# Patient Record
Sex: MALE | Race: WHITE | NOT HISPANIC OR LATINO | Employment: PART TIME | URBAN - METROPOLITAN AREA
[De-identification: names, ages, dates, MRNs, and addresses within clinical notes are randomized per-mention and may not be internally consistent; named-entity substitution may affect disease eponyms.]

---

## 2017-04-13 ENCOUNTER — OFFICE VISIT (OUTPATIENT)
Dept: ORTHOPEDIC SURGERY | Facility: CLINIC | Age: 57
End: 2017-04-13

## 2017-04-13 VITALS — HEIGHT: 73 IN | BODY MASS INDEX: 32.34 KG/M2 | WEIGHT: 244 LBS

## 2017-04-13 DIAGNOSIS — M25.561 CHRONIC PAIN OF RIGHT KNEE: Primary | ICD-10-CM

## 2017-04-13 DIAGNOSIS — G89.29 CHRONIC PAIN OF RIGHT KNEE: Primary | ICD-10-CM

## 2017-04-13 PROCEDURE — 73562 X-RAY EXAM OF KNEE 3: CPT | Performed by: ORTHOPAEDIC SURGERY

## 2017-04-13 PROCEDURE — 20610 DRAIN/INJ JOINT/BURSA W/O US: CPT | Performed by: ORTHOPAEDIC SURGERY

## 2017-04-13 PROCEDURE — 99213 OFFICE O/P EST LOW 20 MIN: CPT | Performed by: ORTHOPAEDIC SURGERY

## 2017-04-13 RX ORDER — MELOXICAM 15 MG/1
TABLET ORAL
Qty: 30 TABLET | Refills: 3 | Status: SHIPPED | OUTPATIENT
Start: 2017-04-13 | End: 2017-07-27

## 2017-04-13 RX ADMIN — METHYLPREDNISOLONE ACETATE 80 MG: 80 INJECTION, SUSPENSION INTRA-ARTICULAR; INTRALESIONAL; INTRAMUSCULAR; SOFT TISSUE at 09:19

## 2017-04-13 RX ADMIN — BUPIVACAINE HYDROCHLORIDE 4 ML: 5 INJECTION, SOLUTION EPIDURAL; INTRACAUDAL at 09:19

## 2017-04-13 NOTE — PROGRESS NOTES
New Right Knee      Patient: Garrett Gomez        YOB: 1960    Medical Record Number: [id number]        Chief Complaints: Right knee pain  Chief Complaint   Patient presents with   • Right Knee - Establish Care           History of Present Illness: This is a  57 y.o. male who presents complaining of right knee pain.  He states he had injuries back in high school MCL injury all treated nonoperatively.  He states he noticed right knee pain ongoing about 6 weeks.  No history injury change in activity that he can recall.  He has swelling has night pain pain is medial and anterior.  His symptoms are described as moderate constant grinding aching clicking popping snapping swelling his pain with standing walking and running.  His past medical history is remarkable for thyroid disease and squamous cell carcinoma        Allergies:   Allergies   Allergen Reactions   • Meperidine Anaphylaxis       Medications:   Home Medications:  Current Outpatient Prescriptions on File Prior to Visit   Medication Sig   • levothyroxine (SYNTHROID, LEVOTHROID) 150 MCG tablet Take  by mouth.     No current facility-administered medications on file prior to visit.      Current Medications:  Scheduled Meds:  Continuous Infusions:  No current facility-administered medications for this visit.   PRN Meds:.    Past Medical History:   Diagnosis Date   • Colon polyp    • Hypertension    • Hypothyroidism         Past Surgical History:   Procedure Laterality Date   • ADENOIDECTOMY     • BACK SURGERY     • COLONOSCOPY     • EP STUDY     • EXTERNAL EAR SURGERY     • HAND SURGERY     • INGUINAL HERNIA REPAIR     • MANDIBLE SURGERY     • NOSE SURGERY     • SHOULDER SURGERY     • THYROIDECTOMY     • TONSILLECTOMY          Social History     Occupational History   • Not on file.     Social History Main Topics   • Smoking status: Former Smoker     Types: Cigars   • Smokeless tobacco: Not on file   • Alcohol use Yes      Comment: occasonial  "wine at meals   • Drug use: No   • Sexual activity: Yes     Partners: Female    Social History     Social History Narrative        Family History   Problem Relation Age of Onset   • Arthritis Mother    • No Known Problems Father    • No Known Problems Brother    • No Known Problems Daughter              Review of Systems: His 14 point review of systems are remarkable for the pertinent positives listed in the patient the remainder are negative    Review of Systems      Physical Exam: 57 y.o. male  General Appearance:    Alert, cooperative, in no acute distress                 Vitals:    04/13/17 0849   Weight: 244 lb (111 kg)   Height: 73\" (185.4 cm)      Patient is alert and read ×3 no acute distress appears her above-listed at height weight and age.  Affect is normal respiratory rate is normal unlabored. Heart rate regular rate rhythm, sclera, dentition and hearing are normal for the purpose of this exam.        Ortho Exam Physical exam of the right  knee reveals no effusion no redness.  The patient does have tenderness about the medial l joint line.  No tenderness about the lateral l joint line.  A negative bounce home and a positive l medial Marquise.    Patient has a stable ligamentous exam.  The patient has a negative Lachman and negative anterior drawer and a negative pivot shift.  Quads are reasonable and symmetric bilaterally.  Calf is soft and nontender.  There is no overlying skin changes no lymphedema lymphadenopathy.  Patient has good hip range of motion full symmetric and asymptomatic and a normal ankle exam.  She has good distal pulses and sensation distally is intact        Large Joint Arthrocentesis  Date/Time: 4/13/2017 9:19 AM  Consent given by: patient  Site marked: site marked  Timeout: Immediately prior to procedure a time out was called to verify the correct patient, procedure, equipment, support staff and site/side marked as required   Supporting Documentation  Indications: pain   Procedure " Details  Location: knee - R knee  Needle size: 25 G  Approach: anteromedial  Medications administered: 4 mL bupivacaine (PF) 0.5 %; 80 mg methylPREDNISolone acetate 80 MG/ML                     Radiology:   AP, Lateral and merchant views of the right knee  were ordered/reviewed to evauateknee pain.  I've no comparative films.  These show some mild patella femoral OA otherwise the patella sat centrally within the trochlear groove cartilage was intact on both compartments      Assessment/Plan:  Right knee pain my differential would be arthritis versus meniscal pathology.  I would like to inject him as a diagnostic and therapeutic tool I will also start him on Mobic with strict precautions and I will see him back in 4 weeks if he fails to improve we will pursue other means of testing

## 2017-04-14 RX ORDER — METHYLPREDNISOLONE ACETATE 80 MG/ML
80 INJECTION, SUSPENSION INTRA-ARTICULAR; INTRALESIONAL; INTRAMUSCULAR; SOFT TISSUE
Status: COMPLETED | OUTPATIENT
Start: 2017-04-13 | End: 2017-04-13

## 2017-04-14 RX ORDER — BUPIVACAINE HYDROCHLORIDE 5 MG/ML
4 INJECTION, SOLUTION EPIDURAL; INTRACAUDAL
Status: COMPLETED | OUTPATIENT
Start: 2017-04-13 | End: 2017-04-13

## 2017-05-18 ENCOUNTER — OFFICE VISIT (OUTPATIENT)
Dept: ORTHOPEDIC SURGERY | Facility: CLINIC | Age: 57
End: 2017-05-18

## 2017-05-18 VITALS — HEIGHT: 73 IN | WEIGHT: 244 LBS | BODY MASS INDEX: 32.34 KG/M2 | TEMPERATURE: 98.4 F

## 2017-05-18 DIAGNOSIS — G89.29 CHRONIC PAIN OF LEFT KNEE: ICD-10-CM

## 2017-05-18 DIAGNOSIS — M25.562 CHRONIC PAIN OF LEFT KNEE: ICD-10-CM

## 2017-05-18 DIAGNOSIS — S83.241D OTHER TEAR OF MEDIAL MENISCUS OF RIGHT KNEE, UNSPECIFIED WHETHER OLD OR CURRENT TEAR, SUBSEQUENT ENCOUNTER: Primary | ICD-10-CM

## 2017-05-18 PROCEDURE — 99212 OFFICE O/P EST SF 10 MIN: CPT | Performed by: ORTHOPAEDIC SURGERY

## 2017-05-18 PROCEDURE — 20610 DRAIN/INJ JOINT/BURSA W/O US: CPT | Performed by: ORTHOPAEDIC SURGERY

## 2017-05-18 RX ADMIN — BUPIVACAINE HYDROCHLORIDE 4 ML: 2.5 INJECTION, SOLUTION INFILTRATION; PERINEURAL at 08:16

## 2017-05-18 RX ADMIN — METHYLPREDNISOLONE ACETATE 80 MG: 80 INJECTION, SUSPENSION INTRA-ARTICULAR; INTRALESIONAL; INTRAMUSCULAR; SOFT TISSUE at 08:16

## 2017-05-23 RX ORDER — BUPIVACAINE HYDROCHLORIDE 2.5 MG/ML
4 INJECTION, SOLUTION INFILTRATION; PERINEURAL
Status: COMPLETED | OUTPATIENT
Start: 2017-05-18 | End: 2017-05-18

## 2017-05-23 RX ORDER — METHYLPREDNISOLONE ACETATE 80 MG/ML
80 INJECTION, SUSPENSION INTRA-ARTICULAR; INTRALESIONAL; INTRAMUSCULAR; SOFT TISSUE
Status: COMPLETED | OUTPATIENT
Start: 2017-05-18 | End: 2017-05-18

## 2017-06-13 ENCOUNTER — TELEPHONE (OUTPATIENT)
Dept: ORTHOPEDIC SURGERY | Facility: CLINIC | Age: 57
End: 2017-06-13

## 2017-06-15 NOTE — TELEPHONE ENCOUNTER
Called patient let him know his MRI results. Use Ice and rest if any more problems call our office-lck

## 2017-06-25 ENCOUNTER — APPOINTMENT (OUTPATIENT)
Dept: GENERAL RADIOLOGY | Facility: HOSPITAL | Age: 57
End: 2017-06-25

## 2017-06-25 ENCOUNTER — HOSPITAL ENCOUNTER (INPATIENT)
Facility: HOSPITAL | Age: 57
LOS: 2 days | Discharge: HOME | End: 2017-06-27
Attending: EMERGENCY MEDICINE | Admitting: INTERNAL MEDICINE

## 2017-06-25 ENCOUNTER — APPOINTMENT (OUTPATIENT)
Dept: CT IMAGING | Facility: HOSPITAL | Age: 57
End: 2017-06-25

## 2017-06-25 DIAGNOSIS — I95.1 ORTHOSTATIC HYPOTENSION: ICD-10-CM

## 2017-06-25 DIAGNOSIS — I95.1 ORTHOSTATIC SYNCOPE: ICD-10-CM

## 2017-06-25 DIAGNOSIS — K92.2 ACUTE GI BLEEDING: Primary | ICD-10-CM

## 2017-06-25 LAB
ABO GROUP BLD: NORMAL
ALBUMIN SERPL-MCNC: 3.2 G/DL (ref 3.5–5.2)
ALBUMIN/GLOB SERPL: 1.5 G/DL
ALP SERPL-CCNC: 54 U/L (ref 39–117)
ALT SERPL W P-5'-P-CCNC: 21 U/L (ref 1–41)
ANION GAP SERPL CALCULATED.3IONS-SCNC: 11.1 MMOL/L
APTT PPP: 26.3 SECONDS (ref 22.7–35.4)
AST SERPL-CCNC: 16 U/L (ref 1–40)
BASOPHILS # BLD AUTO: 0.03 10*3/MM3 (ref 0–0.2)
BASOPHILS NFR BLD AUTO: 0.4 % (ref 0–1.5)
BILIRUB SERPL-MCNC: 0.3 MG/DL (ref 0.1–1.2)
BILIRUB UR QL STRIP: NEGATIVE
BLD GP AB SCN SERPL QL: NEGATIVE
BUN BLD-MCNC: 14 MG/DL (ref 6–20)
BUN/CREAT SERPL: 12.6 (ref 7–25)
CALCIUM SPEC-SCNC: 7.8 MG/DL (ref 8.6–10.5)
CHLORIDE SERPL-SCNC: 106 MMOL/L (ref 98–107)
CLARITY UR: CLEAR
CO2 SERPL-SCNC: 23.9 MMOL/L (ref 22–29)
COLOR UR: YELLOW
CREAT BLD-MCNC: 1.11 MG/DL (ref 0.76–1.27)
D-LACTATE SERPL-SCNC: 1.5 MMOL/L (ref 0.5–2)
D-LACTATE SERPL-SCNC: 2.2 MMOL/L (ref 0.5–2)
DEPRECATED RDW RBC AUTO: 44.7 FL (ref 37–54)
EOSINOPHIL # BLD AUTO: 0.08 10*3/MM3 (ref 0–0.7)
EOSINOPHIL NFR BLD AUTO: 1.1 % (ref 0.3–6.2)
ERYTHROCYTE [DISTWIDTH] IN BLOOD BY AUTOMATED COUNT: 13.6 % (ref 11.5–14.5)
GFR SERPL CREATININE-BSD FRML MDRD: 69 ML/MIN/1.73
GLOBULIN UR ELPH-MCNC: 2.2 GM/DL
GLUCOSE BLD-MCNC: 158 MG/DL (ref 65–99)
GLUCOSE BLDC GLUCOMTR-MCNC: 128 MG/DL (ref 70–130)
GLUCOSE BLDC GLUCOMTR-MCNC: 155 MG/DL (ref 70–130)
GLUCOSE BLDC GLUCOMTR-MCNC: 99 MG/DL (ref 70–130)
GLUCOSE UR STRIP-MCNC: NEGATIVE MG/DL
HCT VFR BLD AUTO: 29.1 % (ref 40.4–52.2)
HCT VFR BLD AUTO: 31.6 % (ref 40.4–52.2)
HCT VFR BLD AUTO: 38.3 % (ref 40.4–52.2)
HGB BLD-MCNC: 10.9 G/DL (ref 13.7–17.6)
HGB BLD-MCNC: 13.2 G/DL (ref 13.7–17.6)
HGB BLD-MCNC: 9.7 G/DL (ref 13.7–17.6)
HGB UR QL STRIP.AUTO: NEGATIVE
HOLD SPECIMEN: NORMAL
HOLD SPECIMEN: NORMAL
IMM GRANULOCYTES # BLD: 0.11 10*3/MM3 (ref 0–0.03)
IMM GRANULOCYTES NFR BLD: 1.5 % (ref 0–0.5)
INR PPP: 1.14 (ref 0.9–1.1)
KETONES UR QL STRIP: NEGATIVE
LEUKOCYTE ESTERASE UR QL STRIP.AUTO: NEGATIVE
LIPASE SERPL-CCNC: 47 U/L (ref 13–60)
LYMPHOCYTES # BLD AUTO: 2.37 10*3/MM3 (ref 0.9–4.8)
LYMPHOCYTES NFR BLD AUTO: 31.8 % (ref 19.6–45.3)
MCH RBC QN AUTO: 30.9 PG (ref 27–32.7)
MCHC RBC AUTO-ENTMCNC: 34.5 G/DL (ref 32.6–36.4)
MCV RBC AUTO: 89.7 FL (ref 79.8–96.2)
MONOCYTES # BLD AUTO: 0.7 10*3/MM3 (ref 0.2–1.2)
MONOCYTES NFR BLD AUTO: 9.4 % (ref 5–12)
NEUTROPHILS # BLD AUTO: 4.17 10*3/MM3 (ref 1.9–8.1)
NEUTROPHILS NFR BLD AUTO: 55.8 % (ref 42.7–76)
NITRITE UR QL STRIP: NEGATIVE
PH UR STRIP.AUTO: 6 [PH] (ref 5–8)
PLATELET # BLD AUTO: 230 10*3/MM3 (ref 140–500)
PMV BLD AUTO: 9.1 FL (ref 6–12)
POTASSIUM BLD-SCNC: 3.9 MMOL/L (ref 3.5–5.2)
PROT SERPL-MCNC: 5.4 G/DL (ref 6–8.5)
PROT UR QL STRIP: NEGATIVE
PROTHROMBIN TIME: 14.2 SECONDS (ref 11.7–14.2)
RBC # BLD AUTO: 4.27 10*6/MM3 (ref 4.6–6)
RH BLD: POSITIVE
SODIUM BLD-SCNC: 141 MMOL/L (ref 136–145)
SP GR UR STRIP: 1.02 (ref 1–1.03)
UROBILINOGEN UR QL STRIP: NORMAL
WBC NRBC COR # BLD: 7.46 10*3/MM3 (ref 4.5–10.7)
WHOLE BLOOD HOLD SPECIMEN: NORMAL
WHOLE BLOOD HOLD SPECIMEN: NORMAL

## 2017-06-25 PROCEDURE — 83605 ASSAY OF LACTIC ACID: CPT

## 2017-06-25 PROCEDURE — 83605 ASSAY OF LACTIC ACID: CPT | Performed by: EMERGENCY MEDICINE

## 2017-06-25 PROCEDURE — 25010000002 FENTANYL CITRATE (PF) 100 MCG/2ML SOLUTION: Performed by: INTERNAL MEDICINE

## 2017-06-25 PROCEDURE — 81003 URINALYSIS AUTO W/O SCOPE: CPT | Performed by: INTERNAL MEDICINE

## 2017-06-25 PROCEDURE — 85730 THROMBOPLASTIN TIME PARTIAL: CPT | Performed by: EMERGENCY MEDICINE

## 2017-06-25 PROCEDURE — 85610 PROTHROMBIN TIME: CPT | Performed by: EMERGENCY MEDICINE

## 2017-06-25 PROCEDURE — 99254 IP/OBS CNSLTJ NEW/EST MOD 60: CPT | Performed by: INTERNAL MEDICINE

## 2017-06-25 PROCEDURE — 86850 RBC ANTIBODY SCREEN: CPT

## 2017-06-25 PROCEDURE — 99291 CRITICAL CARE FIRST HOUR: CPT

## 2017-06-25 PROCEDURE — 86901 BLOOD TYPING SEROLOGIC RH(D): CPT

## 2017-06-25 PROCEDURE — 85025 COMPLETE CBC W/AUTO DIFF WBC: CPT

## 2017-06-25 PROCEDURE — 0 IOPAMIDOL 61 % SOLUTION: Performed by: EMERGENCY MEDICINE

## 2017-06-25 PROCEDURE — 83690 ASSAY OF LIPASE: CPT | Performed by: EMERGENCY MEDICINE

## 2017-06-25 PROCEDURE — 86900 BLOOD TYPING SEROLOGIC ABO: CPT

## 2017-06-25 PROCEDURE — 74177 CT ABD & PELVIS W/CONTRAST: CPT

## 2017-06-25 PROCEDURE — 80053 COMPREHEN METABOLIC PANEL: CPT

## 2017-06-25 PROCEDURE — 71010 HC CHEST PA OR AP: CPT

## 2017-06-25 PROCEDURE — 85018 HEMOGLOBIN: CPT | Performed by: INTERNAL MEDICINE

## 2017-06-25 PROCEDURE — 85014 HEMATOCRIT: CPT | Performed by: INTERNAL MEDICINE

## 2017-06-25 PROCEDURE — 25010000002 ONDANSETRON PER 1 MG: Performed by: EMERGENCY MEDICINE

## 2017-06-25 PROCEDURE — 25010000002 MORPHINE PER 10 MG: Performed by: EMERGENCY MEDICINE

## 2017-06-25 PROCEDURE — 82962 GLUCOSE BLOOD TEST: CPT

## 2017-06-25 RX ORDER — MORPHINE SULFATE 2 MG/ML
2 INJECTION, SOLUTION INTRAMUSCULAR; INTRAVENOUS ONCE
Status: COMPLETED | OUTPATIENT
Start: 2017-06-25 | End: 2017-06-25

## 2017-06-25 RX ORDER — LEVOTHYROXINE SODIUM 0.15 MG/1
150 TABLET ORAL
Status: DISCONTINUED | OUTPATIENT
Start: 2017-06-26 | End: 2017-06-25

## 2017-06-25 RX ORDER — TESTOSTERONE CYPIONATE 200 MG/ML
0.5 INJECTION, SOLUTION INTRAMUSCULAR 2 TIMES WEEKLY
Status: DISCONTINUED | OUTPATIENT
Start: 2017-06-26 | End: 2017-06-27 | Stop reason: HOSPADM

## 2017-06-25 RX ORDER — SODIUM CHLORIDE 0.9 % (FLUSH) 0.9 %
10 SYRINGE (ML) INJECTION AS NEEDED
Status: DISCONTINUED | OUTPATIENT
Start: 2017-06-25 | End: 2017-06-27 | Stop reason: HOSPADM

## 2017-06-25 RX ORDER — MELATONIN
10000 DAILY
Status: DISCONTINUED | OUTPATIENT
Start: 2017-06-25 | End: 2017-06-25

## 2017-06-25 RX ORDER — ONDANSETRON 2 MG/ML
4 INJECTION INTRAMUSCULAR; INTRAVENOUS ONCE
Status: COMPLETED | OUTPATIENT
Start: 2017-06-25 | End: 2017-06-25

## 2017-06-25 RX ORDER — DIPHENOXYLATE HYDROCHLORIDE AND ATROPINE SULFATE 2.5; .025 MG/1; MG/1
1 TABLET ORAL DAILY
Status: DISCONTINUED | OUTPATIENT
Start: 2017-06-25 | End: 2017-06-25

## 2017-06-25 RX ORDER — MELATONIN
10000 DAILY
Status: DISCONTINUED | OUTPATIENT
Start: 2017-06-26 | End: 2017-06-27 | Stop reason: HOSPADM

## 2017-06-25 RX ORDER — FENTANYL CITRATE 50 UG/ML
25 INJECTION, SOLUTION INTRAMUSCULAR; INTRAVENOUS
Status: DISCONTINUED | OUTPATIENT
Start: 2017-06-25 | End: 2017-06-25

## 2017-06-25 RX ORDER — PANTOPRAZOLE SODIUM 40 MG/10ML
80 INJECTION, POWDER, LYOPHILIZED, FOR SOLUTION INTRAVENOUS ONCE
Status: COMPLETED | OUTPATIENT
Start: 2017-06-25 | End: 2017-06-25

## 2017-06-25 RX ORDER — DIPHENOXYLATE HYDROCHLORIDE AND ATROPINE SULFATE 2.5; .025 MG/1; MG/1
1 TABLET ORAL DAILY
Status: DISCONTINUED | OUTPATIENT
Start: 2017-06-26 | End: 2017-06-27 | Stop reason: HOSPADM

## 2017-06-25 RX ORDER — FENTANYL CITRATE 50 UG/ML
50 INJECTION, SOLUTION INTRAMUSCULAR; INTRAVENOUS
Status: DISCONTINUED | OUTPATIENT
Start: 2017-06-25 | End: 2017-06-27 | Stop reason: HOSPADM

## 2017-06-25 RX ORDER — PANTOPRAZOLE SODIUM 40 MG/1
40 TABLET, DELAYED RELEASE ORAL EVERY MORNING
Status: DISCONTINUED | OUTPATIENT
Start: 2017-06-26 | End: 2017-06-27 | Stop reason: HOSPADM

## 2017-06-25 RX ORDER — SODIUM CHLORIDE 9 MG/ML
175 INJECTION, SOLUTION INTRAVENOUS CONTINUOUS
Status: DISCONTINUED | OUTPATIENT
Start: 2017-06-25 | End: 2017-06-27 | Stop reason: HOSPADM

## 2017-06-25 RX ADMIN — PANTOPRAZOLE SODIUM 8 MG/HR: 40 INJECTION, POWDER, FOR SOLUTION INTRAVENOUS at 09:28

## 2017-06-25 RX ADMIN — FENTANYL CITRATE 25 MCG: 50 INJECTION INTRAMUSCULAR; INTRAVENOUS at 14:06

## 2017-06-25 RX ADMIN — PANTOPRAZOLE SODIUM 80 MG: 40 INJECTION, POWDER, FOR SOLUTION INTRAVENOUS at 09:28

## 2017-06-25 RX ADMIN — METRONIDAZOLE 500 MG: 500 INJECTION, SOLUTION INTRAVENOUS at 14:09

## 2017-06-25 RX ADMIN — SODIUM CHLORIDE 1000 ML: 9 INJECTION, SOLUTION INTRAVENOUS at 11:10

## 2017-06-25 RX ADMIN — MORPHINE SULFATE 2 MG: 2 INJECTION, SOLUTION INTRAMUSCULAR; INTRAVENOUS at 09:20

## 2017-06-25 RX ADMIN — SODIUM CHLORIDE 175 ML/HR: 9 INJECTION, SOLUTION INTRAVENOUS at 16:00

## 2017-06-25 RX ADMIN — SODIUM CHLORIDE 175 ML/HR: 9 INJECTION, SOLUTION INTRAVENOUS at 23:13

## 2017-06-25 RX ADMIN — IOPAMIDOL 85 ML: 612 INJECTION, SOLUTION INTRAVENOUS at 09:46

## 2017-06-25 RX ADMIN — SODIUM CHLORIDE 250 ML/HR: 9 INJECTION, SOLUTION INTRAVENOUS at 10:54

## 2017-06-25 RX ADMIN — FENTANYL CITRATE 25 MCG: 50 INJECTION INTRAMUSCULAR; INTRAVENOUS at 12:14

## 2017-06-25 RX ADMIN — FENTANYL CITRATE 50 MCG: 50 INJECTION INTRAMUSCULAR; INTRAVENOUS at 14:31

## 2017-06-25 RX ADMIN — ONDANSETRON 4 MG: 2 INJECTION INTRAMUSCULAR; INTRAVENOUS at 09:23

## 2017-06-25 RX ADMIN — SODIUM CHLORIDE 1000 ML: 9 INJECTION, SOLUTION INTRAVENOUS at 10:53

## 2017-06-26 ENCOUNTER — ANESTHESIA EVENT (OUTPATIENT)
Dept: GASTROENTEROLOGY | Facility: HOSPITAL | Age: 57
End: 2017-06-26

## 2017-06-26 ENCOUNTER — ANESTHESIA (OUTPATIENT)
Dept: GASTROENTEROLOGY | Facility: HOSPITAL | Age: 57
End: 2017-06-26

## 2017-06-26 LAB
ANION GAP SERPL CALCULATED.3IONS-SCNC: 10.7 MMOL/L
BUN BLD-MCNC: 7 MG/DL (ref 6–20)
BUN/CREAT SERPL: 9.2 (ref 7–25)
CALCIUM SPEC-SCNC: 7.5 MG/DL (ref 8.6–10.5)
CHLORIDE SERPL-SCNC: 111 MMOL/L (ref 98–107)
CO2 SERPL-SCNC: 20.3 MMOL/L (ref 22–29)
CREAT BLD-MCNC: 0.76 MG/DL (ref 0.76–1.27)
GFR SERPL CREATININE-BSD FRML MDRD: 106 ML/MIN/1.73
GLUCOSE BLD-MCNC: 84 MG/DL (ref 65–99)
GLUCOSE BLDC GLUCOMTR-MCNC: 92 MG/DL (ref 70–130)
HCT VFR BLD AUTO: 31.3 % (ref 40.4–52.2)
HCT VFR BLD AUTO: 32.1 % (ref 40.4–52.2)
HCT VFR BLD AUTO: 34.5 % (ref 40.4–52.2)
HGB BLD-MCNC: 10.3 G/DL (ref 13.7–17.6)
HGB BLD-MCNC: 10.6 G/DL (ref 13.7–17.6)
HGB BLD-MCNC: 11.6 G/DL (ref 13.7–17.6)
MAGNESIUM SERPL-MCNC: 2 MG/DL (ref 1.6–2.6)
POTASSIUM BLD-SCNC: 3.7 MMOL/L (ref 3.5–5.2)
SODIUM BLD-SCNC: 142 MMOL/L (ref 136–145)

## 2017-06-26 PROCEDURE — 85014 HEMATOCRIT: CPT | Performed by: INTERNAL MEDICINE

## 2017-06-26 PROCEDURE — 83735 ASSAY OF MAGNESIUM: CPT | Performed by: INTERNAL MEDICINE

## 2017-06-26 PROCEDURE — 45378 DIAGNOSTIC COLONOSCOPY: CPT | Performed by: INTERNAL MEDICINE

## 2017-06-26 PROCEDURE — 25010000002 TESTOSTERONE CYPIONATE 200 MG/ML SOLUTION: Performed by: INTERNAL MEDICINE

## 2017-06-26 PROCEDURE — 85018 HEMOGLOBIN: CPT | Performed by: INTERNAL MEDICINE

## 2017-06-26 PROCEDURE — 82962 GLUCOSE BLOOD TEST: CPT

## 2017-06-26 PROCEDURE — 0DJD8ZZ INSPECTION OF LOWER INTESTINAL TRACT, VIA NATURAL OR ARTIFICIAL OPENING ENDOSCOPIC: ICD-10-PCS | Performed by: INTERNAL MEDICINE

## 2017-06-26 PROCEDURE — 25010000002 PROPOFOL 10 MG/ML EMULSION: Performed by: ANESTHESIOLOGY

## 2017-06-26 PROCEDURE — 80048 BASIC METABOLIC PNL TOTAL CA: CPT | Performed by: INTERNAL MEDICINE

## 2017-06-26 RX ORDER — PROPOFOL 10 MG/ML
VIAL (ML) INTRAVENOUS AS NEEDED
Status: DISCONTINUED | OUTPATIENT
Start: 2017-06-26 | End: 2017-06-26 | Stop reason: SURG

## 2017-06-26 RX ORDER — LIDOCAINE HYDROCHLORIDE 20 MG/ML
INJECTION, SOLUTION INFILTRATION; PERINEURAL AS NEEDED
Status: DISCONTINUED | OUTPATIENT
Start: 2017-06-26 | End: 2017-06-26 | Stop reason: SURG

## 2017-06-26 RX ORDER — PROPOFOL 10 MG/ML
VIAL (ML) INTRAVENOUS CONTINUOUS PRN
Status: DISCONTINUED | OUTPATIENT
Start: 2017-06-26 | End: 2017-06-26 | Stop reason: SURG

## 2017-06-26 RX ORDER — POLYETHYLENE GLYCOL 3350, SODIUM CHLORIDE, POTASSIUM CHLORIDE, SODIUM BICARBONATE, AND SODIUM SULFATE 240; 5.84; 2.98; 6.72; 22.72 G/4L; G/4L; G/4L; G/4L; G/4L
2000 POWDER, FOR SOLUTION ORAL ONCE
Status: COMPLETED | OUTPATIENT
Start: 2017-06-26 | End: 2017-06-26

## 2017-06-26 RX ORDER — BISACODYL 5 MG/1
10 TABLET, DELAYED RELEASE ORAL ONCE
Status: COMPLETED | OUTPATIENT
Start: 2017-06-26 | End: 2017-06-26

## 2017-06-26 RX ADMIN — SODIUM CHLORIDE 175 ML/HR: 9 INJECTION, SOLUTION INTRAVENOUS at 16:55

## 2017-06-26 RX ADMIN — SODIUM CHLORIDE 175 ML/HR: 9 INJECTION, SOLUTION INTRAVENOUS at 23:47

## 2017-06-26 RX ADMIN — PROPOFOL 160 MCG/KG/MIN: 10 INJECTION, EMULSION INTRAVENOUS at 16:07

## 2017-06-26 RX ADMIN — METRONIDAZOLE 500 MG: 500 INJECTION, SOLUTION INTRAVENOUS at 18:27

## 2017-06-26 RX ADMIN — Medication 1 TABLET: at 09:15

## 2017-06-26 RX ADMIN — POLYETHYLENE GLYCOL 3350, SODIUM CHLORIDE, POTASSIUM CHLORIDE, SODIUM BICARBONATE, AND SODIUM SULFATE 2000 ML: 240; 5.84; 2.98; 6.72; 22.72 POWDER, FOR SOLUTION ORAL at 06:05

## 2017-06-26 RX ADMIN — BISACODYL 10 MG: 5 TABLET, COATED ORAL at 05:25

## 2017-06-26 RX ADMIN — PANTOPRAZOLE SODIUM 40 MG: 40 TABLET, DELAYED RELEASE ORAL at 09:14

## 2017-06-26 RX ADMIN — SODIUM CHLORIDE 175 ML/HR: 9 INJECTION, SOLUTION INTRAVENOUS at 11:22

## 2017-06-26 RX ADMIN — PROPOFOL 120 MG: 10 INJECTION, EMULSION INTRAVENOUS at 16:07

## 2017-06-26 RX ADMIN — TESTOSTERONE CYPIONATE 100 MG: 200 INJECTION, SOLUTION INTRAMUSCULAR at 09:18

## 2017-06-26 RX ADMIN — LIDOCAINE HYDROCHLORIDE 100 MG: 20 INJECTION, SOLUTION INFILTRATION; PERINEURAL at 16:07

## 2017-06-26 NOTE — ANESTHESIA POSTPROCEDURE EVALUATION
Patient: Pablo Gomez Jr.    Procedure Summary     Date Anesthesia Start Anesthesia Stop Room / Location    06/26/17 [id number]  [facility]       Procedure Diagnosis Surgeon Provider    COLONOSCOPY (N/A ) Acute GI bleeding  (Acute GI bleeding [K92.2]) MD Heladio Burr MD          Anesthesia Type: MAC  Last vitals  BP      Temp      Pulse     Resp      SpO2        Post Anesthesia Care and Evaluation    Patient location during evaluation: ICU  Patient participation: complete - patient participated  Level of consciousness: awake and alert  Pain score: 0  Pain management: adequate  Airway patency: patent  Anesthetic complications: No anesthetic complications    Cardiovascular status: acceptable  Respiratory status: acceptable  Hydration status: acceptable

## 2017-06-26 NOTE — ANESTHESIA PREPROCEDURE EVALUATION
Anesthesia Evaluation     Patient summary reviewed and Nursing notes reviewed   no history of anesthetic complications:  NPO Solid Status: > 8 hours  NPO Liquid Status: > 8 hours     Airway   Mallampati: II  TM distance: >3 FB  Neck ROM: full  no difficulty expected  Dental - normal exam     Pulmonary - normal exam   (+) a smoker Former,   Cardiovascular - normal exam  Exercise tolerance: good (4-7 METS)    ECG reviewed  Rhythm: regular  Rate: normal    (+) hypertension well controlled, dysrhythmias Atrial Fib,     ROS comment: A.fib s/p ablation.    Neuro/Psych- negative ROS  GI/Hepatic/Renal/Endo    (+)  hypothyroidism,     ROS Comment: Acute GI bleed w/ anemia    Musculoskeletal (-) negative ROS    Abdominal  - normal exam   Substance History - negative use     OB/GYN negative ob/gyn ROS         Other - negative ROS                                       Anesthesia Plan    ASA 3     MAC     intravenous induction   Anesthetic plan and risks discussed with patient.    Plan discussed with attending.

## 2017-06-27 VITALS
TEMPERATURE: 98.5 F | WEIGHT: 224 LBS | DIASTOLIC BLOOD PRESSURE: 95 MMHG | SYSTOLIC BLOOD PRESSURE: 156 MMHG | RESPIRATION RATE: 20 BRPM | HEIGHT: 76 IN | HEART RATE: 77 BPM | OXYGEN SATURATION: 99 % | BODY MASS INDEX: 27.28 KG/M2

## 2017-06-27 PROBLEM — K92.2 ACUTE GI BLEEDING: Status: RESOLVED | Noted: 2017-06-25 | Resolved: 2017-06-27

## 2017-06-27 LAB
GLUCOSE BLDC GLUCOMTR-MCNC: 92 MG/DL (ref 70–130)
HCT VFR BLD AUTO: 29.3 % (ref 40.4–52.2)
HGB BLD-MCNC: 9.8 G/DL (ref 13.7–17.6)

## 2017-06-27 PROCEDURE — 85014 HEMATOCRIT: CPT | Performed by: INTERNAL MEDICINE

## 2017-06-27 PROCEDURE — 82962 GLUCOSE BLOOD TEST: CPT

## 2017-06-27 PROCEDURE — 85018 HEMOGLOBIN: CPT | Performed by: INTERNAL MEDICINE

## 2017-06-27 RX ORDER — METRONIDAZOLE 500 MG/1
500 TABLET ORAL 3 TIMES DAILY
Qty: 30 TABLET | Refills: 0 | Status: SHIPPED | OUTPATIENT
Start: 2017-06-27 | End: 2017-07-27

## 2017-06-27 RX ADMIN — Medication 1 TABLET: at 08:01

## 2017-06-27 RX ADMIN — PANTOPRAZOLE SODIUM 40 MG: 40 TABLET, DELAYED RELEASE ORAL at 06:50

## 2017-06-27 RX ADMIN — SODIUM CHLORIDE 175 ML/HR: 9 INJECTION, SOLUTION INTRAVENOUS at 06:50

## 2017-06-27 RX ADMIN — METRONIDAZOLE 500 MG: 500 INJECTION, SOLUTION INTRAVENOUS at 01:23

## 2017-07-27 ENCOUNTER — OFFICE VISIT (OUTPATIENT)
Dept: FAMILY MEDICINE CLINIC | Facility: CLINIC | Age: 57
End: 2017-07-27

## 2017-07-27 VITALS
RESPIRATION RATE: 18 BRPM | TEMPERATURE: 98.1 F | OXYGEN SATURATION: 99 % | SYSTOLIC BLOOD PRESSURE: 120 MMHG | WEIGHT: 228.8 LBS | BODY MASS INDEX: 27.86 KG/M2 | DIASTOLIC BLOOD PRESSURE: 72 MMHG | HEIGHT: 76 IN | HEART RATE: 73 BPM

## 2017-07-27 DIAGNOSIS — D50.8 OTHER IRON DEFICIENCY ANEMIA: ICD-10-CM

## 2017-07-27 DIAGNOSIS — E03.9 ADULT HYPOTHYROIDISM: ICD-10-CM

## 2017-07-27 DIAGNOSIS — I10 ESSENTIAL HYPERTENSION: Primary | ICD-10-CM

## 2017-07-27 LAB
ALBUMIN SERPL-MCNC: 4.2 G/DL (ref 3.5–5.2)
ALBUMIN/GLOB SERPL: 1.9 G/DL
ALP SERPL-CCNC: 73 U/L (ref 39–117)
ALT SERPL-CCNC: 21 U/L (ref 1–41)
AST SERPL-CCNC: 16 U/L (ref 1–40)
BASOPHILS # BLD AUTO: 0.02 10*3/MM3 (ref 0–0.2)
BASOPHILS NFR BLD AUTO: 0.4 % (ref 0–1.5)
BILIRUB SERPL-MCNC: 0.4 MG/DL (ref 0.1–1.2)
BUN SERPL-MCNC: 13 MG/DL (ref 6–20)
BUN/CREAT SERPL: 14.3 (ref 7–25)
CALCIUM SERPL-MCNC: 9.2 MG/DL (ref 8.6–10.5)
CHLORIDE SERPL-SCNC: 104 MMOL/L (ref 98–107)
CO2 SERPL-SCNC: 24.8 MMOL/L (ref 22–29)
CREAT SERPL-MCNC: 0.91 MG/DL (ref 0.76–1.27)
EOSINOPHIL # BLD AUTO: 0.13 10*3/MM3 (ref 0–0.7)
EOSINOPHIL NFR BLD AUTO: 2.4 % (ref 0.3–6.2)
ERYTHROCYTE [DISTWIDTH] IN BLOOD BY AUTOMATED COUNT: 13.7 % (ref 11.5–14.5)
GLOBULIN SER CALC-MCNC: 2.2 GM/DL
GLUCOSE SERPL-MCNC: 82 MG/DL (ref 65–99)
HCT VFR BLD AUTO: 34.6 % (ref 40.4–52.2)
HGB BLD-MCNC: 10.3 G/DL (ref 13.7–17.6)
IMM GRANULOCYTES # BLD: 0 10*3/MM3 (ref 0–0.03)
IMM GRANULOCYTES NFR BLD: 0 % (ref 0–0.5)
LYMPHOCYTES # BLD AUTO: 1.91 10*3/MM3 (ref 0.9–4.8)
LYMPHOCYTES NFR BLD AUTO: 35.2 % (ref 19.6–45.3)
MCH RBC QN AUTO: 27.2 PG (ref 27–32.7)
MCHC RBC AUTO-ENTMCNC: 29.8 G/DL (ref 32.6–36.4)
MCV RBC AUTO: 91.3 FL (ref 79.8–96.2)
MONOCYTES # BLD AUTO: 0.55 10*3/MM3 (ref 0.2–1.2)
MONOCYTES NFR BLD AUTO: 10.1 % (ref 5–12)
NEUTROPHILS # BLD AUTO: 2.82 10*3/MM3 (ref 1.9–8.1)
NEUTROPHILS NFR BLD AUTO: 51.9 % (ref 42.7–76)
PLATELET # BLD AUTO: 323 10*3/MM3 (ref 140–500)
POTASSIUM SERPL-SCNC: 4.3 MMOL/L (ref 3.5–5.2)
PROT SERPL-MCNC: 6.4 G/DL (ref 6–8.5)
RBC # BLD AUTO: 3.79 10*6/MM3 (ref 4.6–6)
SODIUM SERPL-SCNC: 141 MMOL/L (ref 136–145)
TSH SERPL DL<=0.005 MIU/L-ACNC: 3.91 MIU/ML (ref 0.27–4.2)
WBC # BLD AUTO: 5.43 10*3/MM3 (ref 4.5–10.7)

## 2017-07-27 PROCEDURE — 99213 OFFICE O/P EST LOW 20 MIN: CPT | Performed by: FAMILY MEDICINE

## 2017-07-27 NOTE — PROGRESS NOTES
Subjective   Pablo Gomez Jr. is a 57 y.o. male. CC: anemia    History of Present Illness   Pablo is a 57 year old male who comes in today for check on anemia.  He was hospitalized on 6/25 after developing an acute GI bleed.  He states that it was determined that this was secondary to diverticular disease.  He was given 15 bags of IVFs.  Did not require blood transfusion.    In review of the records, Hgb at the time of discharge was 9.8.  He states that he is feeling much better now with no rectal bleeding.  Bowel movements are normal.  He is due to have TSH drawn.  Has had thyroidectomy.  His blood pressure has been under good control.  He has applied to be a  in [facility] and needs forms signed concerning his past medical history.  Also needs form filled out with his vitals and a physical.          The following portions of the patient's history were reviewed and updated as appropriate: allergies, current medications, past medical history, past social history, past surgical history and problem list.    Review of Systems   Constitutional: Negative.  Negative for fatigue and unexpected weight change.   HENT: Negative.  Negative for congestion and sinus pressure.    Respiratory: Negative.  Negative for cough, shortness of breath and wheezing.    Cardiovascular: Negative.  Negative for chest pain, palpitations and leg swelling.   Gastrointestinal: Negative.  Negative for abdominal pain, blood in stool, constipation, diarrhea, nausea and vomiting.   Genitourinary: Negative.  Negative for difficulty urinating.   Musculoskeletal: Negative.  Negative for arthralgias and back pain.   Skin: Negative.  Negative for rash.   Neurological: Negative.  Negative for dizziness, light-headedness and headaches.   Psychiatric/Behavioral: Negative.  Negative for dysphoric mood and sleep disturbance. The patient is not nervous/anxious.        Objective   Physical Exam   Constitutional: He is oriented to person,  place, and time. He appears well-developed and well-nourished.   Neck: Normal range of motion. Neck supple. No JVD present. Carotid bruit is not present. No thyromegaly present.   Cardiovascular: Normal rate, regular rhythm and normal heart sounds.    Pulmonary/Chest: Effort normal and breath sounds normal. No respiratory distress.   Lymphadenopathy:     He has no cervical adenopathy.   Neurological: He is alert and oriented to person, place, and time.   Skin: Skin is warm and dry. No rash noted.   Psychiatric: He has a normal mood and affect. His behavior is normal.   Nursing note and vitals reviewed.    Vitals:    07/27/17 0804   BP: 120/72   Pulse: 73   Resp: 18   Temp: 98.1 °F (36.7 °C)   SpO2: 99%     Assessment/Plan   Problems Addressed this Visit        Cardiovascular and Mediastinum    Hypertension - Primary    Relevant Orders    TSH       Endocrine    Adult hypothyroidism    Relevant Orders    Comprehensive Metabolic Panel      Other Visit Diagnoses     Other iron deficiency anemia        Relevant Orders    CBC & Differential      Blood pressure is well controlled so nothing needs to be changed.  Checking CBC to be sure anemia is stable or has resolved.  Diagnosis with the orders above are in error.  The TSH is done for hypothyroidism and the CMP for the hypertension.  Forms filled out for the 's office.  Copy of these scanned into the chart.

## 2017-07-28 ENCOUNTER — TELEPHONE (OUTPATIENT)
Dept: FAMILY MEDICINE CLINIC | Facility: CLINIC | Age: 57
End: 2017-07-28

## 2017-07-28 NOTE — TELEPHONE ENCOUNTER
----- Message from Robyn Green MA sent at 7/28/2017 11:53 AM EDT -----  Pt notified and wondering does he need to take meds or come back and check it? He said he knows with loosing a lot of blood this is the reason or just give it time?    Should build back on its own.  Would recommend repeat CBC in about 4-6 weeks to be sure.  Lab only is fine and order will be in EMR

## 2017-09-05 DIAGNOSIS — D50.8 OTHER IRON DEFICIENCY ANEMIA: Primary | ICD-10-CM

## 2017-09-05 LAB
BASOPHILS # BLD AUTO: 0.03 10*3/MM3 (ref 0–0.2)
BASOPHILS NFR BLD AUTO: 0.4 % (ref 0–1.5)
EOSINOPHIL # BLD AUTO: 0.07 10*3/MM3 (ref 0–0.7)
EOSINOPHIL NFR BLD AUTO: 0.9 % (ref 0.3–6.2)
ERYTHROCYTE [DISTWIDTH] IN BLOOD BY AUTOMATED COUNT: 15.4 % (ref 11.5–14.5)
HCT VFR BLD AUTO: 36.8 % (ref 40.4–52.2)
HGB BLD-MCNC: 10.7 G/DL (ref 13.7–17.6)
IMM GRANULOCYTES # BLD: 0.02 10*3/MM3 (ref 0–0.03)
IMM GRANULOCYTES NFR BLD: 0.3 % (ref 0–0.5)
LYMPHOCYTES # BLD AUTO: 2.15 10*3/MM3 (ref 0.9–4.8)
LYMPHOCYTES NFR BLD AUTO: 28.9 % (ref 19.6–45.3)
MCH RBC QN AUTO: 23.6 PG (ref 27–32.7)
MCHC RBC AUTO-ENTMCNC: 29.1 G/DL (ref 32.6–36.4)
MCV RBC AUTO: 81.1 FL (ref 79.8–96.2)
MONOCYTES # BLD AUTO: 0.68 10*3/MM3 (ref 0.2–1.2)
MONOCYTES NFR BLD AUTO: 9.1 % (ref 5–12)
NEUTROPHILS # BLD AUTO: 4.5 10*3/MM3 (ref 1.9–8.1)
NEUTROPHILS NFR BLD AUTO: 60.4 % (ref 42.7–76)
PLATELET # BLD AUTO: 342 10*3/MM3 (ref 140–500)
RBC # BLD AUTO: 4.54 10*6/MM3 (ref 4.6–6)
WBC # BLD AUTO: 7.45 10*3/MM3 (ref 4.5–10.7)

## 2017-09-07 ENCOUNTER — TELEPHONE (OUTPATIENT)
Dept: FAMILY MEDICINE CLINIC | Facility: CLINIC | Age: 57
End: 2017-09-07

## 2018-12-20 ENCOUNTER — OFFICE VISIT (OUTPATIENT)
Dept: FAMILY MEDICINE CLINIC | Facility: CLINIC | Age: 58
End: 2018-12-20

## 2018-12-20 VITALS
OXYGEN SATURATION: 98 % | TEMPERATURE: 98.1 F | SYSTOLIC BLOOD PRESSURE: 160 MMHG | WEIGHT: 245.2 LBS | DIASTOLIC BLOOD PRESSURE: 120 MMHG | BODY MASS INDEX: 31.48 KG/M2 | HEART RATE: 82 BPM

## 2018-12-20 DIAGNOSIS — I10 ESSENTIAL HYPERTENSION: Primary | ICD-10-CM

## 2018-12-20 DIAGNOSIS — R10.32 LEFT LOWER QUADRANT PAIN: ICD-10-CM

## 2018-12-20 PROCEDURE — 99215 OFFICE O/P EST HI 40 MIN: CPT | Performed by: NURSE PRACTITIONER

## 2018-12-21 ENCOUNTER — TELEPHONE (OUTPATIENT)
Dept: FAMILY MEDICINE CLINIC | Facility: CLINIC | Age: 58
End: 2018-12-21

## 2018-12-21 RX ORDER — LISINOPRIL 5 MG/1
5 TABLET ORAL DAILY
Qty: 30 TABLET | Refills: 2 | Status: SHIPPED | OUTPATIENT
Start: 2018-12-21 | End: 2018-12-28 | Stop reason: HOSPADM

## 2018-12-21 NOTE — TELEPHONE ENCOUNTER
Pt states went to [facility] yesterday, was diagnosed with diverticulitis with CT scan, treated with cipro and flagyl x 10 days. States BP was increased at 194/124, was not started on any medication. Was instructed to stop testosterone, states ER physician thought the elevated BP was related to infection, pain and testosterone.     Recommend pt start low dose BP medication, will adjust accordingly, follow up in 2-4 weeks, sooner if BP is still uncontrolled. Recommend ER follow up for chest pain, headache with worsening BP. Pt verbalized understanding.    Unable to obtain records, [facility]where not available.     Will call to obtain records.

## 2018-12-26 ENCOUNTER — APPOINTMENT (OUTPATIENT)
Dept: GENERAL RADIOLOGY | Facility: HOSPITAL | Age: 58
End: 2018-12-26
Attending: EMERGENCY MEDICINE

## 2018-12-26 ENCOUNTER — HOSPITAL ENCOUNTER (INPATIENT)
Facility: HOSPITAL | Age: 58
LOS: 2 days | Discharge: HOME | End: 2018-12-28
Attending: EMERGENCY MEDICINE | Admitting: INTERNAL MEDICINE

## 2018-12-26 ENCOUNTER — APPOINTMENT (OUTPATIENT)
Dept: CT IMAGING | Facility: HOSPITAL | Age: 58
End: 2018-12-26

## 2018-12-26 ENCOUNTER — APPOINTMENT (OUTPATIENT)
Dept: GENERAL RADIOLOGY | Facility: HOSPITAL | Age: 58
End: 2018-12-26

## 2018-12-26 ENCOUNTER — APPOINTMENT (OUTPATIENT)
Dept: CARDIOLOGY | Facility: HOSPITAL | Age: 58
End: 2018-12-26
Attending: INTERNAL MEDICINE

## 2018-12-26 DIAGNOSIS — I21.21 ST ELEVATION MYOCARDIAL INFARCTION INVOLVING LEFT CIRCUMFLEX CORONARY ARTERY (HCC): ICD-10-CM

## 2018-12-26 DIAGNOSIS — I21.3 ST ELEVATION MYOCARDIAL INFARCTION (STEMI), UNSPECIFIED ARTERY (HCC): Primary | ICD-10-CM

## 2018-12-26 DIAGNOSIS — N39.0 ACUTE UTI (URINARY TRACT INFECTION): ICD-10-CM

## 2018-12-26 LAB
ALBUMIN SERPL-MCNC: 4.4 G/DL (ref 3.5–5.2)
ALBUMIN/GLOB SERPL: 1.8 G/DL
ALP SERPL-CCNC: 82 U/L (ref 39–117)
ALT SERPL W P-5'-P-CCNC: 37 U/L (ref 1–41)
ANION GAP SERPL CALCULATED.3IONS-SCNC: 13 MMOL/L
AST SERPL-CCNC: 27 U/L (ref 1–40)
BASOPHILS # BLD AUTO: 0.04 10*3/MM3 (ref 0–0.2)
BASOPHILS NFR BLD AUTO: 0.4 % (ref 0–1.5)
BILIRUB SERPL-MCNC: 0.3 MG/DL (ref 0.1–1.2)
BUN BLD-MCNC: 12 MG/DL (ref 6–20)
BUN/CREAT SERPL: 10.4 (ref 7–25)
CALCIUM SPEC-SCNC: 8.8 MG/DL (ref 8.6–10.5)
CHLORIDE SERPL-SCNC: 103 MMOL/L (ref 98–107)
CO2 SERPL-SCNC: 25 MMOL/L (ref 22–29)
CREAT BLD-MCNC: 1.15 MG/DL (ref 0.76–1.27)
DEPRECATED RDW RBC AUTO: 45.1 FL (ref 37–54)
EOSINOPHIL # BLD AUTO: 0.07 10*3/MM3 (ref 0–0.7)
EOSINOPHIL NFR BLD AUTO: 0.8 % (ref 0.3–6.2)
ERYTHROCYTE [DISTWIDTH] IN BLOOD BY AUTOMATED COUNT: 13.7 % (ref 11.5–14.5)
GFR SERPL CREATININE-BSD FRML MDRD: 66 ML/MIN/1.73
GLOBULIN UR ELPH-MCNC: 2.5 GM/DL
GLUCOSE BLD-MCNC: 100 MG/DL (ref 65–99)
GLUCOSE BLDC GLUCOMTR-MCNC: 95 MG/DL (ref 70–130)
GLUCOSE BLDC GLUCOMTR-MCNC: 98 MG/DL (ref 70–130)
HCT VFR BLD AUTO: 52.9 % (ref 40.4–52.2)
HGB BLD-MCNC: 18.6 G/DL (ref 13.7–17.6)
HOLD SPECIMEN: NORMAL
IMM GRANULOCYTES # BLD AUTO: 0.06 10*3/MM3 (ref 0–0.03)
IMM GRANULOCYTES NFR BLD AUTO: 0.6 % (ref 0–0.5)
LIPASE SERPL-CCNC: 30 U/L (ref 13–60)
LYMPHOCYTES # BLD AUTO: 1.93 10*3/MM3 (ref 0.9–4.8)
LYMPHOCYTES NFR BLD AUTO: 20.8 % (ref 19.6–45.3)
MCH RBC QN AUTO: 32.1 PG (ref 27–32.7)
MCHC RBC AUTO-ENTMCNC: 35.2 G/DL (ref 32.6–36.4)
MCV RBC AUTO: 91.4 FL (ref 79.8–96.2)
MONOCYTES # BLD AUTO: 0.71 10*3/MM3 (ref 0.2–1.2)
MONOCYTES NFR BLD AUTO: 7.6 % (ref 5–12)
NEUTROPHILS # BLD AUTO: 6.54 10*3/MM3 (ref 1.9–8.1)
NEUTROPHILS NFR BLD AUTO: 70.4 % (ref 42.7–76)
PLATELET # BLD AUTO: 218 10*3/MM3 (ref 140–500)
PMV BLD AUTO: 9.4 FL (ref 6–12)
POTASSIUM BLD-SCNC: 4.2 MMOL/L (ref 3.5–5.2)
PROT SERPL-MCNC: 6.9 G/DL (ref 6–8.5)
RBC # BLD AUTO: 5.79 10*6/MM3 (ref 4.6–6)
SODIUM BLD-SCNC: 141 MMOL/L (ref 136–145)
TROPONIN T SERPL-MCNC: 0.01 NG/ML (ref 0–0.03)
WBC NRBC COR # BLD: 9.29 10*3/MM3 (ref 4.5–10.7)
WHOLE BLOOD HOLD SPECIMEN: NORMAL

## 2018-12-26 PROCEDURE — 92941 PRQ TRLML REVSC TOT OCCL AMI: CPT | Performed by: INTERNAL MEDICINE

## 2018-12-26 PROCEDURE — 85347 COAGULATION TIME ACTIVATED: CPT

## 2018-12-26 PROCEDURE — 74174 CTA ABD&PLVS W/CONTRAST: CPT

## 2018-12-26 PROCEDURE — C1725 CATH, TRANSLUMIN NON-LASER: HCPCS | Performed by: INTERNAL MEDICINE

## 2018-12-26 PROCEDURE — C1874 STENT, COATED/COV W/DEL SYS: HCPCS | Performed by: INTERNAL MEDICINE

## 2018-12-26 PROCEDURE — 84484 ASSAY OF TROPONIN QUANT: CPT | Performed by: EMERGENCY MEDICINE

## 2018-12-26 PROCEDURE — 25010000002 FENTANYL CITRATE (PF) 100 MCG/2ML SOLUTION: Performed by: EMERGENCY MEDICINE

## 2018-12-26 PROCEDURE — 93306 TTE W/DOPPLER COMPLETE: CPT | Performed by: INTERNAL MEDICINE

## 2018-12-26 PROCEDURE — 25010000002 FENTANYL CITRATE (PF) 100 MCG/2ML SOLUTION: Performed by: INTERNAL MEDICINE

## 2018-12-26 PROCEDURE — 25010000002 PERFLUTREN (DEFINITY) 8.476 MG IN SODIUM CHLORIDE 0.9 % 10 ML INJECTION: Performed by: INTERNAL MEDICINE

## 2018-12-26 PROCEDURE — 99223 1ST HOSP IP/OBS HIGH 75: CPT | Performed by: INTERNAL MEDICINE

## 2018-12-26 PROCEDURE — 93306 TTE W/DOPPLER COMPLETE: CPT

## 2018-12-26 PROCEDURE — 93005 ELECTROCARDIOGRAM TRACING: CPT | Performed by: EMERGENCY MEDICINE

## 2018-12-26 PROCEDURE — 85025 COMPLETE CBC W/AUTO DIFF WBC: CPT | Performed by: EMERGENCY MEDICINE

## 2018-12-26 PROCEDURE — 71275 CT ANGIOGRAPHY CHEST: CPT

## 2018-12-26 PROCEDURE — 25010000002 HEPARIN (PORCINE) PER 1000 UNITS: Performed by: EMERGENCY MEDICINE

## 2018-12-26 PROCEDURE — 93458 L HRT ARTERY/VENTRICLE ANGIO: CPT | Performed by: INTERNAL MEDICINE

## 2018-12-26 PROCEDURE — C1757 CATH, THROMBECTOMY/EMBOLECT: HCPCS | Performed by: INTERNAL MEDICINE

## 2018-12-26 PROCEDURE — 25010000002 MORPHINE (PF) 10 MG/ML SOLUTION: Performed by: INTERNAL MEDICINE

## 2018-12-26 PROCEDURE — 0 IOPAMIDOL PER 1 ML: Performed by: EMERGENCY MEDICINE

## 2018-12-26 PROCEDURE — B2151ZZ FLUOROSCOPY OF LEFT HEART USING LOW OSMOLAR CONTRAST: ICD-10-PCS | Performed by: INTERNAL MEDICINE

## 2018-12-26 PROCEDURE — 93005 ELECTROCARDIOGRAM TRACING: CPT | Performed by: INTERNAL MEDICINE

## 2018-12-26 PROCEDURE — C1887 CATHETER, GUIDING: HCPCS | Performed by: INTERNAL MEDICINE

## 2018-12-26 PROCEDURE — 4A023N7 MEASUREMENT OF CARDIAC SAMPLING AND PRESSURE, LEFT HEART, PERCUTANEOUS APPROACH: ICD-10-PCS | Performed by: INTERNAL MEDICINE

## 2018-12-26 PROCEDURE — 99153 MOD SED SAME PHYS/QHP EA: CPT | Performed by: INTERNAL MEDICINE

## 2018-12-26 PROCEDURE — 82962 GLUCOSE BLOOD TEST: CPT

## 2018-12-26 PROCEDURE — C1769 GUIDE WIRE: HCPCS | Performed by: INTERNAL MEDICINE

## 2018-12-26 PROCEDURE — 99152 MOD SED SAME PHYS/QHP 5/>YRS: CPT | Performed by: INTERNAL MEDICINE

## 2018-12-26 PROCEDURE — 80053 COMPREHEN METABOLIC PANEL: CPT | Performed by: EMERGENCY MEDICINE

## 2018-12-26 PROCEDURE — 0 IOPAMIDOL PER 1 ML: Performed by: INTERNAL MEDICINE

## 2018-12-26 PROCEDURE — B2111ZZ FLUOROSCOPY OF MULTIPLE CORONARY ARTERIES USING LOW OSMOLAR CONTRAST: ICD-10-PCS | Performed by: INTERNAL MEDICINE

## 2018-12-26 PROCEDURE — 99285 EMERGENCY DEPT VISIT HI MDM: CPT

## 2018-12-26 PROCEDURE — 25010000002 BH (CUPID ONLY) ADENOSINE 6 MG/100ML MIXTURE: Performed by: INTERNAL MEDICINE

## 2018-12-26 PROCEDURE — 93010 ELECTROCARDIOGRAM REPORT: CPT | Performed by: INTERNAL MEDICINE

## 2018-12-26 PROCEDURE — 02C03ZZ EXTIRPATION OF MATTER FROM CORONARY ARTERY, ONE ARTERY, PERCUTANEOUS APPROACH: ICD-10-PCS | Performed by: INTERNAL MEDICINE

## 2018-12-26 PROCEDURE — 83690 ASSAY OF LIPASE: CPT | Performed by: EMERGENCY MEDICINE

## 2018-12-26 PROCEDURE — 25010000002 ONDANSETRON PER 1 MG: Performed by: INTERNAL MEDICINE

## 2018-12-26 PROCEDURE — 25010000002 MIDAZOLAM PER 1 MG: Performed by: INTERNAL MEDICINE

## 2018-12-26 PROCEDURE — 25010000002 HEPARIN (PORCINE) PER 1000 UNITS: Performed by: INTERNAL MEDICINE

## 2018-12-26 PROCEDURE — C1894 INTRO/SHEATH, NON-LASER: HCPCS | Performed by: INTERNAL MEDICINE

## 2018-12-26 PROCEDURE — 71045 X-RAY EXAM CHEST 1 VIEW: CPT

## 2018-12-26 PROCEDURE — 027035Z DILATION OF CORONARY ARTERY, ONE ARTERY WITH TWO DRUG-ELUTING INTRALUMINAL DEVICES, PERCUTANEOUS APPROACH: ICD-10-PCS | Performed by: INTERNAL MEDICINE

## 2018-12-26 PROCEDURE — C9606 PERC D-E COR REVASC W AMI S: HCPCS | Performed by: INTERNAL MEDICINE

## 2018-12-26 DEVICE — XIENCE SIERRA™ EVEROLIMUS ELUTING CORONARY STENT SYSTEM 3.00 MM X 15 MM / RAPID-EXCHANGE
Type: IMPLANTABLE DEVICE | Status: FUNCTIONAL
Brand: XIENCE SIERRA™

## 2018-12-26 DEVICE — XIENCE SIERRA™ EVEROLIMUS ELUTING CORONARY STENT SYSTEM 3.00 MM X 33 MM / RAPID-EXCHANGE
Type: IMPLANTABLE DEVICE | Status: FUNCTIONAL
Brand: XIENCE SIERRA™

## 2018-12-26 RX ORDER — ACETAMINOPHEN 325 MG/1
650 TABLET ORAL EVERY 4 HOURS PRN
Status: DISCONTINUED | OUTPATIENT
Start: 2018-12-26 | End: 2018-12-28 | Stop reason: HOSPADM

## 2018-12-26 RX ORDER — PRASUGREL 10 MG/1
TABLET, FILM COATED ORAL AS NEEDED
Status: DISCONTINUED | OUTPATIENT
Start: 2018-12-26 | End: 2018-12-26 | Stop reason: HOSPADM

## 2018-12-26 RX ORDER — ATORVASTATIN CALCIUM 80 MG/1
80 TABLET, FILM COATED ORAL NIGHTLY
Status: DISCONTINUED | OUTPATIENT
Start: 2018-12-26 | End: 2018-12-28 | Stop reason: HOSPADM

## 2018-12-26 RX ORDER — HEPARIN SODIUM 1000 [USP'U]/ML
INJECTION, SOLUTION INTRAVENOUS; SUBCUTANEOUS AS NEEDED
Status: DISCONTINUED | OUTPATIENT
Start: 2018-12-26 | End: 2018-12-26 | Stop reason: HOSPADM

## 2018-12-26 RX ORDER — CHOLECALCIFEROL (VITAMIN D3) 125 MCG
5 CAPSULE ORAL NIGHTLY PRN
Status: DISCONTINUED | OUTPATIENT
Start: 2018-12-26 | End: 2018-12-28 | Stop reason: HOSPADM

## 2018-12-26 RX ORDER — MORPHINE SULFATE 2 MG/ML
2 INJECTION, SOLUTION INTRAMUSCULAR; INTRAVENOUS ONCE
Status: COMPLETED | OUTPATIENT
Start: 2018-12-26 | End: 2018-12-26

## 2018-12-26 RX ORDER — HEPARIN SODIUM 5000 [USP'U]/ML
4000 INJECTION, SOLUTION INTRAVENOUS; SUBCUTANEOUS ONCE
Status: DISCONTINUED | OUTPATIENT
Start: 2018-12-26 | End: 2018-12-28

## 2018-12-26 RX ORDER — SODIUM CHLORIDE 0.9 % (FLUSH) 0.9 %
10 SYRINGE (ML) INJECTION AS NEEDED
Status: DISCONTINUED | OUTPATIENT
Start: 2018-12-26 | End: 2018-12-28 | Stop reason: HOSPADM

## 2018-12-26 RX ORDER — FENTANYL CITRATE 50 UG/ML
100 INJECTION, SOLUTION INTRAMUSCULAR; INTRAVENOUS ONCE
Status: COMPLETED | OUTPATIENT
Start: 2018-12-26 | End: 2018-12-26

## 2018-12-26 RX ORDER — FENTANYL CITRATE 50 UG/ML
INJECTION, SOLUTION INTRAMUSCULAR; INTRAVENOUS AS NEEDED
Status: DISCONTINUED | OUTPATIENT
Start: 2018-12-26 | End: 2018-12-26 | Stop reason: HOSPADM

## 2018-12-26 RX ORDER — LISINOPRIL 5 MG/1
5 TABLET ORAL DAILY
Status: DISCONTINUED | OUTPATIENT
Start: 2018-12-26 | End: 2018-12-27

## 2018-12-26 RX ORDER — METRONIDAZOLE 500 MG/1
500 TABLET ORAL 3 TIMES DAILY
COMMUNITY
Start: 2018-12-21 | End: 2018-12-28 | Stop reason: HOSPADM

## 2018-12-26 RX ORDER — PRASUGREL 10 MG/1
10 TABLET, FILM COATED ORAL DAILY
Status: DISCONTINUED | OUTPATIENT
Start: 2018-12-27 | End: 2018-12-28 | Stop reason: HOSPADM

## 2018-12-26 RX ORDER — ONDANSETRON 2 MG/ML
4 INJECTION INTRAMUSCULAR; INTRAVENOUS EVERY 6 HOURS PRN
Status: DISCONTINUED | OUTPATIENT
Start: 2018-12-26 | End: 2018-12-28 | Stop reason: HOSPADM

## 2018-12-26 RX ORDER — LEVOTHYROXINE SODIUM 0.03 MG/1
25 TABLET ORAL
Status: DISCONTINUED | OUTPATIENT
Start: 2018-12-27 | End: 2018-12-27 | Stop reason: ALTCHOICE

## 2018-12-26 RX ORDER — MIDAZOLAM HYDROCHLORIDE 1 MG/ML
INJECTION INTRAMUSCULAR; INTRAVENOUS AS NEEDED
Status: DISCONTINUED | OUTPATIENT
Start: 2018-12-26 | End: 2018-12-26 | Stop reason: HOSPADM

## 2018-12-26 RX ORDER — METRONIDAZOLE 500 MG/1
500 TABLET ORAL EVERY 8 HOURS SCHEDULED
Status: DISCONTINUED | OUTPATIENT
Start: 2018-12-26 | End: 2018-12-28 | Stop reason: HOSPADM

## 2018-12-26 RX ORDER — LIDOCAINE HYDROCHLORIDE 20 MG/ML
INJECTION, SOLUTION INFILTRATION; PERINEURAL AS NEEDED
Status: DISCONTINUED | OUTPATIENT
Start: 2018-12-26 | End: 2018-12-26 | Stop reason: HOSPADM

## 2018-12-26 RX ORDER — SODIUM CHLORIDE 9 MG/ML
100 INJECTION, SOLUTION INTRAVENOUS CONTINUOUS
Status: DISCONTINUED | OUTPATIENT
Start: 2018-12-26 | End: 2018-12-28 | Stop reason: HOSPADM

## 2018-12-26 RX ORDER — CIPROFLOXACIN 500 MG/1
500 TABLET, FILM COATED ORAL 2 TIMES DAILY
COMMUNITY
Start: 2018-12-21 | End: 2018-12-28 | Stop reason: HOSPADM

## 2018-12-26 RX ORDER — CIPROFLOXACIN 500 MG/1
500 TABLET, FILM COATED ORAL 2 TIMES DAILY
Status: DISCONTINUED | OUTPATIENT
Start: 2018-12-26 | End: 2018-12-28 | Stop reason: HOSPADM

## 2018-12-26 RX ORDER — PRASTERONE (DHEA) 25 MG
25 CAPSULE ORAL 2 TIMES DAILY
COMMUNITY
End: 2018-12-28 | Stop reason: HOSPADM

## 2018-12-26 RX ORDER — SODIUM CHLORIDE 9 MG/ML
INJECTION, SOLUTION INTRAVENOUS CONTINUOUS PRN
Status: COMPLETED | OUTPATIENT
Start: 2018-12-26 | End: 2018-12-26

## 2018-12-26 RX ADMIN — ATORVASTATIN CALCIUM 80 MG: 80 TABLET, FILM COATED ORAL at 20:45

## 2018-12-26 RX ADMIN — METOPROLOL TARTRATE 25 MG: 25 TABLET ORAL at 18:14

## 2018-12-26 RX ADMIN — NITROGLYCERIN 1 INCH: 20 OINTMENT TOPICAL at 11:55

## 2018-12-26 RX ADMIN — METRONIDAZOLE 500 MG: 500 TABLET, FILM COATED ORAL at 20:45

## 2018-12-26 RX ADMIN — SODIUM CHLORIDE 100 ML/HR: 9 INJECTION, SOLUTION INTRAVENOUS at 15:41

## 2018-12-26 RX ADMIN — CIPROFLOXACIN 500 MG: 500 TABLET, FILM COATED ORAL at 20:45

## 2018-12-26 RX ADMIN — MORPHINE SULFATE 2 MG: 10 INJECTION INTRAVENOUS at 12:20

## 2018-12-26 RX ADMIN — FENTANYL CITRATE 100 MCG: 50 INJECTION, SOLUTION INTRAMUSCULAR; INTRAVENOUS at 11:57

## 2018-12-26 RX ADMIN — IOPAMIDOL 95 ML: 755 INJECTION, SOLUTION INTRAVENOUS at 12:37

## 2018-12-26 RX ADMIN — Medication 5 MG: at 23:06

## 2018-12-26 RX ADMIN — ONDANSETRON 4 MG: 2 INJECTION INTRAMUSCULAR; INTRAVENOUS at 15:13

## 2018-12-26 RX ADMIN — PERFLUTREN 2 ML: 6.52 INJECTION, SUSPENSION INTRAVENOUS at 17:00

## 2018-12-27 LAB
ACT BLD: 235 SECONDS (ref 82–152)
ACT BLD: 241 SECONDS (ref 82–152)
ACT BLD: 279 SECONDS (ref 82–152)
ANION GAP SERPL CALCULATED.3IONS-SCNC: 8.8 MMOL/L
BH CV ECHO MEAS - ACS: 2.3 CM
BH CV ECHO MEAS - AO MAX PG (FULL): 3.5 MMHG
BH CV ECHO MEAS - AO MAX PG: 7.7 MMHG
BH CV ECHO MEAS - AO MEAN PG (FULL): 2 MMHG
BH CV ECHO MEAS - AO MEAN PG: 4 MMHG
BH CV ECHO MEAS - AO ROOT AREA (BSA CORRECTED): 1.5
BH CV ECHO MEAS - AO ROOT AREA: 10.2 CM^2
BH CV ECHO MEAS - AO ROOT DIAM: 3.6 CM
BH CV ECHO MEAS - AO V2 MAX: 139 CM/SEC
BH CV ECHO MEAS - AO V2 MEAN: 86 CM/SEC
BH CV ECHO MEAS - AO V2 VTI: 23.5 CM
BH CV ECHO MEAS - AVA(I,A): 3.9 CM^2
BH CV ECHO MEAS - AVA(I,D): 3.9 CM^2
BH CV ECHO MEAS - AVA(V,A): 3.4 CM^2
BH CV ECHO MEAS - AVA(V,D): 3.4 CM^2
BH CV ECHO MEAS - BSA(HAYCOCK): 2.4 M^2
BH CV ECHO MEAS - BSA: 2.4 M^2
BH CV ECHO MEAS - BZI_BMI: 31.1 KILOGRAMS/M^2
BH CV ECHO MEAS - BZI_METRIC_HEIGHT: 188 CM
BH CV ECHO MEAS - BZI_METRIC_WEIGHT: 109.8 KG
BH CV ECHO MEAS - EDV(CUBED): 148.9 ML
BH CV ECHO MEAS - EDV(MOD-SP2): 115 ML
BH CV ECHO MEAS - EDV(MOD-SP4): 107 ML
BH CV ECHO MEAS - EDV(TEICH): 135.3 ML
BH CV ECHO MEAS - EF(CUBED): 60.2 %
BH CV ECHO MEAS - EF(MOD-BP): 53 %
BH CV ECHO MEAS - EF(MOD-SP2): 50.4 %
BH CV ECHO MEAS - EF(MOD-SP4): 56.1 %
BH CV ECHO MEAS - EF(TEICH): 51.3 %
BH CV ECHO MEAS - ESV(CUBED): 59.3 ML
BH CV ECHO MEAS - ESV(MOD-SP2): 57 ML
BH CV ECHO MEAS - ESV(MOD-SP4): 47 ML
BH CV ECHO MEAS - ESV(TEICH): 65.9 ML
BH CV ECHO MEAS - FS: 26.4 %
BH CV ECHO MEAS - IVS/LVPW: 1.4
BH CV ECHO MEAS - IVSD: 1.8 CM
BH CV ECHO MEAS - LAT PEAK E' VEL: 4 CM/SEC
BH CV ECHO MEAS - LV DIASTOLIC VOL/BSA (35-75): 45.4 ML/M^2
BH CV ECHO MEAS - LV MASS(C)D: 369.9 GRAMS
BH CV ECHO MEAS - LV MASS(C)DI: 157 GRAMS/M^2
BH CV ECHO MEAS - LV MAX PG: 4.2 MMHG
BH CV ECHO MEAS - LV MEAN PG: 2 MMHG
BH CV ECHO MEAS - LV SYSTOLIC VOL/BSA (12-30): 19.9 ML/M^2
BH CV ECHO MEAS - LV V1 MAX: 103 CM/SEC
BH CV ECHO MEAS - LV V1 MEAN: 65.4 CM/SEC
BH CV ECHO MEAS - LV V1 VTI: 20 CM
BH CV ECHO MEAS - LVIDD: 5.3 CM
BH CV ECHO MEAS - LVIDS: 3.9 CM
BH CV ECHO MEAS - LVLD AP2: 8.6 CM
BH CV ECHO MEAS - LVLD AP4: 8.3 CM
BH CV ECHO MEAS - LVLS AP2: 7.2 CM
BH CV ECHO MEAS - LVLS AP4: 8.1 CM
BH CV ECHO MEAS - LVOT AREA (M): 4.5 CM^2
BH CV ECHO MEAS - LVOT AREA: 4.5 CM^2
BH CV ECHO MEAS - LVOT DIAM: 2.4 CM
BH CV ECHO MEAS - LVPWD: 1.3 CM
BH CV ECHO MEAS - MED PEAK E' VEL: 5 CM/SEC
BH CV ECHO MEAS - MV A DUR: 0.09 SEC
BH CV ECHO MEAS - MV A MAX VEL: 93.1 CM/SEC
BH CV ECHO MEAS - MV DEC SLOPE: 235 CM/SEC^2
BH CV ECHO MEAS - MV DEC TIME: 0.16 SEC
BH CV ECHO MEAS - MV E MAX VEL: 58.7 CM/SEC
BH CV ECHO MEAS - MV E/A: 0.63
BH CV ECHO MEAS - MV MAX PG: 3.1 MMHG
BH CV ECHO MEAS - MV MEAN PG: 1 MMHG
BH CV ECHO MEAS - MV P1/2T MAX VEL: 71.9 CM/SEC
BH CV ECHO MEAS - MV P1/2T: 89.6 MSEC
BH CV ECHO MEAS - MV V2 MAX: 87.5 CM/SEC
BH CV ECHO MEAS - MV V2 MEAN: 50.4 CM/SEC
BH CV ECHO MEAS - MV V2 VTI: 23.2 CM
BH CV ECHO MEAS - MVA P1/2T LCG: 3.1 CM^2
BH CV ECHO MEAS - MVA(P1/2T): 2.5 CM^2
BH CV ECHO MEAS - MVA(VTI): 3.9 CM^2
BH CV ECHO MEAS - PA ACC TIME: 0.12 SEC
BH CV ECHO MEAS - PA MAX PG (FULL): 0.85 MMHG
BH CV ECHO MEAS - PA MAX PG: 2.4 MMHG
BH CV ECHO MEAS - PA PR(ACCEL): 23.7 MMHG
BH CV ECHO MEAS - PA V2 MAX: 77.4 CM/SEC
BH CV ECHO MEAS - PULM A REVS DUR: 0.11 SEC
BH CV ECHO MEAS - PULM A REVS VEL: 29.3 CM/SEC
BH CV ECHO MEAS - PULM DIAS VEL: 22.6 CM/SEC
BH CV ECHO MEAS - PULM S/D: 1.7
BH CV ECHO MEAS - PULM SYS VEL: 39 CM/SEC
BH CV ECHO MEAS - PVA(V,A): 5.7 CM^2
BH CV ECHO MEAS - PVA(V,D): 5.7 CM^2
BH CV ECHO MEAS - QP/QS: 1.1
BH CV ECHO MEAS - RV MAX PG: 1.5 MMHG
BH CV ECHO MEAS - RV MEAN PG: 1 MMHG
BH CV ECHO MEAS - RV V1 MAX: 62.1 CM/SEC
BH CV ECHO MEAS - RV V1 MEAN: 41.6 CM/SEC
BH CV ECHO MEAS - RV V1 VTI: 14.3 CM
BH CV ECHO MEAS - RVOT AREA: 7.1 CM^2
BH CV ECHO MEAS - RVOT DIAM: 3 CM
BH CV ECHO MEAS - SI(AO): 101.5 ML/M^2
BH CV ECHO MEAS - SI(CUBED): 38 ML/M^2
BH CV ECHO MEAS - SI(LVOT): 38.4 ML/M^2
BH CV ECHO MEAS - SI(MOD-SP2): 24.6 ML/M^2
BH CV ECHO MEAS - SI(MOD-SP4): 25.5 ML/M^2
BH CV ECHO MEAS - SI(TEICH): 29.5 ML/M^2
BH CV ECHO MEAS - SV(AO): 239.2 ML
BH CV ECHO MEAS - SV(CUBED): 89.6 ML
BH CV ECHO MEAS - SV(LVOT): 90.5 ML
BH CV ECHO MEAS - SV(MOD-SP2): 58 ML
BH CV ECHO MEAS - SV(MOD-SP4): 60 ML
BH CV ECHO MEAS - SV(RVOT): 101.1 ML
BH CV ECHO MEAS - SV(TEICH): 69.4 ML
BH CV ECHO MEAS - TAPSE (>1.6): 2.3 CM2
BH CV ECHO MEASUREMENTS AVERAGE E/E' RATIO: 13.04
BH CV VAS BP RIGHT ARM: NORMAL MMHG
BH CV XLRA - RV BASE: 5.2 CM
BH CV XLRA - TDI S': 17 CM/SEC
BUN BLD-MCNC: 12 MG/DL (ref 6–20)
BUN/CREAT SERPL: 12.5 (ref 7–25)
CALCIUM SPEC-SCNC: 8.2 MG/DL (ref 8.6–10.5)
CHLORIDE SERPL-SCNC: 108 MMOL/L (ref 98–107)
CHOLEST SERPL-MCNC: 116 MG/DL (ref 0–200)
CO2 SERPL-SCNC: 22.2 MMOL/L (ref 22–29)
CREAT BLD-MCNC: 0.96 MG/DL (ref 0.76–1.27)
DEPRECATED RDW RBC AUTO: 50.5 FL (ref 37–54)
ERYTHROCYTE [DISTWIDTH] IN BLOOD BY AUTOMATED COUNT: 14.2 % (ref 11.5–14.5)
GFR SERPL CREATININE-BSD FRML MDRD: 81 ML/MIN/1.73
GLUCOSE BLD-MCNC: 77 MG/DL (ref 65–99)
GLUCOSE BLDC GLUCOMTR-MCNC: 84 MG/DL (ref 70–130)
HBA1C MFR BLD: 4.8 % (ref 4.8–5.6)
HCT VFR BLD AUTO: 48 % (ref 40.4–52.2)
HDLC SERPL-MCNC: 32 MG/DL (ref 40–60)
HGB BLD-MCNC: 15.7 G/DL (ref 13.7–17.6)
LDLC SERPL CALC-MCNC: 66 MG/DL (ref 0–100)
LDLC/HDLC SERPL: 2.07 {RATIO}
LEFT ATRIUM VOLUME INDEX: 29 ML/M2
MAXIMAL PREDICTED HEART RATE: 163 BPM
MCH RBC QN AUTO: 31.7 PG (ref 27–32.7)
MCHC RBC AUTO-ENTMCNC: 32.7 G/DL (ref 32.6–36.4)
MCV RBC AUTO: 97 FL (ref 79.8–96.2)
PLATELET # BLD AUTO: 194 10*3/MM3 (ref 140–500)
PMV BLD AUTO: 9.2 FL (ref 6–12)
POTASSIUM BLD-SCNC: 4.2 MMOL/L (ref 3.5–5.2)
RBC # BLD AUTO: 4.95 10*6/MM3 (ref 4.6–6)
SODIUM BLD-SCNC: 139 MMOL/L (ref 136–145)
STRESS TARGET HR: 139 BPM
T3FREE SERPL-MCNC: 2.36 PG/ML (ref 2–4.4)
T4 FREE SERPL-MCNC: 0.56 NG/DL (ref 0.93–1.7)
TRIGL SERPL-MCNC: 89 MG/DL (ref 0–150)
TSH SERPL DL<=0.05 MIU/L-ACNC: 12.93 MIU/ML (ref 0.27–4.2)
VLDLC SERPL-MCNC: 17.8 MG/DL (ref 5–40)
WBC NRBC COR # BLD: 10.92 10*3/MM3 (ref 4.5–10.7)

## 2018-12-27 PROCEDURE — 99231 SBSQ HOSP IP/OBS SF/LOW 25: CPT | Performed by: INTERNAL MEDICINE

## 2018-12-27 PROCEDURE — 85027 COMPLETE CBC AUTOMATED: CPT | Performed by: INTERNAL MEDICINE

## 2018-12-27 PROCEDURE — 82962 GLUCOSE BLOOD TEST: CPT

## 2018-12-27 PROCEDURE — 84481 FREE ASSAY (FT-3): CPT | Performed by: INTERNAL MEDICINE

## 2018-12-27 PROCEDURE — 84439 ASSAY OF FREE THYROXINE: CPT | Performed by: INTERNAL MEDICINE

## 2018-12-27 PROCEDURE — 83036 HEMOGLOBIN GLYCOSYLATED A1C: CPT | Performed by: INTERNAL MEDICINE

## 2018-12-27 PROCEDURE — 80048 BASIC METABOLIC PNL TOTAL CA: CPT | Performed by: INTERNAL MEDICINE

## 2018-12-27 PROCEDURE — 84443 ASSAY THYROID STIM HORMONE: CPT | Performed by: INTERNAL MEDICINE

## 2018-12-27 PROCEDURE — 80061 LIPID PANEL: CPT | Performed by: INTERNAL MEDICINE

## 2018-12-27 PROCEDURE — 99255 IP/OBS CONSLTJ NEW/EST HI 80: CPT | Performed by: INTERNAL MEDICINE

## 2018-12-27 RX ORDER — LISINOPRIL 10 MG/1
10 TABLET ORAL DAILY
Status: DISCONTINUED | OUTPATIENT
Start: 2018-12-28 | End: 2018-12-28 | Stop reason: HOSPADM

## 2018-12-27 RX ORDER — ASPIRIN 81 MG/1
81 TABLET, CHEWABLE ORAL DAILY
Status: DISCONTINUED | OUTPATIENT
Start: 2018-12-27 | End: 2018-12-28 | Stop reason: HOSPADM

## 2018-12-27 RX ADMIN — METRONIDAZOLE 500 MG: 500 TABLET, FILM COATED ORAL at 05:59

## 2018-12-27 RX ADMIN — CIPROFLOXACIN 500 MG: 500 TABLET, FILM COATED ORAL at 21:19

## 2018-12-27 RX ADMIN — METRONIDAZOLE 500 MG: 500 TABLET, FILM COATED ORAL at 14:05

## 2018-12-27 RX ADMIN — LISINOPRIL 5 MG: 5 TABLET ORAL at 09:30

## 2018-12-27 RX ADMIN — METOPROLOL TARTRATE 25 MG: 25 TABLET ORAL at 20:18

## 2018-12-27 RX ADMIN — METOPROLOL TARTRATE 25 MG: 25 TABLET ORAL at 09:15

## 2018-12-27 RX ADMIN — Medication 81 MG: at 12:21

## 2018-12-27 RX ADMIN — SODIUM CHLORIDE 100 ML/HR: 9 INJECTION, SOLUTION INTRAVENOUS at 02:14

## 2018-12-27 RX ADMIN — METRONIDAZOLE 500 MG: 500 TABLET, FILM COATED ORAL at 21:19

## 2018-12-27 RX ADMIN — CIPROFLOXACIN 500 MG: 500 TABLET, FILM COATED ORAL at 09:14

## 2018-12-27 RX ADMIN — PRASUGREL 10 MG: 10 TABLET, FILM COATED ORAL at 09:14

## 2018-12-27 RX ADMIN — ATORVASTATIN CALCIUM 80 MG: 80 TABLET, FILM COATED ORAL at 20:18

## 2018-12-28 VITALS
SYSTOLIC BLOOD PRESSURE: 151 MMHG | WEIGHT: 242 LBS | HEART RATE: 72 BPM | TEMPERATURE: 98.3 F | OXYGEN SATURATION: 97 % | RESPIRATION RATE: 18 BRPM | BODY MASS INDEX: 31.06 KG/M2 | DIASTOLIC BLOOD PRESSURE: 96 MMHG | HEIGHT: 74 IN

## 2018-12-28 LAB — TSH SERPL DL<=0.05 MIU/L-ACNC: 14.56 MIU/ML (ref 0.27–4.2)

## 2018-12-28 PROCEDURE — 99233 SBSQ HOSP IP/OBS HIGH 50: CPT | Performed by: INTERNAL MEDICINE

## 2018-12-28 PROCEDURE — 84443 ASSAY THYROID STIM HORMONE: CPT | Performed by: INTERNAL MEDICINE

## 2018-12-28 PROCEDURE — 99238 HOSP IP/OBS DSCHRG MGMT 30/<: CPT | Performed by: INTERNAL MEDICINE

## 2018-12-28 RX ORDER — LISINOPRIL 10 MG/1
10 TABLET ORAL DAILY
Qty: 30 TABLET | Refills: 11 | Status: SHIPPED | OUTPATIENT
Start: 2018-12-29 | End: 2019-01-04

## 2018-12-28 RX ORDER — PRASUGREL 10 MG/1
10 TABLET, FILM COATED ORAL DAILY
Qty: 30 TABLET | Refills: 11 | Status: SHIPPED | OUTPATIENT
Start: 2018-12-29 | End: 2020-01-13

## 2018-12-28 RX ORDER — CIPROFLOXACIN 500 MG/1
500 TABLET, FILM COATED ORAL 2 TIMES DAILY
Qty: 3 TABLET | Refills: 0 | Status: SHIPPED | OUTPATIENT
Start: 2018-12-28 | End: 2018-12-30

## 2018-12-28 RX ORDER — LEVOTHYROXINE SODIUM 0.15 MG/1
150 TABLET ORAL DAILY
Qty: 90 TABLET | Refills: 1 | Status: SHIPPED | OUTPATIENT
Start: 2018-12-28 | End: 2019-05-02 | Stop reason: SDUPTHER

## 2018-12-28 RX ORDER — ASPIRIN 81 MG/1
81 TABLET, CHEWABLE ORAL DAILY
Start: 2018-12-29

## 2018-12-28 RX ORDER — ATORVASTATIN CALCIUM 80 MG/1
80 TABLET, FILM COATED ORAL NIGHTLY
Qty: 30 TABLET | Refills: 11 | Status: SHIPPED | OUTPATIENT
Start: 2018-12-28 | End: 2020-01-07

## 2018-12-28 RX ORDER — LEVOTHYROXINE SODIUM 0.15 MG/1
150 TABLET ORAL
Status: DISCONTINUED | OUTPATIENT
Start: 2018-12-28 | End: 2018-12-28 | Stop reason: HOSPADM

## 2018-12-28 RX ADMIN — LISINOPRIL 10 MG: 10 TABLET ORAL at 08:47

## 2018-12-28 RX ADMIN — PRASUGREL 10 MG: 10 TABLET, FILM COATED ORAL at 08:47

## 2018-12-28 RX ADMIN — METRONIDAZOLE 500 MG: 500 TABLET, FILM COATED ORAL at 05:57

## 2018-12-28 RX ADMIN — METOPROLOL TARTRATE 25 MG: 25 TABLET ORAL at 08:47

## 2018-12-28 RX ADMIN — CIPROFLOXACIN 500 MG: 500 TABLET, FILM COATED ORAL at 08:47

## 2018-12-28 RX ADMIN — Medication 81 MG: at 08:47

## 2018-12-29 ENCOUNTER — READMISSION MANAGEMENT (OUTPATIENT)
Dept: CALL CENTER | Facility: HOSPITAL | Age: 58
End: 2018-12-29

## 2018-12-29 NOTE — OUTREACH NOTE
Prep Survey      Responses   Facility patient discharged from?  [facility]   Is patient eligible?  Yes   Discharge diagnosis  STEMI, heart cath and stent   Does the patient have one of the following disease processes/diagnoses(primary or secondary)?  Acute MI (STEMI,NSTEMI)   Does the patient have Home health ordered?  No   Is there a DME ordered?  No   Prep survey completed?  Yes          Addie Cabrera RN

## 2018-12-31 ENCOUNTER — TELEPHONE (OUTPATIENT)
Dept: CARDIAC REHAB | Facility: HOSPITAL | Age: 58
End: 2018-12-31

## 2018-12-31 NOTE — TELEPHONE ENCOUNTER
I left a message with my contact information to get this patient enrolled in Cardiac Rehab phase II.    Patient called back and wants to talk with Dr. Booker on Friday and then will decide if he will attend Cardiac Rehab.  He lives in [place], [place] and may attend at [facility] in [place].

## 2019-01-02 ENCOUNTER — READMISSION MANAGEMENT (OUTPATIENT)
Dept: CALL CENTER | Facility: HOSPITAL | Age: 59
End: 2019-01-02

## 2019-01-02 PROBLEM — Z95.5 S/P DRUG ELUTING CORONARY STENT PLACEMENT: Status: ACTIVE | Noted: 2019-01-02

## 2019-01-02 PROBLEM — E78.5 HYPERLIPIDEMIA LDL GOAL <70: Status: ACTIVE | Noted: 2019-01-02

## 2019-01-02 PROBLEM — I77.89 ASCENDING AORTA ENLARGEMENT (HCC): Status: ACTIVE | Noted: 2018-12-26

## 2019-01-02 PROBLEM — I25.119 CORONARY ARTERY DISEASE INVOLVING NATIVE CORONARY ARTERY OF NATIVE HEART WITH ANGINA PECTORIS (HCC): Status: ACTIVE | Noted: 2019-01-02

## 2019-01-02 NOTE — PROGRESS NOTES
Date of Office Visit: 2019  Encounter Provider: TASHA Mendez  Place of Service: [facility]  Patient Name: Garrett Gomez Jr.  :1960      Subjective:     Chief Complaint:  CAD status post STEMI with thrombectomy and 2 drug-eluting stent placement to circumflex    History of Present Illness:  Garrett Gomez Jr. is a pleasant 58 y.o. male who is new to me.  Outside records have been obtained and reviewed by me.  He has a past medical history of hypertension, hyperlipidemia and hypothyroidism.    The patient presented to the hospital on 18 with chest pain radiating to both arms and upper abdomen.  He had a CT angiogram of chest and abdomen to rule out dissection.  With an enlarged aorta but no evidence of dissection.  EKG ruled him in for STEMI with changes in V7 and V8 and he underwent cardiac catheterization which found occluded circumflex which was treated with thrombectomy and drug-eluting stent placement.  He also had severe mid LAD stenosis but Dr. Booker reported this will be addressed as an outpatient. He was discharged on 18 with aspirin, Effient, Lipitor, lisinopril and metoprolol tartrate.  His echo showed normal LV function with an EF of 53%.  He was chest pain-free at discharge.  It was also noted during his hospitalization that he was recently diagnosed with diverticulitis and has been treated with Cipro and Flagyl which were continued.  Also in regards to his hypothyroidism he was on a compounded T3-T4, DHEA and testosterone which he was receiving from [employer] and wanted to go back to Synth[facility].  Dr. Booker consulted endocrinology who switch the patient back to level thyroxine and had him discontinue his T3-T4 compound, DHEA and testosterone supplements. The patient is supposed to follow-up with Dr. Vu in 1 month. Also in the patient's chart it reports he has a history of atrial fibrillation and ventricular  fibrillation?    He is here today for a 1 week post PCI visit. The patient denies chest pain, shortness of breath, palpitations, edema, paroxysmal nocturnal dyspnea, orthopnea, dizziness, lightheadedness, syncope, near syncope or fatigue.  He denies any abdominal pain, nausea, vomiting, fevers or chills.  He reports he feels fantastic ever since his procedure.  He states he has way more energy than he had before.  He reports he is back on duty as a  and is having no issues at work.  He has not been back to exercising just yet.  He normally lives some weights and walks on the treadmill.  He denies any pain, drainage or numbness and tingling of his hand from his right radial access site.  The patient reports he thinks he used to snore but reports that his wife ever since she was treated for sleep apnea stated that he doesn't seem to snore anymore.  He is tolerating all his new medications without any side effects.  He denies any bleeding issues, blood in the urine or blood in the stool with his dual antiplatelet therapy.  He has a follow-up appointment with his PCP April 2019.        Cardiac Catheterization on 12/26/18-  1. HEMODYNAMICS:  /50, /80.     2. LEFT VENTRICULOGRAPHY:  LVEDP 10mmHg     3. CORONARY ANGIOGRAPHY:   Left main: Ectatic, very large let main. Bifurcates in to the LAD and circumflex.   LAD: The LAD is large caliber in the proximal vessel and tapers to medium caliber in mid and distal segments. There is at least moderate disease in the proximal vessel. The first diagonal is medium caliber and has mild ostial stenosis. The mid LAD has a 70-80% stenosis just at the bifurcation of the 2nd diagonal (1,0,0 lesion). The second diagonal has a mild to moderate mid-vessel stenosis. The distal LAD is medium caliber and is essentially normal and wraps the apex.  Left circumflex: The circumflex is totally occluded in the proximal segment.   RCA: The RCA is ectatic and tortuous in the  proximal and mid segments. The distal vessel is large caliber is normal. The rPDA and PLBs are medium caliber and normal.      4. CORONARY INTERVENTION FINDINGS:  PCI CORONARY SEGMENT: proximal circumflex  PRE-STENOSIS: 100%  POST-STENOSIS: 10%  LESION TYPE: C  LOUISA FLOW PRE/POST: 0/3  CULPRIT LESION: yes  DISSECTION:   no     SUMMARY: Successful intervention with a 3.0x33 and 3.0x15mm Xience Cindy REMA. There is also severe disease of the mid LAD at the bifurcation of the second diagonal. There is severe ectasia of the RCA and LMCA and proximal LAD.      RECOMMENDATIONS: DAPT indefinitely. Discontinuation of testosterone therapy. Aggressive risk factor modification including HTN and HLD. Echo today. Hydration. Monitor in CCU.      Echocardiogram on 12/26/18-  Interpretation Summary      · Calculated EF = 53.0%.  · Left ventricular systolic function is normal.  · Left ventricular wall thickness is consistent with hypertrophy. Sigmoid-shaped ventricular septum is present.  · Left ventricular diastolic dysfunction (grade I) consistent with impaired relaxation.  · Right atrial cavity size is mildly dilated.  · Right ventricular cavity is mildly dilated.  · There is calcification of the aortic valve. No stenosis  · No significant regurgitation observed     12/26/18 CTA of chest and abdomen:  IMPRESSION:  1.  Enlarged ascending aorta  without findings of a dissection  Flap.   *  Aortic root at the sinuses of Valsalva measures 4.1 cm.  *  Sinotubular junction measuring 3.4 cm.  *  Tubular ascending aorta measuring 4 cm in diameter.       2.  Enlarged main pulmonary artery suggestive of pulmonary artery  hypertension.  3.  Mild fat stranding surrounding the proximal aspect of the descending  colon with coexisting colonic diverticulosis suggestive of mild  diverticulitis. Correlation with patient history is recommended.  4.  Small hiatal hernia.    Past Medical History:   Diagnosis Date   • A-fib (CMS/HCC)    • Colon polyp     • Colorblind    • Coronary artery disease involving native coronary artery of native heart with angina pectoris (CMS/[facility]) 1/2/2019   • Ex-smoker    • Hyperlipidemia LDL goal <70 1/2/2019   • Hypertension    • Hypothyroidism    • ST elevation myocardial infarction (STEMI) (CMS/[facility]) 12/26/2018   • Testosterone deficiency    • Ventricular fibrillation (CMS/[facility])      Past Surgical History:   Procedure Laterality Date   • ADENOIDECTOMY     • BACK SURGERY     • CARDIAC ABLATION     • CARDIAC CATHETERIZATION N/A 12/26/2018    Procedure: Left Heart Cath;  Surgeon: Анна Booker MD;  Location:  [facility]ASIVE [facility];  Service: Cardiovascular   • CARDIAC CATHETERIZATION N/A 12/26/2018    Procedure: Coronary angiography;  Surgeon: Анна Booker MD;  Location:  [facility];  Service: Cardiovascular   • CARDIAC CATHETERIZATION N/A 12/26/2018    Procedure: Stent REMA coronary;  Surgeon: Анна Booker MD;  Location:  [facility]VAS[facility];  Service: Cardiovascular   • CARDIAC CATHETERIZATION  12/26/2018    Procedure: Percutaneous Mechanical Thrombectomy;  Surgeon: Анна Booker MD;  Location: [facility] CATH INVAS[facility] LOC[facility];  Service: Cardiovascular   • CARDIAC ELECTROPHYSIOLOGY STUDY AND ABLATION     • COLONOSCOPY     • COLONOSCOPY N/A 6/26/2017    Procedure: COLONOSCOPY AT BEDSIDE;  Surgeon: Fadi Gallagher MD;  Location: [facility] ENDOSCOPY;  Service:    • EP STUDY     • EXTERNAL EAR SURGERY     • HAND SURGERY     • INGUINAL HERNIA REPAIR     • MANDIBLE SURGERY     • NOSE SURGERY     • [place] RT/LT HEART CATHETERS N/A 12/26/2018    Procedure: Percutaneous Coronary Intervention;  Surgeon: Анна Booker MD;  Location:  [facility]AS[facility] LOC[facility];  Service: Cardiovascular   • SHOULDER SURGERY     • THYROIDECTOMY     • TONSILLECTOMY       Outpatient Medications Prior to Visit   Medication Sig Dispense Refill   • aspirin 81 MG chewable tablet Chew 1 tablet Daily.     • atorvastatin (LIPITOR) 80 MG  tablet Take 1 tablet by mouth Every Night. 30 tablet 11   • Cholecalciferol (VITAMIN D PO) Take 5,000 Int'l Units by mouth Daily. 2 tablets daily     • levothyroxine (SYNTHROID, LEVOTHROID) 150 MCG tablet Take 1 tablet by mouth Daily. 90 tablet 1   • metoprolol tartrate (LOPRESSOR) 25 MG tablet Take 1 tablet by mouth Every 12 (Twelve) Hours. 30 tablet 11   • prasugrel (EFFIENT) 10 MG tablet Take 1 tablet by mouth Daily. 30 tablet 11   • lisinopril (PRINIVIL,ZESTRIL) 10 MG tablet Take 1 tablet by mouth Daily. 30 tablet 11     No facility-administered medications prior to visit.        Allergies as of 2019 - Reviewed 2019   Allergen Reaction Noted   • Meperidine Anaphylaxis 10/21/2016   • Hydrocodone-acetaminophen Hallucinations 2018   • Oxycodone-acetaminophen Hallucinations 2018     Social History     Socioeconomic History   • Marital status:      Spouse name: Not on file   • Number of children: Not on file   • Years of education: Not on file   • Highest education level: Not on file   Social Needs   • Financial resource strain: Not on file   • Food insecurity - worry: Not on file   • Food insecurity - inability: Not on file   • Transportation needs - medical: Not on file   • Transportation needs - non-medical: Not on file   Occupational History   • Not on file   Tobacco Use   • Smoking status: Former Smoker     Types: Cigars     Last attempt to quit: 2010     Years since quittin.0   • Smokeless tobacco: Never Used   • Tobacco comment: Caffeine - coffee 3 daily,   Substance and Sexual Activity   • Alcohol use: Yes     Frequency: Never     Comment: occassional/less than 2oz per month    • Drug use: No   • Sexual activity: Yes     Partners: Female   Other Topics Concern   • Not on file   Social History Narrative   • Not on file     Family History   Problem Relation Age of Onset   • Arthritis Mother    • No Known Problems Father    • No Known Problems Brother    • No Known Problems  "Daughter      Review of Systems   Constitution: Negative for chills, fever and malaise/fatigue.   HENT: Negative for ear pain, hearing loss, nosebleeds and sore throat.    Eyes: Negative for double vision, pain, vision loss in left eye and vision loss in right eye.   Cardiovascular: Negative for chest pain, claudication, dyspnea on exertion, irregular heartbeat, leg swelling, near-syncope, orthopnea, palpitations, paroxysmal nocturnal dyspnea and syncope.   Respiratory: Negative for cough, shortness of breath, snoring and wheezing.    Endocrine: Negative for cold intolerance and heat intolerance.   Hematologic/Lymphatic: Negative for bleeding problem.   Skin: Negative for color change, itching, rash and unusual hair distribution.   Musculoskeletal: Negative for joint pain and joint swelling.   Gastrointestinal: Negative for abdominal pain, diarrhea, hematochezia, melena, nausea and vomiting.   Genitourinary: Negative for decreased libido, frequency, hematuria, hesitancy and incomplete emptying.   Neurological: Negative for excessive daytime sleepiness, dizziness, headaches, light-headedness, loss of balance, numbness, paresthesias and seizures.   Psychiatric/Behavioral: Negative for depression.          Objective:     Vitals:    01/04/19 1004   BP: 158/98   Pulse: 86   Resp: 20   Weight: 109 kg (240 lb)   Height: 188 cm (74\")     Body mass index is 30.81 kg/m².    PHYSICAL EXAM:  Physical Exam   Constitutional: He is oriented to person, place, and time. He appears well-developed and well-nourished. No distress.   Overweight   HENT:   Head: Normocephalic and atraumatic.   Eyes: Conjunctivae and EOM are normal. Pupils are equal, round, and reactive to light.   Neck: Trachea normal. Neck supple. No JVD present. Carotid bruit is not present.   Cardiovascular: Normal rate, regular rhythm, normal heart sounds and intact distal pulses.   No murmur heard.  Pulses:       Radial pulses are 2+ on the right side, and 2+ on the " left side.        Posterior tibial pulses are 2+ on the right side, and 2+ on the left side.   Right radial cath site well healed without erythema , there is mild bruising of medial forearm, palpable proximal and distal pulses, good capillary refill, good motor function of hand, no thrill or bruit detected, site is soft and non-tender with no hematoma, no sensory deficits noted.     Pulmonary/Chest: Effort normal and breath sounds normal. No accessory muscle usage. No tachypnea. No respiratory distress. He has no decreased breath sounds. He has no wheezes. He has no rhonchi. He has no rales. He exhibits no tenderness.   Abdominal: Soft. Bowel sounds are normal. He exhibits no distension. There is no tenderness. There is no rebound.   Overweight, compressible.    Musculoskeletal: Normal range of motion. He exhibits no edema.   Neurological: He is alert and oriented to person, place, and time.   Skin: Skin is warm, dry and intact. Bruising noted. He is not diaphoretic. No erythema.        Psychiatric: He has a normal mood and affect. His speech is normal and behavior is normal. Judgment and thought content normal. Cognition and memory are normal.   Nursing note and vitals reviewed.        ECG 12 Lead  Date/Time: 1/4/2019 10:09 AM  Performed by: Louisa Navas APRN  Authorized by: Louisa Navas APRN   Comparison: compared with previous ECG from 12/26/2018  Similar to previous ECG  Comparison to previous ECG: Reviewed current and prior ECG with Dr. Booker  Rhythm: sinus rhythm  Rate: normal  BPM: 86  T flattening: III  QRS axis: left  Clinical impression: abnormal ECG  Comments: Probably Right ventricular hypertrophy  Indication: CAD, STEMI s/p REMA            Assessment:       Diagnosis Plan   1. ST elevation myocardial infarction involving left circumflex coronary artery (CMS/HCC)  Ambulatory Referral to Cardiac Rehab    ECG 12 Lead   2. Coronary artery disease involving native coronary artery of native heart with  angina pectoris (CMS/[facility])  Ambulatory Referral to Cardiac Rehab    ECG 12 Lead   3. S/P drug eluting coronary stent placement  Ambulatory Referral to Cardiac Rehab    ECG 12 Lead   4. Essential hypertension  Ambulatory Referral to Cardiac Rehab    ECG 12 Lead   5. Ascending aorta enlargement (CMS/[facility])  Ambulatory Referral to Cardiac Rehab    ECG 12 Lead   6. Hyperlipidemia LDL goal <70  Ambulatory Referral to Cardiac Rehab    ECG 12 Lead       Plan:     Coronary Artery Disease  Assessment  • The patient has no angina  • There is a new diagnosis of stable angina in the past 12 months    Plan  • Lifestyle modifications discussed include adhering to a heart healthy diet, avoidance of tobacco products, maintenance of a healthy weight, medication compliance, regular exercise and regular monitoring of cholesterol and blood pressure    Subjective - Objective  • There is a history of past MI on or around 12/26/2018  • There has been a previous stent procedure using REMA on or around 12/26/2018  • Current antiplatelet therapy includes aspirin 81 mg and prasugrel 10 mg  • The patient qualifies for cardiac rehabilitation, and has been referred to cardiac rehab        Dual antiplatelet therapy recommended indefinitely.  Patient still has a severe mid LAD lesion to be addressed as an outpatient. Dr. Booker will discuss plan for that at next visit.     2. Hypertension: Started on Metoprolol tartrate 25 mg twice a day and lisinopril 10 mg daily.   Blood pressure is still little high.  Running 140/90-1 10 at home.  He is asymptomatic with this.  I will increase his lisinopril to 20 mg daily.    3. Enlarged ascending aorta- detected on CT angiogram of chest/abdomen 12/26/18.  No evidence of dissection.  Focus on good blood pressure control.    4. Hyperlipidemia: started on Atorvastatin 80 mg daily. Needs recheck fasting lipid panel and hepatic function panel in 3 months per PCP.     5. Right ventricular dilation: Suggestive findings  of pulm HTN on CTA d/t prominent main pulmonary artery.  Patient  has no shortness of breath or orthopnea.  We did discuss doing a possible sleep apnea work up in the future. He wants to hold off for now until after his next cath procedure.         Follow up with Dr. Booker on 1/28/19, unless otherwise needed sooner.  I advised the patient to contact our office with any questions or concerns.         Your medication list           Accurate as of 1/4/19 11:08 AM. If you have any questions, ask your nurse or doctor.               CHANGE how you take these medications      Instructions Last Dose Given Next Dose Due   lisinopril 20 MG tablet  Commonly known as:  PRINIVIL,ZESTRIL  What changed:    · medication strength  · how much to take  Changed by:  TASHA Mendez      Take 1 tablet by mouth Daily.          CONTINUE taking these medications      Instructions Last Dose Given Next Dose Due   aspirin 81 MG chewable tablet      Chew 1 tablet Daily.       atorvastatin 80 MG tablet  Commonly known as:  LIPITOR      Take 1 tablet by mouth Every Night.       levothyroxine 150 MCG tablet  Commonly known as:  SYNTHROID, LEVOTHROID      Take 1 tablet by mouth Daily.       metoprolol tartrate 25 MG tablet  Commonly known as:  LOPRESSOR      Take 1 tablet by mouth Every 12 (Twelve) Hours.       prasugrel 10 MG tablet  Commonly known as:  EFFIENT      Take 1 tablet by mouth Daily.       VITAMIN D PO      Take 5,000 Int'l Units by mouth Daily. 2 tablets daily             Where to Get Your Medications      These medications were sent to [facility] [address] [place] [address] - [address] [facility] - [phone]  - [phone]  [address] [facility] [address]    Phone:  [phone]   · lisinopril 20 MG tablet         The above medication changes may not have been made by this provider.  Medication list was updated to reflect medications patient is currently taking including medication changes and discontinuations  made by other healthcare providers.       I have reviewed this case with Dr. Booker. It has been a pleasure to participate in this patient's care. Please feel free to contact me with any questions or concerns.     Louisa Navas, APRN  01/04/2019       Dictated utilizing Dragon Dictation System.

## 2019-01-02 NOTE — OUTREACH NOTE
AMI Week 1 Survey      Responses   Facility patient discharged from?  [facility]   Does the patient have one of the following disease processes/diagnoses(primary or secondary)?  Acute MI (STEMI,NSTEMI)   Is there a successful TCM telephone encounter documented?  No   Week 1 attempt successful?  Yes   Call start time  0930   Call end time  0933   General alerts for this patient  Pt is a Medic.   Discharge diagnosis  STEMI, heart cath and stent   Meds reviewed with patient/caregiver?  Yes   Is the patient having any side effects they believe may be caused by any medication additions or changes?  No   Does the patient have all prescriptions related to this admission filled (includes statins,anticoagulants,HTN meds,anti-arrhythmia meds)  Yes   Is the patient taking all medications as directed (includes completed medication regime)?  Yes   Does the patient have a primary care provider?   Yes   Does the patient have an appointment with their PCP,cardiologist,or clinic within 7 days of discharge?  Yes   Has the patient kept scheduled appointments due by today?  N/A   Has home health visited the patient within 72 hours of discharge?  N/A   Psychosocial issues?  No   Did the patient receive a copy of their discharge instructions?  Yes   Nursing interventions  Reviewed instructions with patient   What is the patient's perception of their health status since discharge?  Improving   Nursing interventions  Nurse provided patient education   Is the patient/caregiver able to teach back signs and symptoms of when to call for help immediately:  Sudden chest discomfort, Sudden discomfort in arms, back, neck or jaw   Nursing interventions  Nurse provided patient education   Is the pateint /caregiver able to teach back the importance of cardiac rehab?  Yes   Nursing interventions  Provided education on importance of cardiac rehab   Is the patient/caregiver able to teach back lifestyle changes to help prevent MIs  Regular exercise as  approved by provider, Heart healthy diet, Maintaining a healthy weight, Managing diabetes   Is the patient/caregiver able to teach back ways to prevent a second heart attack:  Follow up with MD, Take medications, Manage risk factors   Is the patient/caregiver able to teach back the hierarchy of who to call/visit for symptoms/problems? PCP, Specialist, Home health nurse, Urgent Care, ED, 911  Yes   Revoked  No further contact(revokes)-requires comment   Graduated/Revoked comments  Well educated. He is a Medic. Back to work.          Ruel Rivero RN

## 2019-01-04 ENCOUNTER — OFFICE VISIT (OUTPATIENT)
Dept: CARDIOLOGY | Facility: CLINIC | Age: 59
End: 2019-01-04

## 2019-01-04 VITALS
DIASTOLIC BLOOD PRESSURE: 98 MMHG | BODY MASS INDEX: 30.8 KG/M2 | HEART RATE: 86 BPM | HEIGHT: 74 IN | SYSTOLIC BLOOD PRESSURE: 158 MMHG | RESPIRATION RATE: 20 BRPM | WEIGHT: 240 LBS

## 2019-01-04 DIAGNOSIS — I77.89 ASCENDING AORTA ENLARGEMENT (HCC): ICD-10-CM

## 2019-01-04 DIAGNOSIS — I25.119 CORONARY ARTERY DISEASE INVOLVING NATIVE CORONARY ARTERY OF NATIVE HEART WITH ANGINA PECTORIS (HCC): ICD-10-CM

## 2019-01-04 DIAGNOSIS — Z95.5 S/P DRUG ELUTING CORONARY STENT PLACEMENT: ICD-10-CM

## 2019-01-04 DIAGNOSIS — I10 ESSENTIAL HYPERTENSION: ICD-10-CM

## 2019-01-04 DIAGNOSIS — E78.5 HYPERLIPIDEMIA LDL GOAL <70: ICD-10-CM

## 2019-01-04 DIAGNOSIS — I21.21 ST ELEVATION MYOCARDIAL INFARCTION INVOLVING LEFT CIRCUMFLEX CORONARY ARTERY (HCC): Primary | ICD-10-CM

## 2019-01-04 PROCEDURE — 93000 ELECTROCARDIOGRAM COMPLETE: CPT | Performed by: NURSE PRACTITIONER

## 2019-01-04 PROCEDURE — 99214 OFFICE O/P EST MOD 30 MIN: CPT | Performed by: NURSE PRACTITIONER

## 2019-01-04 RX ORDER — LISINOPRIL 20 MG/1
20 TABLET ORAL DAILY
Qty: 30 TABLET | Refills: 0 | Status: SHIPPED | OUTPATIENT
Start: 2019-01-04 | End: 2019-01-23 | Stop reason: SDUPTHER

## 2019-01-15 ENCOUNTER — OFFICE VISIT (OUTPATIENT)
Dept: ENDOCRINOLOGY | Age: 59
End: 2019-01-15

## 2019-01-15 VITALS
DIASTOLIC BLOOD PRESSURE: 94 MMHG | BODY MASS INDEX: 30.65 KG/M2 | HEART RATE: 68 BPM | WEIGHT: 238.8 LBS | HEIGHT: 74 IN | SYSTOLIC BLOOD PRESSURE: 138 MMHG

## 2019-01-15 DIAGNOSIS — E78.5 HYPERLIPIDEMIA LDL GOAL <70: ICD-10-CM

## 2019-01-15 DIAGNOSIS — E29.1 HYPOGONADISM IN MALE: ICD-10-CM

## 2019-01-15 DIAGNOSIS — R63.5 ABNORMAL WEIGHT GAIN: ICD-10-CM

## 2019-01-15 DIAGNOSIS — E89.0 POSTABLATIVE HYPOTHYROIDISM: Primary | ICD-10-CM

## 2019-01-15 PROCEDURE — 99214 OFFICE O/P EST MOD 30 MIN: CPT | Performed by: INTERNAL MEDICINE

## 2019-01-15 NOTE — PROGRESS NOTES
58 y.o.    Patient Care Team:  Clarissa Krueger APRN as PCP - General (Family Medicine)  Sandra Merlos MD (Inactive) as PCP - Family Medicine    Chief Complaint:      HOSPITAL F/U FOR HYPOTHYROIDISM.  HYPOGONADISM MALE.  Subjective     HPI patient is a 58-year-old white male with a past history of hypothyroidism postoperative, hypogonadism, creatinine disease came for follow-up    Postoperative hypothyroidism  Patient had total thyroidectomy 2900 and has been on replacement ever since.  Since the hospitalization.  She was sent home on levothyroxine 150 µg daily.  He reports compliance with the medication  Denies any side effects  He is feeling stable on this dose  Male hypogonadism  Patient was on testosterone products as well as DHEA from the [facility]  Since admission and discharge from the hospital he was advised to discontinue all medication  Patient reports that he has not had any problems stopping this medication  He still feels reasonably energetic and is able to exercise without this medication  Abnormal weight gain  Patient actually managed to lose about 7 pounds since discharge from the hospital and he intends to continue further weight loss      Interval History    Patient is a 58-year-old white male admitted to the hospital with chest pain and was evaluated by cardiology and he underwent cardiac catheterization with stent placement for STEMI  Patient has history of atrial fibrillation as well as ventricular fibrillation in the past     Patient reported that he had thyroidectomy 2009 and has been on levothyroxine until 2 or 3 years ago when he started going to [facility] an establishment that prescribes a combination of T3 plus T4, DHEA, testosterone products for health benefits     Patient reported that he was given testosterone injections twice weekly and he recalls his levels are usually greater than 1000  He was also advised to change his levothyroxine   He  was placed on a combination medication of T3 plus T4 40 µg/114 µg daily dose  Patient reported compliance with the medication     Since the heart attack he started rethinking his options and would like to be placed back on levothyroxine.  He also realizes that he may not be able to take testosterone any longer since several doctors right from the emergency room on words advised him to discontinue        The following portions of the patient's history were reviewed and updated as appropriate: allergies, current medications, past family history, past medical history, past social history, past surgical history and problem list.    Past Medical History:   Diagnosis Date   • A-fib (CMS/HCC)    • Colon polyp    • Colorblind    • Coronary artery disease involving native coronary artery of native heart with angina pectoris (CMS/HCC) 2019   • Ex-smoker    • Hyperlipidemia LDL goal <70 2019   • Hypertension    • Hypothyroidism    • ST elevation myocardial infarction (STEMI) (CMS/HCC) 2018   • Testosterone deficiency    • Ventricular fibrillation (CMS/HCC)      Family History   Problem Relation Age of Onset   • Arthritis Mother    • No Known Problems Father    • No Known Problems Brother    • No Known Problems Daughter      Social History     Socioeconomic History   • Marital status:      Spouse name: Not on file   • Number of children: Not on file   • Years of education: Not on file   • Highest education level: Not on file   Social Needs   • Financial resource strain: Not on file   • Food insecurity - worry: Not on file   • Food insecurity - inability: Not on file   • Transportation needs - medical: Not on file   • Transportation needs - non-medical: Not on file   Occupational History   • Not on file   Tobacco Use   • Smoking status: Former Smoker     Types: Cigars     Last attempt to quit: 2010     Years since quittin.0   • Smokeless tobacco: Never Used   • Tobacco comment: Caffeine - coffee 3 daily,  "  Substance and Sexual Activity   • Alcohol use: Yes     Frequency: Never     Comment: occassional/less than 2oz per month    • Drug use: No   • Sexual activity: Yes     Partners: Female   Other Topics Concern   • Not on file   Social History Narrative   • Not on file     Allergies   Allergen Reactions   • Meperidine Anaphylaxis     Per patient's wife, he stopped breathing   • Hydrocodone-Acetaminophen Hallucinations   • Oxycodone-Acetaminophen Hallucinations       Current Outpatient Medications:   •  aspirin 81 MG chewable tablet, Chew 1 tablet Daily., Disp: , Rfl:   •  atorvastatin (LIPITOR) 80 MG tablet, Take 1 tablet by mouth Every Night., Disp: 30 tablet, Rfl: 11  •  Cholecalciferol (VITAMIN D PO), Take 5,000 Int'l Units by mouth Daily. 2 tablets daily, Disp: , Rfl:   •  levothyroxine (SYNTHROID, LEVOTHROID) 150 MCG tablet, Take 1 tablet by mouth Daily., Disp: 90 tablet, Rfl: 1  •  lisinopril (PRINIVIL,ZESTRIL) 20 MG tablet, Take 1 tablet by mouth Daily., Disp: 30 tablet, Rfl: 0  •  metoprolol tartrate (LOPRESSOR) 25 MG tablet, Take 1 tablet by mouth Every 12 (Twelve) Hours., Disp: 30 tablet, Rfl: 11  •  prasugrel (EFFIENT) 10 MG tablet, Take 1 tablet by mouth Daily., Disp: 30 tablet, Rfl: 11        Review of Systems   Constitutional: Negative for chills, fatigue and fever.   Cardiovascular: Negative for chest pain and palpitations.   Gastrointestinal: Negative for abdominal pain, constipation, diarrhea, nausea and vomiting.   Endocrine: Negative for cold intolerance and heat intolerance.   All other systems reviewed and are negative.      Objective       Vitals:    01/15/19 0932   BP: 138/94   Pulse: 68   Weight: 108 kg (238 lb 12.8 oz)   Height: 188 cm (74\")     Body mass index is 30.66 kg/m².      Physical Exam   Constitutional: He is oriented to person, place, and time.   HENT:   Head: Normocephalic and atraumatic.   Eyes: EOM are normal. Pupils are equal, round, and reactive to light.   Neck: Normal range " of motion. Neck supple. No thyromegaly present.   Cardiovascular: Normal rate, regular rhythm, normal heart sounds and intact distal pulses.   Pulmonary/Chest: Effort normal and breath sounds normal.   Abdominal: Soft. Bowel sounds are normal.   Musculoskeletal: Normal range of motion. He exhibits no edema.   Neurological: He is alert and oriented to person, place, and time.   Skin: Skin is warm and dry.   Psychiatric: He has a normal mood and affect. His behavior is normal.   Nursing note and vitals reviewed.    Results Review:     I reviewed the patient's new clinical results.    Medical records reviewed  Summary:      Admission on 12/26/2018, Discharged on 12/28/2018   Component Date Value Ref Range Status   • Glucose 12/26/2018 100* 65 - 99 mg/dL Final   • BUN 12/26/2018 12  6 - 20 mg/dL Final   • Creatinine 12/26/2018 1.15  0.76 - 1.27 mg/dL Final   • Sodium 12/26/2018 141  136 - 145 mmol/L Final   • Potassium 12/26/2018 4.2  3.5 - 5.2 mmol/L Final   • Chloride 12/26/2018 103  98 - 107 mmol/L Final   • CO2 12/26/2018 25.0  22.0 - 29.0 mmol/L Final   • Calcium 12/26/2018 8.8  8.6 - 10.5 mg/dL Final   • Total Protein 12/26/2018 6.9  6.0 - 8.5 g/dL Final   • Albumin 12/26/2018 4.40  3.50 - 5.20 g/dL Final   • ALT (SGPT) 12/26/2018 37  1 - 41 U/L Final   • AST (SGOT) 12/26/2018 27  1 - 40 U/L Final   • Alkaline Phosphatase 12/26/2018 82  39 - 117 U/L Final   • Total Bilirubin 12/26/2018 0.3  0.1 - 1.2 mg/dL Final   • eGFR Non African Amer 12/26/2018 66  >60 mL/min/1.73 Final   • Globulin 12/26/2018 2.5  gm/dL Final   • A/G Ratio 12/26/2018 1.8  g/dL Final   • BUN/Creatinine Ratio 12/26/2018 10.4  7.0 - 25.0 Final   • Anion Gap 12/26/2018 13.0  mmol/L Final   • Troponin T 12/26/2018 0.014  0.000 - 0.030 ng/mL Final   • Lipase 12/26/2018 30  13 - 60 U/L Final   • WBC 12/26/2018 9.29  4.50 - 10.70 10*3/mm3 Final   • RBC 12/26/2018 5.79  4.60 - 6.00 10*6/mm3 Final   • Hemoglobin 12/26/2018 18.6* 13.7 - 17.6 g/dL Final    • Hematocrit 12/26/2018 52.9* 40.4 - 52.2 % Final   • MCV 12/26/2018 91.4  79.8 - 96.2 fL Final   • MCH 12/26/2018 32.1  27.0 - 32.7 pg Final   • MCHC 12/26/2018 35.2  32.6 - 36.4 g/dL Final   • RDW 12/26/2018 13.7  11.5 - 14.5 % Final   • RDW-SD 12/26/2018 45.1  37.0 - 54.0 fl Final   • MPV 12/26/2018 9.4  6.0 - 12.0 fL Final   • Platelets 12/26/2018 218  140 - 500 10*3/mm3 Final   • Neutrophil % 12/26/2018 70.4  42.7 - 76.0 % Final   • Lymphocyte % 12/26/2018 20.8  19.6 - 45.3 % Final   • Monocyte % 12/26/2018 7.6  5.0 - 12.0 % Final   • Eosinophil % 12/26/2018 0.8  0.3 - 6.2 % Final   • Basophil % 12/26/2018 0.4  0.0 - 1.5 % Final   • Immature Grans % 12/26/2018 0.6* 0.0 - 0.5 % Final   • Neutrophils, Absolute 12/26/2018 6.54  1.90 - 8.10 10*3/mm3 Final   • Lymphocytes, Absolute 12/26/2018 1.93  0.90 - 4.80 10*3/mm3 Final   • Monocytes, Absolute 12/26/2018 0.71  0.20 - 1.20 10*3/mm3 Final   • Eosinophils, Absolute 12/26/2018 0.07  0.00 - 0.70 10*3/mm3 Final   • Basophils, Absolute 12/26/2018 0.04  0.00 - 0.20 10*3/mm3 Final   • Immature Grans, Absolute 12/26/2018 0.06* 0.00 - 0.03 10*3/mm3 Final   • Extra Tube 12/26/2018 hold for add-on   Final    Auto resulted   • Extra Tube 12/26/2018 Hold for add-ons.   Final    Auto resulted.   • BSA 12/26/2018 2.4  m^2 Final   • IVSd 12/26/2018 1.8  cm Final   • LVIDd 12/26/2018 5.3  cm Final   • LVIDs 12/26/2018 3.9  cm Final   • LVPWd 12/26/2018 1.3  cm Final   • IVS/LVPW 12/26/2018 1.4   Final   • FS 12/26/2018 26.4  % Final   • EDV(Teich) 12/26/2018 135.3  ml Final   • ESV(Teich) 12/26/2018 65.9  ml Final   • EF(Teich) 12/26/2018 51.3  % Final   • EDV(cubed) 12/26/2018 148.9  ml Final   • ESV(cubed) 12/26/2018 59.3  ml Final   • EF(cubed) 12/26/2018 60.2  % Final   • LV mass(C)d 12/26/2018 369.9  grams Final   • LV mass(C)dI 12/26/2018 157.0  grams/m^2 Final   • SV(Teich) 12/26/2018 69.4  ml Final   • SI(Teich) 12/26/2018 29.5  ml/m^2 Final   • SV(cubed) 12/26/2018  89.6  ml Final   • SI(cubed) 12/26/2018 38.0  ml/m^2 Final   • Ao root diam 12/26/2018 3.6  cm Final   • Ao root area 12/26/2018 10.2  cm^2 Final   • ACS 12/26/2018 2.3  cm Final   • LVOT diam 12/26/2018 2.4  cm Final   • LVOT area 12/26/2018 4.5  cm^2 Final   • LVOT area(traced) 12/26/2018 4.5  cm^2 Final   • RVOT diam 12/26/2018 3.0  cm Final   • RVOT area 12/26/2018 7.1  cm^2 Final   • LVLd ap4 12/26/2018 8.3  cm Final   • EDV(MOD-sp4) 12/26/2018 107.0  ml Final   • LVLs ap4 12/26/2018 8.1  cm Final   • ESV(MOD-sp4) 12/26/2018 47.0  ml Final   • EF(MOD-sp4) 12/26/2018 56.1  % Final   • LVLd ap2 12/26/2018 8.6  cm Final   • EDV(MOD-sp2) 12/26/2018 115.0  ml Final   • LVLs ap2 12/26/2018 7.2  cm Final   • ESV(MOD-sp2) 12/26/2018 57.0  ml Final   • EF(MOD-sp2) 12/26/2018 50.4  % Final   • SV(MOD-sp4) 12/26/2018 60.0  ml Final   • SI(MOD-sp4) 12/26/2018 25.5  ml/m^2 Final   • SV(MOD-sp2) 12/26/2018 58.0  ml Final   • SI(MOD-sp2) 12/26/2018 24.6  ml/m^2 Final   • Ao root area (BSA corrected) 12/26/2018 1.5   Final   • LV Chew Vol (BSA corrected) 12/26/2018 45.4  ml/m^2 Final   • LV Sys Vol (BSA corrected) 12/26/2018 19.9  ml/m^2 Final   • MV A dur 12/26/2018 0.09  sec Final   • MV E max abdiel 12/26/2018 58.7  cm/sec Final   • MV A max abdiel 12/26/2018 93.1  cm/sec Final   • MV E/A 12/26/2018 0.63   Final   • MV V2 max 12/26/2018 87.5  cm/sec Final   • MV max PG 12/26/2018 3.1  mmHg Final   • MV V2 mean 12/26/2018 50.4  cm/sec Final   • MV mean PG 12/26/2018 1.0  mmHg Final   • MV V2 VTI 12/26/2018 23.2  cm Final   • MVA(VTI) 12/26/2018 3.9  cm^2 Final   • MV P1/2t max abdiel 12/26/2018 71.9  cm/sec Final   • MV P1/2t 12/26/2018 89.6  msec Final   • MVA(P1/2t) 12/26/2018 2.5  cm^2 Final   • MV dec slope 12/26/2018 235.0  cm/sec^2 Final   • MV dec time 12/26/2018 0.16  sec Final   • Ao pk abdiel 12/26/2018 139.0  cm/sec Final   • Ao max PG 12/26/2018 7.7  mmHg Final   • Ao max PG (full) 12/26/2018 3.5  mmHg Final   • Ao V2 mean  12/26/2018 86.0  cm/sec Final   • Ao mean PG 12/26/2018 4.0  mmHg Final   • Ao mean PG (full) 12/26/2018 2.0  mmHg Final   • Ao V2 VTI 12/26/2018 23.5  cm Final   • SANDEEP(I,A) 12/26/2018 3.9  cm^2 Final   • SANDEEP(I,D) 12/26/2018 3.9  cm^2 Final   • SANDEEP(V,A) 12/26/2018 3.4  cm^2 Final   • SANDEEP(V,D) 12/26/2018 3.4  cm^2 Final   • LV V1 max PG 12/26/2018 4.2  mmHg Final   • LV V1 mean PG 12/26/2018 2.0  mmHg Final   • LV V1 max 12/26/2018 103.0  cm/sec Final   • LV V1 mean 12/26/2018 65.4  cm/sec Final   • LV V1 VTI 12/26/2018 20.0  cm Final   • SV(Ao) 12/26/2018 239.2  ml Final   • SI(Ao) 12/26/2018 101.5  ml/m^2 Final   • SV(LVOT) 12/26/2018 90.5  ml Final   • SV(RVOT) 12/26/2018 101.1  ml Final   • SI(LVOT) 12/26/2018 38.4  ml/m^2 Final   • PA V2 max 12/26/2018 77.4  cm/sec Final   • PA max PG 12/26/2018 2.4  mmHg Final   • PA max PG (full) 12/26/2018 0.85  mmHg Final   • BH CV ECHO DAGOBERTO - PVA(V,A) 12/26/2018 5.7  cm^2 Final   • BH CV ECHO DAGOBERTO - PVA(V,D) 12/26/2018 5.7  cm^2 Final   • PA acc time 12/26/2018 0.12  sec Final   • RV V1 max PG 12/26/2018 1.5  mmHg Final   • RV V1 mean PG 12/26/2018 1.0  mmHg Final   • RV V1 max 12/26/2018 62.1  cm/sec Final   • RV V1 mean 12/26/2018 41.6  cm/sec Final   • RV V1 VTI 12/26/2018 14.3  cm Final   • PA pr(Accel) 12/26/2018 23.7  mmHg Final   • Pulm Sys Valente 12/26/2018 39.0  cm/sec Final   • Pulm Chew Valente 12/26/2018 22.6  cm/sec Final   • Pulm S/D 12/26/2018 1.7   Final   • Qp/Qs 12/26/2018 1.1   Final   • Pulm A Revs Dur 12/26/2018 0.11  sec Final   • Pulm A Revs Valente 12/26/2018 29.3  cm/sec Final   • MVA P1/2T LCG 12/26/2018 3.1  cm^2 Final   • BH CV ECHO DAGOBERTO - BZI_BMI 12/26/2018 31.1  kilograms/m^2 Final   • BH CV ECHO DAGOBERTO - BSA([facility]) 12/26/2018 2.4  m^2 Final   • BH CV ECHO DAGOBERTO - BZI_METRIC_WEIGHT 12/26/2018 109.8  kg Final   • BH CV ECHO DAGOBERTO - BZI_METRIC_HEIGHT 12/26/2018 188.0  cm Final   • Target HR (85%) 12/26/2018 139  bpm Final   • Max. Pred. HR (100%) 12/26/2018  163  bpm Final   • BH CV VAS BP RIGHT ARM 12/26/2018 110/82  mmHg Final   • TDI S' 12/26/2018 17.00  cm/sec Final   • RV Base 12/26/2018 5.20  cm Final   • LA Volume Index 12/26/2018 29.0  mL/m2 Final   • Avg E/e' ratio 12/26/2018 13.04   Final   • EF(MOD-bp) 12/26/2018 53.0  % Final   • Lat Peak E' Valente 12/26/2018 4.0  cm/sec Final   • Med Peak E' Valente 12/26/2018 5.00  cm/sec Final   • TAPSE (>1.6) 12/26/2018 2.30  cm2 Final   • Glucose 12/26/2018 98  70 - 130 mg/dL Final   • Glucose 12/26/2018 95  70 - 130 mg/dL Final   • WBC 12/27/2018 10.92* 4.50 - 10.70 10*3/mm3 Final   • RBC 12/27/2018 4.95  4.60 - 6.00 10*6/mm3 Final   • Hemoglobin 12/27/2018 15.7  13.7 - 17.6 g/dL Final   • Hematocrit 12/27/2018 48.0  40.4 - 52.2 % Final   • MCV 12/27/2018 97.0* 79.8 - 96.2 fL Final   • MCH 12/27/2018 31.7  27.0 - 32.7 pg Final   • MCHC 12/27/2018 32.7  32.6 - 36.4 g/dL Final   • RDW 12/27/2018 14.2  11.5 - 14.5 % Final   • RDW-SD 12/27/2018 50.5  37.0 - 54.0 fl Final   • MPV 12/27/2018 9.2  6.0 - 12.0 fL Final   • Platelets 12/27/2018 194  140 - 500 10*3/mm3 Final   • Glucose 12/27/2018 77  65 - 99 mg/dL Final   • BUN 12/27/2018 12  6 - 20 mg/dL Final   • Creatinine 12/27/2018 0.96  0.76 - 1.27 mg/dL Final   • Sodium 12/27/2018 139  136 - 145 mmol/L Final   • Potassium 12/27/2018 4.2  3.5 - 5.2 mmol/L Final   • Chloride 12/27/2018 108* 98 - 107 mmol/L Final   • CO2 12/27/2018 22.2  22.0 - 29.0 mmol/L Final   • Calcium 12/27/2018 8.2* 8.6 - 10.5 mg/dL Final   • eGFR Non  Amer 12/27/2018 81  >60 mL/min/1.73 Final   • BUN/Creatinine Ratio 12/27/2018 12.5  7.0 - 25.0 Final   • Anion Gap 12/27/2018 8.8  mmol/L Final   • Hemoglobin A1C 12/27/2018 4.80  4.80 - 5.60 % Final   • Total Cholesterol 12/27/2018 116  0 - 200 mg/dL Final   • Triglycerides 12/27/2018 89  0 - 150 mg/dL Final   • HDL Cholesterol 12/27/2018 32* 40 - 60 mg/dL Final   • LDL Cholesterol  12/27/2018 66  0 - 100 mg/dL Final   • VLDL Cholesterol 12/27/2018  17.8  5 - 40 mg/dL Final   • LDL/HDL Ratio 12/27/2018 2.07   Final   • Glucose 12/27/2018 84  70 - 130 mg/dL Final   • Activated Clotting Time  12/26/2018 279* 82 - 152 Seconds Final    Serial Number: [id number]:  [id number]   • Activated Clotting Time  12/26/2018 235* 82 - 152 Seconds Final    Serial Number: [id number]:  [id number]   • Activated Clotting Time  12/26/2018 241* 82 - 152 Seconds Final    Serial Number: [id number]:  [id number]   • TSH 12/27/2018 12.930* 0.270 - 4.200 mIU/mL Final   • Free T4 12/27/2018 0.56* 0.93 - 1.70 ng/dL Final   • T3, Free 12/27/2018 2.36  2.00 - 4.40 pg/mL Final   • TSH 12/28/2018 14.560* 0.270 - 4.200 mIU/mL Final     Lab Results   Component Value Date    HGBA1C 4.80 12/27/2018     Lab Results   Component Value Date    CREATININE 0.96 12/27/2018     Imaging Results (most recent)     None                Assessment and Plan:    Garrett was seen today for hypothyroidism and hypogonadism.    Diagnoses and all orders for this visit:    Postablative hypothyroidism  -     T4, Free; Future  -     TSH; Future    Hypogonadism in male    Hyperlipidemia LDL goal <70    Abnormal weight gain    Patient reports that he is feeling significantly better with the thyroid dose of 150 µg daily.  He had been compliant with the medication  He is been taking it on empty stomach in the morning    Hypogonadism  Patient stopped testosterone products since discharge  He is still feeling reasonably better  Denies any fatigue or sweating episodes and is comfortable having stopped the testosterone    Abnormal weight gain  Patient currently weighs 238 pounds with a BMI of 30.6    I advised the patient to get lab testing done in 3-4 weeks  Patient will return to follow-up in 3-4 months    The total time spent  was more than 25 min of which greater than 15 min of time (greater than 50% of the total time)  was spent face to face with the patient counseling and coordination of care on recommended evaluation and  "treatment options, instructions for management/treatment and /or follow up and importance of compliance with chosen management or treatment options  Chato Gutierrez MD. FACE    01/15/19      EM[facility] Dragon / transcription disclaimer:     \"Dictated utilizing Dragon dictation\".         "

## 2019-01-24 RX ORDER — LISINOPRIL 20 MG/1
TABLET ORAL
Qty: 30 TABLET | Refills: 3 | Status: ON HOLD | OUTPATIENT
Start: 2019-01-24 | End: 2019-02-13

## 2019-01-28 ENCOUNTER — OFFICE VISIT (OUTPATIENT)
Dept: CARDIOLOGY | Facility: CLINIC | Age: 59
End: 2019-01-28

## 2019-01-28 VITALS
DIASTOLIC BLOOD PRESSURE: 80 MMHG | WEIGHT: 236 LBS | HEIGHT: 74 IN | SYSTOLIC BLOOD PRESSURE: 122 MMHG | BODY MASS INDEX: 30.29 KG/M2 | HEART RATE: 72 BPM

## 2019-01-28 DIAGNOSIS — I25.118 CORONARY ARTERY DISEASE OF NATIVE ARTERY OF NATIVE HEART WITH STABLE ANGINA PECTORIS (HCC): Primary | ICD-10-CM

## 2019-01-28 PROCEDURE — 99214 OFFICE O/P EST MOD 30 MIN: CPT | Performed by: INTERNAL MEDICINE

## 2019-01-28 NOTE — PROGRESS NOTES
Subjective:     Encounter Date:01/28/2019      Patient ID: Garrett Gomez Jr. is a 58 y.o. male.    Chief Complaint:  This is a 58 year old male with history of HTN, HLD, & hypothyroidism who came into the hospital 2 days ago for chest pain and hypertension. His chest pain radiated into both arms and his upper abdomen. EKG ruled him in for STEMI and he underwent cardiac catheterization which found occluded circumflex s/p REMA. He also had severe mid LAD stenosis- but this will be addressed an an outpatient. He was started on a regimen of ASA, Effient,Lipitor, Lisinopril, and Metoprolol. An echocardiogram was done which found normal LV function with EF of 53%.      Returns today and complains of exertional dyspnea.  He is not smoking, he is not drinking.  He has returned to exercising.  He is a  and has returned to work.  He plans on taking a motorcycle trip soon.       Cardiac Catheterization on 12/26/18-  1. HEMODYNAMICS:  /50, /80.     2. LEFT VENTRICULOGRAPHY:  LVEDP 10mmHg     3. CORONARY ANGIOGRAPHY:   Left main: Ectatic, very large let main. Bifurcates in to the LAD and circumflex.   LAD: The LAD is large caliber in the proximal vessel and tapers to medium caliber in mid and distal segments. There is at least moderate disease in the proximal vessel. The first diagonal is medium caliber and has mild ostial stenosis. The mid LAD has a 70-80% stenosis just at the bifurcation of the 2nd diagonal (1,0,0 lesion). The second diagonal has a mild to moderate mid-vessel stenosis. The distal LAD is medium caliber and is essentially normal and wraps the apex.  Left circumflex: The circumflex is totally occluded in the proximal segment.   RCA: The RCA is ectatic and tortuous in the proximal and mid segments. The distal vessel is large caliber is normal. The rPDA and PLBs are medium caliber and normal.                     The following portions of the patient's history were reviewed and updated as  appropriate: allergies, current medications, past family history, past medical history, past social history, past surgical history and problem list.    Past Medical History:   Diagnosis Date   • A-fib (CMS/HCC)    • CAD (coronary artery disease)    • Colon polyp    • Colorblind    • Coronary artery disease involving native coronary artery of native heart with angina pectoris (CMS/HCC) 2019   • Ex-smoker    • Hyperlipidemia LDL goal <70 2019   • Hypertension    • Hypothyroidism    • ST elevation myocardial infarction (STEMI) (CMS/HCC) 2018   • Testosterone deficiency    • Ventricular fibrillation (CMS/[facility])        Family History   Problem Relation Age of Onset   • Arthritis Mother    • No Known Problems Father    • No Known Problems Brother    • No Known Problems Daughter        Social History     Socioeconomic History   • Marital status:      Spouse name: Not on file   • Number of children: Not on file   • Years of education: Not on file   • Highest education level: Not on file   Tobacco Use   • Smoking status: Former Smoker     Types: Cigars     Last attempt to quit: 2010     Years since quittin.0   • Smokeless tobacco: Never Used   • Tobacco comment: Caffeine - coffee 3 daily,   Substance and Sexual Activity   • Alcohol use: Yes     Frequency: Never     Comment: occassional/less than 2oz per month    • Drug use: No   • Sexual activity: Yes     Partners: Female       Allergies   Allergen Reactions   • Meperidine Anaphylaxis     Per patient's wife, he stopped breathing   • Hydrocodone-Acetaminophen Hallucinations   • Oxycodone-Acetaminophen Hallucinations       Past Surgical History:   Procedure Laterality Date   • ADENOIDECTOMY     • BACK SURGERY     • CARDIAC ABLATION     • CARDIAC CATHETERIZATION N/A 2018    Procedure: Left Heart Cath;  Surgeon: Анна Booker MD;  Location: [facility] INVASIVE LOCATION;  Service: Cardiovascular   • CARDIAC CATHETERIZATION N/A 2018     Procedure: Coronary angiography;  Surgeon: Анна Booker MD;  Location:  [facility] CATH INVASIVE LOCATION;  Service: Cardiovascular   • CARDIAC CATHETERIZATION N/A 12/26/2018    Procedure: Stent REMA coronary;  Surgeon: Анна Booker MD;  Location:  [facility]ATH INVASIVE LOCATION;  Service: Cardiovascular   • CARDIAC CATHETERIZATION  12/26/2018    Procedure: Percutaneous Mechanical Thrombectomy;  Surgeon: Анна Booker MD;  Location:  [facility] INVASIVE LOCATION;  Service: Cardiovascular   • CARDIAC ELECTROPHYSIOLOGY STUDY AND ABLATION     • COLONOSCOPY     • COLONOSCOPY N/A 6/26/2017    Procedure: COLONOSCOPY AT BEDSIDE;  Surgeon: Fadi Gallagher MD;  Location:  [facility] ENDOSCOPY;  Service:    • EP STUDY     • EXTERNAL EAR SURGERY     • HAND SURGERY     • INGUINAL HERNIA REPAIR     • MANDIBLE SURGERY     • NOSE SURGERY     • MA RT/LT HEART CATHETERS N/A 12/26/2018    Procedure: Percutaneous Coronary Intervention;  Surgeon: Анан Booker MD;  Location:  [facility] INVASIVE LOCATION;  Service: Cardiovascular   • SHOULDER SURGERY     • THYROIDECTOMY     • TONSILLECTOMY         Review of Systems   Constitution: Positive for malaise/fatigue and weight loss.   Cardiovascular: Positive for dyspnea on exertion and irregular heartbeat. Negative for chest pain, leg swelling, near-syncope, orthopnea, palpitations, paroxysmal nocturnal dyspnea and syncope.   Respiratory: Positive for shortness of breath.        Procedures       Objective:     Physical Exam  GEN: no distress, alert and oriented  Lungs CTAB, no rales or wheezes  Chest: no abnormalities  Heart: RRR, no murmurs  Abdo: soft,  Nontender, nondistended  Extr: no edema, +2 DP and 2+ carotid pulses b/l  Skin: no rash or bruising  Psych: organized thought, normal behavior and affect    Lab Review:         Assessment:          Diagnosis Plan   1. Coronary artery disease of native artery of native heart with stable angina pectoris (CMS/HCC)  Case Request Cath Lab: Coronary  angiography, Left heart catheterization, Left ventricular angiography  PCI mid LAD          Plan:         1.Coronary Artery Disease  Assessment  • The patient has CCS class II - angina only during vigorous physical activity  • Mr. Gomez has returned to exercise and feels like his dyspnea is not completely resolved.  He does not have any further chest pain, however im worried that his dyspnea is related to the severe mid LAD lesion.  I've asked him to schedule a cardiac catheterization for planned PCI of the mid LAD in the next few weeks.  He is tolerating his Effient and aspirin well.  He should continue his metoprolol 25 mg daily.  He does have a cough, if this continues I will assume it is associated with lisinopril and switch him to an ARB.    Subjective - Objective  • There is a history of past MI on or around 12/26/2018  • There has been a previous stent procedure using REMA  • Current antiplatelet therapy includes aspirin 81 mg and prasugrel 10 mg      2. Hypothyroid: managed by Dr Gutierrez. On Syntrhoid. Advised to avoid testosterone replacment.   3. Obesity : has lost 10 lbs with diet changes           Анна Booker MD  01/28/19

## 2019-02-13 ENCOUNTER — HOSPITAL ENCOUNTER (OUTPATIENT)
Facility: HOSPITAL | Age: 59
Discharge: HOME | End: 2019-02-14
Attending: INTERNAL MEDICINE | Admitting: INTERNAL MEDICINE

## 2019-02-13 DIAGNOSIS — I25.118 CORONARY ARTERY DISEASE OF NATIVE ARTERY OF NATIVE HEART WITH STABLE ANGINA PECTORIS (HCC): ICD-10-CM

## 2019-02-13 PROCEDURE — C1887 CATHETER, GUIDING: HCPCS | Performed by: INTERNAL MEDICINE

## 2019-02-13 PROCEDURE — C1769 GUIDE WIRE: HCPCS | Performed by: INTERNAL MEDICINE

## 2019-02-13 PROCEDURE — C9600 PERC DRUG-EL COR STENT SING: HCPCS | Performed by: INTERNAL MEDICINE

## 2019-02-13 PROCEDURE — 25010000002 MIDAZOLAM PER 1 MG: Performed by: INTERNAL MEDICINE

## 2019-02-13 PROCEDURE — 99153 MOD SED SAME PHYS/QHP EA: CPT | Performed by: INTERNAL MEDICINE

## 2019-02-13 PROCEDURE — 85347 COAGULATION TIME ACTIVATED: CPT

## 2019-02-13 PROCEDURE — 93454 CORONARY ARTERY ANGIO S&I: CPT | Performed by: INTERNAL MEDICINE

## 2019-02-13 PROCEDURE — G0378 HOSPITAL OBSERVATION PER HR: HCPCS

## 2019-02-13 PROCEDURE — C1725 CATH, TRANSLUMIN NON-LASER: HCPCS | Performed by: INTERNAL MEDICINE

## 2019-02-13 PROCEDURE — 93010 ELECTROCARDIOGRAM REPORT: CPT | Performed by: INTERNAL MEDICINE

## 2019-02-13 PROCEDURE — 99152 MOD SED SAME PHYS/QHP 5/>YRS: CPT | Performed by: INTERNAL MEDICINE

## 2019-02-13 PROCEDURE — C1874 STENT, COATED/COV W/DEL SYS: HCPCS | Performed by: INTERNAL MEDICINE

## 2019-02-13 PROCEDURE — 92928 PRQ TCAT PLMT NTRAC ST 1 LES: CPT | Performed by: INTERNAL MEDICINE

## 2019-02-13 PROCEDURE — 93005 ELECTROCARDIOGRAM TRACING: CPT | Performed by: INTERNAL MEDICINE

## 2019-02-13 PROCEDURE — 25010000002 HEPARIN (PORCINE) PER 1000 UNITS: Performed by: INTERNAL MEDICINE

## 2019-02-13 PROCEDURE — 0 IOPAMIDOL PER 1 ML: Performed by: INTERNAL MEDICINE

## 2019-02-13 PROCEDURE — 25010000002 FENTANYL CITRATE (PF) 100 MCG/2ML SOLUTION: Performed by: INTERNAL MEDICINE

## 2019-02-13 PROCEDURE — 25010000002 BH (CUPID ONLY) ADENOSINE 6 MG/100ML MIXTURE: Performed by: INTERNAL MEDICINE

## 2019-02-13 PROCEDURE — C1894 INTRO/SHEATH, NON-LASER: HCPCS | Performed by: INTERNAL MEDICINE

## 2019-02-13 DEVICE — XIENCE SIERRA™ EVEROLIMUS ELUTING CORONARY STENT SYSTEM 3.50 MM X 33 MM / RAPID-EXCHANGE
Type: IMPLANTABLE DEVICE | Status: FUNCTIONAL
Brand: XIENCE SIERRA™

## 2019-02-13 DEVICE — XIENCE SIERRA™ EVEROLIMUS ELUTING CORONARY STENT SYSTEM 3.00 MM X 18 MM / RAPID-EXCHANGE
Type: IMPLANTABLE DEVICE | Status: FUNCTIONAL
Brand: XIENCE SIERRA™

## 2019-02-13 DEVICE — XIENCE SIERRA™ EVEROLIMUS ELUTING CORONARY STENT SYSTEM 4.00 MM X 12 MM / RAPID-EXCHANGE
Type: IMPLANTABLE DEVICE | Status: FUNCTIONAL
Brand: XIENCE SIERRA™

## 2019-02-13 RX ORDER — FENTANYL CITRATE 50 UG/ML
INJECTION, SOLUTION INTRAMUSCULAR; INTRAVENOUS AS NEEDED
Status: DISCONTINUED | OUTPATIENT
Start: 2019-02-13 | End: 2019-02-13 | Stop reason: HOSPADM

## 2019-02-13 RX ORDER — LEVOTHYROXINE SODIUM 0.15 MG/1
150 TABLET ORAL DAILY
Status: DISCONTINUED | OUTPATIENT
Start: 2019-02-13 | End: 2019-02-14 | Stop reason: HOSPADM

## 2019-02-13 RX ORDER — LIDOCAINE HYDROCHLORIDE 20 MG/ML
INJECTION, SOLUTION INFILTRATION; PERINEURAL AS NEEDED
Status: DISCONTINUED | OUTPATIENT
Start: 2019-02-13 | End: 2019-02-13 | Stop reason: HOSPADM

## 2019-02-13 RX ORDER — ASPIRIN 81 MG/1
81 TABLET, CHEWABLE ORAL DAILY
Status: DISCONTINUED | OUTPATIENT
Start: 2019-02-14 | End: 2019-02-14 | Stop reason: HOSPADM

## 2019-02-13 RX ORDER — SODIUM CHLORIDE 9 MG/ML
75 INJECTION, SOLUTION INTRAVENOUS CONTINUOUS
Status: DISCONTINUED | OUTPATIENT
Start: 2019-02-13 | End: 2019-02-14 | Stop reason: HOSPADM

## 2019-02-13 RX ORDER — LIDOCAINE HYDROCHLORIDE 10 MG/ML
0.1 INJECTION, SOLUTION EPIDURAL; INFILTRATION; INTRACAUDAL; PERINEURAL ONCE AS NEEDED
Status: DISCONTINUED | OUTPATIENT
Start: 2019-02-13 | End: 2019-02-13

## 2019-02-13 RX ORDER — SODIUM CHLORIDE 0.9 % (FLUSH) 0.9 %
3 SYRINGE (ML) INJECTION EVERY 12 HOURS SCHEDULED
Status: DISCONTINUED | OUTPATIENT
Start: 2019-02-13 | End: 2019-02-13

## 2019-02-13 RX ORDER — SODIUM CHLORIDE 9 MG/ML
100 INJECTION, SOLUTION INTRAVENOUS CONTINUOUS
Status: DISCONTINUED | OUTPATIENT
Start: 2019-02-13 | End: 2019-02-14 | Stop reason: HOSPADM

## 2019-02-13 RX ORDER — SODIUM CHLORIDE 0.9 % (FLUSH) 0.9 %
3-10 SYRINGE (ML) INJECTION AS NEEDED
Status: DISCONTINUED | OUTPATIENT
Start: 2019-02-13 | End: 2019-02-13

## 2019-02-13 RX ORDER — ATORVASTATIN CALCIUM 80 MG/1
80 TABLET, FILM COATED ORAL NIGHTLY
Status: DISCONTINUED | OUTPATIENT
Start: 2019-02-13 | End: 2019-02-14 | Stop reason: HOSPADM

## 2019-02-13 RX ORDER — HEPARIN SODIUM 1000 [USP'U]/ML
INJECTION, SOLUTION INTRAVENOUS; SUBCUTANEOUS AS NEEDED
Status: DISCONTINUED | OUTPATIENT
Start: 2019-02-13 | End: 2019-02-13 | Stop reason: HOSPADM

## 2019-02-13 RX ORDER — MIDAZOLAM HYDROCHLORIDE 1 MG/ML
INJECTION INTRAMUSCULAR; INTRAVENOUS AS NEEDED
Status: DISCONTINUED | OUTPATIENT
Start: 2019-02-13 | End: 2019-02-13 | Stop reason: HOSPADM

## 2019-02-13 RX ORDER — PRASUGREL 10 MG/1
10 TABLET, FILM COATED ORAL DAILY
Status: DISCONTINUED | OUTPATIENT
Start: 2019-02-14 | End: 2019-02-14 | Stop reason: HOSPADM

## 2019-02-13 RX ADMIN — SODIUM CHLORIDE 75 ML/HR: 9 INJECTION, SOLUTION INTRAVENOUS at 07:37

## 2019-02-13 RX ADMIN — METOPROLOL TARTRATE 25 MG: 25 TABLET ORAL at 20:26

## 2019-02-13 RX ADMIN — SODIUM CHLORIDE 100 ML/HR: 9 INJECTION, SOLUTION INTRAVENOUS at 12:16

## 2019-02-13 RX ADMIN — ATORVASTATIN CALCIUM 80 MG: 20 TABLET, FILM COATED ORAL at 20:26

## 2019-02-13 NOTE — DISCHARGE INSTRUCTIONS
[facility]  [address] [place] [address]    Coronary Angiogram with Stent (Radial Approach) After Care    Refer to this sheet in the next few weeks. These instructions provide you with information on caring for yourself after your procedure. Your health care provider may also give you more specific instructions. Your treatment has been planned according to current medical practices, but problems sometimes occur. Call your health care provider if you have any problems or questions after your procedure.       Home Care Instructions:  · You may shower the day after the procedure. Remove the bandage (dressing) and gently wash the site with plain soap and water. Gently pat the site dry. You may apply a band aid daily for 2 days if desired.    · Do not apply powder or lotion to the site.  · Do not submerge the affected site in water for 3 to 5 days or until the site is completely healed.   · Do not flex or bend the affected arm for 24 hours.  · Do not lift, push or pull anything over 10 pounds for 2 days after your procedure.  · Do not operate machinery or power tools for 24 hours.  · Inspect the site at least twice daily. You may notice some bruising at the site and it may be tender for 1 to 2 weeks.     · Increase your fluid intake for the next 2 days.    · Keep arm elevated for 24 hours.  For the remainder of the day, keep your arm in the “Pledge of Allegiance” position when up and about.    · Limit your activity for the next 48 hours and avoid strenuous activity as directed by your physician.   · Cardiac Rehab may or may not be ordered.  Please consult with your physician  · You may drive 24 hours after the procedure unless otherwise instructed by your caregiver.  · A responsible adult should be with you for the first 24 hours after you arrive home.   · Do not make any important legal decisions or sign legal papers for 24 hours. Do not drink alcohol for 24 hours.    · Take medicines only as  directed by your health care provider.  Blood thinners may be prescribed after your procedure to improve blood flow through the stent.    · Metformin or any medications containing Metformin should not be taken for 48 hours after your procedure.    · Eat a heart-healthy diet. This should include plenty of fresh fruits and vegetables. Meat should be lean cuts. Avoid the following types of food:    ¨ Food that is high in salt.    ¨ Canned or highly processed food.    ¨ Food that is high in saturated fat or sugar.    ¨ Fried food.    · Make any other lifestyle changes recommended by your health care provider. This may include:    ¨ Not using any tobacco products including cigarettes, chewing tobacco, or electronic cigarettes.   ¨ Managing your weight.    ¨ Getting regular exercise.    ¨ Managing your blood pressure.    ¨ Limiting your alcohol intake.    ¨ Managing other health problems, such as diabetes.    · If you need an MRI after your heart stent was placed, be sure to tell the health care provider who orders the MRI that you have a heart stent.    · Keep all follow-up visits as directed by your health care provider.    ·   Call Your Doctor If:  · You have unusual pain at the radial/ulnar (wrist) site.  · You have redness, warmth, swelling, or pain at the radial/ulnar (wrist) site.  · You have drainage (other than a small amount of blood on the dressing).  · `You have chills or a fever > 101.  · Your arm becomes pale or dark, cool, tingly, or numb.  · You develop chest pain, shortness of breath, feel faint or pass out.    · You have heavy bleeding from the site, hold pressure on the site for 20 minutes.  If the bleeding stops, apply a fresh bandage and call your cardiologist.  However, if you continue to have bleeding, call 911.        Make Sure You:   · Understand these instructions.  · Will watch your condition.  · Will get help right away if you are not doing well or get worse.

## 2019-02-13 NOTE — PLAN OF CARE
Problem: Patient Care Overview  Goal: Plan of Care Review   02/13/19 5505   Coping/Psychosocial   Plan of Care Reviewed With patient   OTHER   Outcome Summary s/p PCI via right radial. No c/o noted. should dc home tomorrow

## 2019-02-13 NOTE — CONSULTS
Met with patient and his wofe during his previous hospital stay. He plans to attend cardiac rehab at [facility] in [place]. They have spoke with the rehab staff there this week..

## 2019-02-14 VITALS
DIASTOLIC BLOOD PRESSURE: 78 MMHG | OXYGEN SATURATION: 95 % | HEIGHT: 74 IN | BODY MASS INDEX: 30.54 KG/M2 | RESPIRATION RATE: 16 BRPM | WEIGHT: 238 LBS | TEMPERATURE: 97.5 F | HEART RATE: 61 BPM | SYSTOLIC BLOOD PRESSURE: 139 MMHG

## 2019-02-14 LAB
ACT BLD: 268 SECONDS (ref 82–152)
ACT BLD: 274 SECONDS (ref 82–152)
ANION GAP SERPL CALCULATED.3IONS-SCNC: 8.7 MMOL/L
BUN BLD-MCNC: 14 MG/DL (ref 6–20)
BUN/CREAT SERPL: 14.9 (ref 7–25)
CALCIUM SPEC-SCNC: 8.4 MG/DL (ref 8.6–10.5)
CHLORIDE SERPL-SCNC: 110 MMOL/L (ref 98–107)
CO2 SERPL-SCNC: 22.3 MMOL/L (ref 22–29)
CREAT BLD-MCNC: 0.94 MG/DL (ref 0.76–1.27)
DEPRECATED RDW RBC AUTO: 42.8 FL (ref 37–54)
ERYTHROCYTE [DISTWIDTH] IN BLOOD BY AUTOMATED COUNT: 12.7 % (ref 12.3–15.4)
GFR SERPL CREATININE-BSD FRML MDRD: 83 ML/MIN/1.73
GLUCOSE BLD-MCNC: 86 MG/DL (ref 65–99)
HCT VFR BLD AUTO: 41.9 % (ref 37.5–51)
HGB BLD-MCNC: 14.2 G/DL (ref 13–17.7)
MCH RBC QN AUTO: 31.3 PG (ref 26.6–33)
MCHC RBC AUTO-ENTMCNC: 33.9 G/DL (ref 31.5–35.7)
MCV RBC AUTO: 92.3 FL (ref 79–97)
PLATELET # BLD AUTO: 151 10*3/MM3 (ref 140–450)
PMV BLD AUTO: 9.3 FL (ref 6–12)
POTASSIUM BLD-SCNC: 4.2 MMOL/L (ref 3.5–5.2)
RBC # BLD AUTO: 4.54 10*6/MM3 (ref 4.14–5.8)
SODIUM BLD-SCNC: 141 MMOL/L (ref 136–145)
WBC NRBC COR # BLD: 6.35 10*3/MM3 (ref 3.4–10.8)

## 2019-02-14 PROCEDURE — 99217 PR OBSERVATION CARE DISCHARGE MANAGEMENT: CPT | Performed by: INTERNAL MEDICINE

## 2019-02-14 PROCEDURE — G0378 HOSPITAL OBSERVATION PER HR: HCPCS

## 2019-02-14 PROCEDURE — 93005 ELECTROCARDIOGRAM TRACING: CPT | Performed by: INTERNAL MEDICINE

## 2019-02-14 PROCEDURE — 93010 ELECTROCARDIOGRAM REPORT: CPT | Performed by: INTERNAL MEDICINE

## 2019-02-14 PROCEDURE — 85027 COMPLETE CBC AUTOMATED: CPT | Performed by: INTERNAL MEDICINE

## 2019-02-14 PROCEDURE — 80048 BASIC METABOLIC PNL TOTAL CA: CPT | Performed by: INTERNAL MEDICINE

## 2019-02-14 RX ADMIN — METOPROLOL TARTRATE 25 MG: 25 TABLET ORAL at 08:45

## 2019-02-14 RX ADMIN — ASPIRIN 81 MG: 81 TABLET, CHEWABLE ORAL at 08:45

## 2019-02-14 RX ADMIN — LEVOTHYROXINE SODIUM 150 MCG: 150 TABLET ORAL at 08:45

## 2019-02-14 RX ADMIN — PRASUGREL 10 MG: 10 TABLET, FILM COATED ORAL at 08:45

## 2019-02-14 NOTE — PLAN OF CARE
Problem: Patient Care Overview  Goal: Plan of Care Review  Outcome: Ongoing (interventions implemented as appropriate)   02/14/19 0221   Coping/Psychosocial   Plan of Care Reviewed With patient   OTHER   Outcome Summary VSS. No complaints of pain. R wrist C/D/I and soft. Will continue to monitor   Plan of Care Review   Progress improving       Problem: Cardiac Catheterization (Diagnostic/Interventional) (Adult)  Goal: Anesthesia/Sedation Recovery  Outcome: Ongoing (interventions implemented as appropriate)

## 2019-02-14 NOTE — DISCHARGE SUMMARY
Garrett Gomez Jr.  [id number]    Date of Admit: 2/13/2019  Date of Discharge:  2/14/2019    Discharge Diagnosis:  Active Hospital Problems    Diagnosis Date Noted   • **Coronary artery disease of native artery of native heart with stable angina pectoris (CMS/[facility]) [I25.118] 01/28/2019      Resolved Hospital Problems   No resolved problems to display.       Hospital Course: Mr. Stone is a 58 year old pt had STEMI 12/26/18 and had REMA to circumflex at that time. He also had severe LAD stenosis that was not addressed at that admission.  Do to recurrent CP he returned on 2/13/2019 for intervention to his LAD . He underwent Successful PCI of the proximal to mid LAD using 3 drug-eluting stents. The case was complicated by a proximal LAD dissection which was not flow-limiting and repaired with stenting.Today he is pain free and maintained normal sinus rhythm. His renal function is stable post cath. His right radial cath site is shows no hematoma and minimal bruising. He will be discharged on dual antiplatelet therapy. He is to follow up with Louisa in 1 week and my self in 4 weeks.          Procedures Performed  Procedure(s):  Coronary angiography  Stent REMA coronary  Left Heart Cath  Left ventriculography    FINDINGS:     1.  CORONARY ANGIOGRAPHY:   Left main: Large caliber left main coronary artery without disease.  It bifurcates into a circumflex and LAD.  LAD: The proximal LAD is large caliber.  There is a severe, calcified 75-80% lesion in the proximal LAD.  The proximal LAD is very tortuous.  There is a short normal section of mid LAD at the level of the first septal.  Just after that is a very tight 90% mid LAD lesion at the level of the bifurcation of the second diagonal.  The rest of the mid and distal LAD tapers from medium to small caliber.  The first diagonal is a medium caliber vessel which is tortuous and essentially normal.  The second diagonal is a small caliber vessel which bifurcates and is  essentially normal.  Left circumflex: The circumflex is a medium caliber vessel which extends into a medium caliber OM 3.  The previously placed proximal stent is widely patent.  RCA: Not visualized     4. CORONARY INTERVENTION FINDINGS:  PCI CORONARY SEGMENT: proximal to mid LAD  PRE-STENOSIS: 80-90%  POST-STENOSIS: 0%  LESION TYPE: C  LOUISA FLOW PRE/POST: II/III  CULPRIT LESION: yes  DISSECTION:   yes     SUMMARY:   FINDINGS:     1.  CORONARY ANGIOGRAPHY:   Left main: Large caliber left main coronary artery without disease.  It bifurcates into a circumflex and LAD.  LAD: The proximal LAD is large caliber.  There is a severe, calcified 75-80% lesion in the proximal LAD.  The proximal LAD is very tortuous.  There is a short normal section of mid LAD at the level of the first septal.  Just after that is a very tight 90% mid LAD lesion at the level of the bifurcation of the second diagonal.  The rest of the mid and distal LAD tapers from medium to small caliber.  The first diagonal is a medium caliber vessel which is tortuous and essentially normal.  The second diagonal is a small caliber vessel which bifurcates and is essentially normal.  Left circumflex: The circumflex is a medium caliber vessel which extends into a medium caliber OM 3.  The previously placed proximal stent is widely patent.  RCA: Not visualized     4. CORONARY INTERVENTION FINDINGS:  PCI CORONARY SEGMENT: proximal to mid LAD  PRE-STENOSIS: 80-90%  POST-STENOSIS: 0%  LESION TYPE: C  LOUISA FLOW PRE/POST: II/III  CULPRIT LESION: yes  DISSECTION:   yes     SUMMARY: Successful PCI of the proximal to mid LAD using 3 drug-eluting stents.  The case was complicated by a proximal LAD dissection which was not flow-limiting and repaired with stenting.     RECOMMENDATIONS: Indefinite DAPT.          RECOMMENDATIONS: Indefinite DAPT.              Consults     No orders found for last 30 day(s).          Discharge Medications     Your medication list      ASK your  doctor about these medications      Instructions Last Dose Given Next Dose Due   aspirin 81 MG chewable tablet      Chew 1 tablet Daily.       atorvastatin 80 MG tablet  Commonly known as:  LIPITOR      Take 1 tablet by mouth Every Night.       levothyroxine 150 MCG tablet  Commonly known as:  SYNTHROID, LEVOTHROID      Take 1 tablet by mouth Daily.       metoprolol tartrate 25 MG tablet  Commonly known as:  LOPRESSOR      Take 1 tablet by mouth Every 12 (Twelve) Hours.       prasugrel 10 MG tablet  Commonly known as:  EFFIENT      Take 1 tablet by mouth Daily.       VITAMIN D PO      Take 5,000 Int'l Units by mouth Daily. 2 tablets daily              Discharge Diet: Healthy Heart diet    Activity at Discharge: no lifting more than 5 pounds for 5 days    Discharge disposition: home  Condition on Discharge:stable    Follow-up Appointments  Future Appointments   Date Time Provider Department Center   2/15/2019 10:20 AM [facility] None   4/25/2019  1:00 PM Joan Perez MD [facility] None   5/2/2019  9:00 AM Chato Gutierrez MD [facility] None         Test Results Pending at Discharge       Maribeth Rice RN  02/14/19  11:19 AM          Анна Booker MD  [facility]  02/15/19

## 2019-02-15 ENCOUNTER — RESULTS ENCOUNTER (OUTPATIENT)
Dept: ENDOCRINOLOGY | Age: 59
End: 2019-02-15

## 2019-02-15 DIAGNOSIS — E89.0 POSTABLATIVE HYPOTHYROIDISM: ICD-10-CM

## 2019-02-16 LAB
T4 FREE SERPL-MCNC: 1.54 NG/DL (ref 0.82–1.77)
TSH SERPL DL<=0.005 MIU/L-ACNC: 1.84 UIU/ML (ref 0.45–4.5)

## 2019-02-18 NOTE — PROGRESS NOTES
Date of Office Visit: 2019  Encounter Provider: TASHA Mendez  Place of Service: [facility] CARDIOLOGY  Patient Name: Garrett Gomez Jr.  :1960      Subjective:     Chief Complaint:  CAD status post STEMI with 2 REMA to Circumflex, and now 3 REMA to prox-mid LAD    History of Present Illness:  Garrett Gomez Jr. is a pleasant 58 y.o. male who I have seen once before.  Outside records have been obtained and reviewed by me.  He has a past medical history of hypertension, hyperlipidemia and hypothyroidism.    The patient presented to the hospital on 18 with chest pain radiating to both arms and upper abdomen.  He had a CT angiogram of chest and abdomen to rule out dissection.  With an enlarged aorta but no evidence of dissection.  EKG ruled him in for STEMI with changes in V7 and V8 and he underwent cardiac catheterization which found occluded circumflex which was treated with thrombectomy and drug-eluting stent placement.  He also had severe mid LAD stenosis but Dr. Booker reported this will be addressed as an outpatient. He was discharged on 18 with aspirin, Effient, Lipitor, lisinopril and metoprolol tartrate.  His echo showed normal LV function with an EF of 53%.  He was chest pain-free at discharge.  It was also noted during his hospitalization that he was recently diagnosed with diverticulitis and has been treated with Cipro and Flagyl which were continued.  Also in regards to his hypothyroidism he was on a compounded T3-T4, DHEA and testosterone which he was receiving from [place] and wanted to go back to Synthroid.  Dr. Booker consulted endocrinology who switched the patient back to level thyroxine and had him discontinue his T3-T4 compound, DHEA and testosterone supplements. The patient was supposed to follow-up with Dr. Vu in 1 month. Also in the patient's chart it states he has a history of atrial fibrillation and ventricular  "fibrillation?    I saw him for the first time on 1/4/19 for a post-PCI follow-up visit.  He reported he was feeling \"fastastic.\" He denied chest pain, shortness of breath, palpitations, edema, paroxysmal nocturnal dyspnea, orthopnea, dizziness, lightheadedness, syncope, near syncope or fatigue.  He denied any abdominal pain, nausea, vomiting, fevers or chills.   He states he has way more energy than he had before.  He reported he was back on duty as a  and was having no issues with his duties at work.  He had not been back to exercising just yet.  He normally lifts some weights and walks on the treadmill.  He was tolerating all his new medications without side effects/issues.  In regards to his suggested pulmonary hypertension on his CTA of chest we talked about ordering a sleep study but he wanted to wait until after the planned PCI of his LAD lesion which was going to be discussed at his 1 month follow-up with Dr. Booker.  His blood pressure was still running a little high so I increase his lisinopril from 10 mg to 20 mg daily.    On 1/28/19 the patient had follow-up visit with Dr. Booker.  He was reporting some exertional dyspnea with his exercising but no chest pain.  Dr. Booker felt that his symptoms were possibly related to his untreated mid LAD lesion.  She wanted him to get set up for cardiac catheterization in the next couple weeks.  She advised that he needed to avoid testosterone replacement therapy.  It was also reported that the patient was developing a cough so she wanted to switch his lisinopril to an ARB.    On 2/13/19 who presented back for intervention to his LAD.  He underwent successful PCI of the proximal to mid LAD using 3 drug-eluting stents.  The case is complicated by dissection of the proximal LAD which was not flow-limiting and was repaired with stenting.  He was pain-free the following day and maintain normal sinus rhythm.  He had no issues with his right radial cath site at " discharge.    He is here today for one-week post PCI follow-up visit.  He has been doing well since his last PCI.  He denies any chest pain, shortness of breath, swelling of the legs, dizziness, lightheadedness, syncope or near syncope.  He reports occasional skipped beats of his heart lasting 10-15 seconds.  He has no other symptoms with this and reports it is very mild and short-lived.  He reports a mild headache for the last week that improves with Tylenol.  He also reports his cough is going away after being off the lisinopril.  He was never started on an ARB.  His blood pressure is 148/94 today.  He reports at home he occasionally gets in the 140s/90s.  He denies any side effects or bleeding issues on his DAPT. he spoke with [facility] this past week to schedule his cardiac rehab.  He is going to call them back today to get scheduled.  He reports he has a new patient appointment with his new PCP Dr. Tate in April.        Past Medical History:   Diagnosis Date   • A-fib (CMS/[facility])    • CAD (coronary artery disease)    • Colon polyp    • Colorblind    • Coronary artery disease involving native coronary artery of native heart with angina pectoris (CMS/[facility]) 1/2/2019   • Ex-smoker    • Hyperlipidemia LDL goal <70 1/2/2019   • Hypertension    • Hypothyroidism    • ST elevation myocardial infarction (STEMI) (CMS/[facility]) 12/26/2018   • Testosterone deficiency    • Ventricular fibrillation (CMS/[facility])      Past Surgical History:   Procedure Laterality Date   • ADENOIDECTOMY     • BACK SURGERY     • CARDIAC CATHETERIZATION N/A 12/26/2018    Procedure: Left Heart Cath;  Surgeon: Анна Booker MD;  Location: [facility] CATH INVASIVE LOCATION;  Service: Cardiovascular   • CARDIAC CATHETERIZATION N/A 12/26/2018    Procedure: Coronary angiography;  Surgeon: Анна Booker MD;  Location: [facility] CATH INVASIVE LOCATION;  Service: Cardiovascular   • CARDIAC CATHETERIZATION N/A 12/26/2018    Procedure: Stent REMA coronary;  Surgeon:  Анна Booker MD;  Location:  [facility] CATH INVASIVE LOCATION;  Service: Cardiovascular   • CARDIAC CATHETERIZATION  12/26/2018    Procedure: Percutaneous Mechanical Thrombectomy;  Surgeon: Анна Booker MD;  Location:  [facility] CATH INVASIVE LOCATION;  Service: Cardiovascular   • CARDIAC CATHETERIZATION N/A 2/13/2019    Procedure: Coronary angiography;  Surgeon: Анна Booker MD;  Location: [facility] CATH INVASIVE LOCATION;  Service: Cardiology   • CARDIAC CATHETERIZATION N/A 2/13/2019    Procedure: Stent REMA coronary;  Surgeon: Анна Booker MD;  Location:  [facility] CATH INVASIVE LOCATION;  Service: Cardiology   • CARDIAC CATHETERIZATION N/A 2/13/2019    Procedure: Left Heart Cath;  Surgeon: Анна Booker MD;  Location:  [facility] CATH INVASIVE LOCATION;  Service: Cardiology   • CARDIAC CATHETERIZATION N/A 2/13/2019    Procedure: Left ventriculography;  Surgeon: Анна Booker MD;  Location: [facility] CATH INVASIVE LOCATION;  Service: Cardiology   • CARDIAC ELECTROPHYSIOLOGY STUDY AND ABLATION     • COLONOSCOPY     • COLONOSCOPY N/A 6/26/2017    Procedure: COLONOSCOPY AT BEDSIDE;  Surgeon: Fadi Gallagher MD;  Location: [facility] ENDOSCOPY;  Service:    • CORONARY ANGIOPLASTY     • EXTERNAL EAR SURGERY     • HAND SURGERY Left    • INGUINAL HERNIA REPAIR      X3   • MANDIBLE SURGERY     • NOSE SURGERY     • NE RT/LT HEART CATHETERS N/A 12/26/2018    Procedure: Percutaneous Coronary Intervention;  Surgeon: Анна Booker MD;  Location: [facility] CATH INVASIVE LOCATION;  Service: Cardiovascular   • SHOULDER SURGERY Bilateral    • THYROIDECTOMY     • TONSILLECTOMY       Outpatient Medications Prior to Visit   Medication Sig Dispense Refill   • aspirin 81 MG chewable tablet Chew 1 tablet Daily.     • atorvastatin (LIPITOR) 80 MG tablet Take 1 tablet by mouth Every Night. 30 tablet 11   • Cholecalciferol (VITAMIN D PO) Take 5,000 Int'l Units by mouth Daily. 2 tablets daily     • levothyroxine (SYNTHROID, LEVOTHROID) 150 MCG tablet Take 1  tablet by mouth Daily. 90 tablet 1   • metoprolol tartrate (LOPRESSOR) 25 MG tablet Take 1 tablet by mouth Every 12 (Twelve) Hours. 30 tablet 11   • prasugrel (EFFIENT) 10 MG tablet Take 1 tablet by mouth Daily. 30 tablet 11     No facility-administered medications prior to visit.        Allergies as of 2019 - Reviewed 2019   Allergen Reaction Noted   • Meperidine Anaphylaxis 10/21/2016   • Hydrocodone-acetaminophen Hallucinations 2018   • Oxycodone-acetaminophen Hallucinations 2018     Social History     Socioeconomic History   • Marital status:      Spouse name: Not on file   • Number of children: Not on file   • Years of education: Not on file   • Highest education level: Not on file   Social Needs   • Financial resource strain: Not on file   • Food insecurity - worry: Not on file   • Food insecurity - inability: Not on file   • Transportation needs - medical: Not on file   • Transportation needs - non-medical: Not on file   Occupational History   • Not on file   Tobacco Use   • Smoking status: Former Smoker     Types: Cigars     Last attempt to quit: 2010     Years since quittin.1   • Smokeless tobacco: Never Used   • Tobacco comment: Caffeine - coffee 3 daily,   Substance and Sexual Activity   • Alcohol use: Yes     Frequency: Never     Comment: occassional/less than 2oz per month    • Drug use: No   • Sexual activity: Yes     Partners: Female   Other Topics Concern   • Not on file   Social History Narrative   • Not on file     Family History   Problem Relation Age of Onset   • Arthritis Mother    • No Known Problems Father    • No Known Problems Brother    • No Known Problems Daughter      Review of Systems   Constitution: Negative for chills, fever and malaise/fatigue.   HENT: Negative for ear pain, hearing loss, nosebleeds and sore throat.    Eyes: Negative for double vision, pain, vision loss in left eye and vision loss in right eye.   Cardiovascular: Negative for chest  "pain, claudication, dyspnea on exertion, irregular heartbeat, leg swelling, near-syncope, orthopnea, palpitations, paroxysmal nocturnal dyspnea and syncope.   Respiratory: Negative for cough, shortness of breath, snoring and wheezing.    Endocrine: Negative for cold intolerance and heat intolerance.   Hematologic/Lymphatic: Negative for bleeding problem.   Skin: Negative for color change, itching, rash and unusual hair distribution.   Musculoskeletal: Negative for joint pain and joint swelling.   Gastrointestinal: Negative for abdominal pain, diarrhea, hematochezia, melena, nausea and vomiting.   Genitourinary: Negative for decreased libido, frequency, hematuria, hesitancy and incomplete emptying.   Neurological: Negative for excessive daytime sleepiness, dizziness, headaches, light-headedness, loss of balance, numbness, paresthesias and seizures.   Psychiatric/Behavioral: Negative for depression.          Objective:     Vitals:    02/20/19 0848   BP: 148/94   Pulse: 58   Resp: 18   Weight: 109 kg (241 lb)   Height: 188 cm (74\")     Body mass index is 30.94 kg/m².    PHYSICAL EXAM:  Physical Exam   Constitutional: He is oriented to person, place, and time. He appears well-developed and well-nourished. No distress.   Overweight   HENT:   Head: Normocephalic and atraumatic.   Eyes: Conjunctivae and EOM are normal. Pupils are equal, round, and reactive to light.   Wearing glasses   Neck: Trachea normal. Neck supple. No JVD present. Carotid bruit is not present.   Cardiovascular: Normal rate, regular rhythm, normal heart sounds and intact distal pulses.   No murmur heard.  Pulses:       Radial pulses are 2+ on the right side, and 2+ on the left side.        Dorsalis pedis pulses are 2+ on the right side, and 2+ on the left side.        Posterior tibial pulses are 2+ on the right side, and 2+ on the left side.   Right radial cath site well healed without erythema, there is mild bruising of medial forearm, palpable " proximal and distal pulses, good capillary refill, good motor function of hand, no thrill or bruit detected, site is soft and non-tender with no hematoma, no sensory deficits noted.     Pulmonary/Chest: Effort normal and breath sounds normal. No accessory muscle usage. No tachypnea. No respiratory distress. He has no decreased breath sounds. He has no wheezes. He has no rhonchi. He has no rales. He exhibits no tenderness.   Abdominal: Soft. Bowel sounds are normal. He exhibits no distension. There is no tenderness. There is no rebound.   Overweight, compressible.    Musculoskeletal: Normal range of motion. He exhibits no edema.   Neurological: He is alert and oriented to person, place, and time.   Skin: Skin is warm, dry and intact. No bruising noted. He is not diaphoretic. No erythema.   Psychiatric: He has a normal mood and affect. His speech is normal and behavior is normal. Judgment and thought content normal. Cognition and memory are normal.   Nursing note and vitals reviewed.        ECG 12 Lead  Date/Time: 2/20/2019 8:51 AM  Performed by: Louisa Navas APRN  Authorized by: Louisa Navas APRN   Comparison: compared with previous ECG from 2/14/2019  Similar to previous ECG  Comparison to previous ECG: HR higher  Rhythm: sinus bradycardia  Rate: bradycardic  BPM: 58  Q waves: III    ST Flattening: V1 and aVL  QRS axis: left  Comments: Otherwise normal ECG  Indication: CAD        2/13/19 Cardiac Catheterization:  FINDINGS:     1.  CORONARY ANGIOGRAPHY:   Left main: Large caliber left main coronary artery without disease.  It bifurcates into a circumflex and LAD.  LAD: The proximal LAD is large caliber.  There is a severe, calcified 75-80% lesion in the proximal LAD.  The proximal LAD is very tortuous.  There is a short normal section of mid LAD at the level of the first septal.  Just after that is a very tight 90% mid LAD lesion at the level of the bifurcation of the second diagonal.  The rest of the mid and  distal LAD tapers from medium to small caliber.  The first diagonal is a medium caliber vessel which is tortuous and essentially normal.  The second diagonal is a small caliber vessel which bifurcates and is essentially normal.  Left circumflex: The circumflex is a medium caliber vessel which extends into a medium caliber OM 3.  The previously placed proximal stent is widely patent.  RCA: Not visualized     4. CORONARY INTERVENTION FINDINGS:  PCI CORONARY SEGMENT: proximal to mid LAD  PRE-STENOSIS: 80-90%  POST-STENOSIS: 0%  LESION TYPE: C  LOUISA FLOW PRE/POST: II/III  CULPRIT LESION: yes  DISSECTION:   yes     SUMMARY: Successful PCI of the proximal to mid LAD using 3 drug-eluting stents.  The case was complicated by a proximal LAD dissection which was not flow-limiting and repaired with stenting.     RECOMMENDATIONS: Indefinite DAPT.      12/26/18 Cardiac Catheterization:  1. HEMODYNAMICS:  /50, /80.     2. LEFT VENTRICULOGRAPHY:  LVEDP 10mmHg     3. CORONARY ANGIOGRAPHY:   Left main: Ectatic, very large let main. Bifurcates in to the LAD and circumflex.   LAD: The LAD is large caliber in the proximal vessel and tapers to medium caliber in mid and distal segments. There is at least moderate disease in the proximal vessel. The first diagonal is medium caliber and has mild ostial stenosis. The mid LAD has a 70-80% stenosis just at the bifurcation of the 2nd diagonal (1,0,0 lesion). The second diagonal has a mild to moderate mid-vessel stenosis. The distal LAD is medium caliber and is essentially normal and wraps the apex.  Left circumflex: The circumflex is totally occluded in the proximal segment.   RCA: The RCA is ectatic and tortuous in the proximal and mid segments. The distal vessel is large caliber is normal. The rPDA and PLBs are medium caliber and normal.      4. CORONARY INTERVENTION FINDINGS:  PCI CORONARY SEGMENT: proximal circumflex  PRE-STENOSIS: 100%  POST-STENOSIS: 10%  LESION TYPE: C  LOUISA  FLOW PRE/POST: 0/3  CULPRIT LESION: yes  DISSECTION:   no     SUMMARY: Successful intervention with a 3.0x33 and 3.0x15mm Xience Cindy REMA. There is also severe disease of the mid LAD at the bifurcation of the second diagonal. There is severe ectasia of the RCA and LMCA and proximal LAD.      RECOMMENDATIONS: DAPT indefinitely. Discontinuation of testosterone therapy. Aggressive risk factor modification including HTN and HLD. Echo today. Hydration. Monitor in CCU.      12/26/18 Echocardiogram:  Interpretation Summary      · Calculated EF = 53.0%.  · Left ventricular systolic function is normal.  · Left ventricular wall thickness is consistent with hypertrophy. Sigmoid-shaped ventricular septum is present.  · Left ventricular diastolic dysfunction (grade I) consistent with impaired relaxation.  · Right atrial cavity size is mildly dilated.  · Right ventricular cavity is mildly dilated.  · There is calcification of the aortic valve. No stenosis  · No significant regurgitation observed     12/26/18 CTA of chest and abdomen:  IMPRESSION:  1.  Enlarged ascending aorta  without findings of a dissection  Flap.   *  Aortic root at the sinuses of Valsalva measures 4.1 cm.  *  Sinotubular junction measuring 3.4 cm.  *  Tubular ascending aorta measuring 4 cm in diameter.       2.  Enlarged main pulmonary artery suggestive of pulmonary artery  hypertension.  3.  Mild fat stranding surrounding the proximal aspect of the descending  colon with coexisting colonic diverticulosis suggestive of mild  diverticulitis. Correlation with patient history is recommended.  4.  Small hiatal hernia.    Assessment:       Diagnosis Plan   1. ST elevation myocardial infarction involving left circumflex coronary artery (CMS/HCC)  ECG 12 Lead   2. Coronary artery disease of native artery of native heart with stable angina pectoris (CMS/HCC)  ECG 12 Lead   3. S/P drug eluting coronary stent placement  ECG 12 Lead   4. Essential hypertension  ECG 12  Lead   5. Hyperlipidemia LDL goal <70  ECG 12 Lead   6. Ascending aorta enlargement (CMS/HCC)  ECG 12 Lead       Plan:     1.  Coronary Artery Disease  Assessment  • The patient has no angina  • There is a new diagnosis of stable angina in the past 12 months    Plan  • Lifestyle modifications discussed include adhering to a heart healthy diet, avoidance of tobacco products, maintenance of a healthy weight, medication compliance, regular exercise and regular monitoring of cholesterol and blood pressure    Subjective - Objective  • There is a history of past MI on or around 12/26/2018  • There has been a previous stent procedure using REMA  on or around 2/13/2019 12/26/18 REMA to Circumflex  2/13/19 3 REMA to prox-mid LAD  • Current antiplatelet therapy includes aspirin 81 mg and prasugrel 10 mg  • The patient qualifies for cardiac rehabilitation, and has been referred to cardiac rehab        S/p MI on 12/26/18 with REMA to Circumflex.  Underwent OP cath and had 3 REMA placed to prox-mid LAD lesion-complicated by dissection and treated with stenting on 2/13/19. Dual antiplatelet therapy recommended indefinitely.    2. Hypertension: On Metoprolol tartrate 25 mg twice a day. Lisinopril was supposed to be switched to ARB. I will order Losartan 50 mg daily. Patient to continue to monitor b/p at home and see if headaches improved.     3. Enlarged ascending aorta- detected on CT angiogram of chest/abdomen 12/26/18.  No evidence of dissection.  Focus on good blood pressure control.    4. Hyperlipidemia: started on Atorvastatin 80 mg daily in Dec. 2018. Needs recheck fasting lipid panel and hepatic function panel by PCP when he establishes care in April. He demonstrated understanding to let us know if his PCP is not going to repeat labs and we will order.     5. Right ventricular dilation: Suggestive findings of pulm HTN on CTA d/t prominent main pulmonary artery on 12/26/18.  Patient  has no shortness of breath or orthopnea.  We  did discuss doing a possible sleep apnea work up in the future.     6. Hypothyroidism: Now managed by Dr. Heredia.    I advised on the importance of good blood pressure, blood sugar and cholesterol control, as well as regular exercise and weight loss and avoidance of tobacco for cardiovascular disease risk factor modification.     Overall the patient is doing well. Will add ARB and monitor b/p. He will be starting cardiac rehab soon.  Follow up with Dr. Booker on 3/19/19, unless otherwise needed sooner.  I advised the patient to contact our office with any questions or concerns.         Your medication list           Accurate as of 2/20/19  9:43 AM. If you have any questions, ask your nurse or doctor.               START taking these medications      Instructions Last Dose Given Next Dose Due   losartan 50 MG tablet  Commonly known as:  COZAAR  Started by:  TASHA Mendez      Take 1 tablet by mouth Daily.          CONTINUE taking these medications      Instructions Last Dose Given Next Dose Due   aspirin 81 MG chewable tablet      Chew 1 tablet Daily.       atorvastatin 80 MG tablet  Commonly known as:  LIPITOR      Take 1 tablet by mouth Every Night.       levothyroxine 150 MCG tablet  Commonly known as:  SYNTHROID, LEVOTHROID      Take 1 tablet by mouth Daily.       metoprolol tartrate 25 MG tablet  Commonly known as:  LOPRESSOR      Take 1 tablet by mouth Every 12 (Twelve) Hours.       prasugrel 10 MG tablet  Commonly known as:  EFFIENT      Take 1 tablet by mouth Daily.       VITAMIN D PO      Take 5,000 Int'l Units by mouth Daily. 2 tablets daily             Where to Get Your Medications      These medications were sent to [facility] [address] [place] [address] - [address] [facility] - [phone]  - [phone]   [address] [facility] [address]    Phone:  [phone]   · losartan 50 MG tablet         The above medication changes may not have been made by this provider.  Medication  list was updated to reflect medications patient is currently taking including medication changes and discontinuations made by other healthcare providers.       I have reviewed this case with Dr. Booker. It has been a pleasure to participate in this patient's care. Please feel free to contact me with any questions or concerns.     Louisa Navas, APRN  01/04/2019       Dictated utilizing Dragon Dictation System.

## 2019-02-20 ENCOUNTER — OFFICE VISIT (OUTPATIENT)
Dept: CARDIOLOGY | Facility: CLINIC | Age: 59
End: 2019-02-20

## 2019-02-20 VITALS
SYSTOLIC BLOOD PRESSURE: 148 MMHG | WEIGHT: 241 LBS | BODY MASS INDEX: 30.93 KG/M2 | HEART RATE: 58 BPM | DIASTOLIC BLOOD PRESSURE: 94 MMHG | HEIGHT: 74 IN | RESPIRATION RATE: 18 BRPM

## 2019-02-20 DIAGNOSIS — I77.89 ASCENDING AORTA ENLARGEMENT (HCC): ICD-10-CM

## 2019-02-20 DIAGNOSIS — I21.21 ST ELEVATION MYOCARDIAL INFARCTION INVOLVING LEFT CIRCUMFLEX CORONARY ARTERY (HCC): Primary | ICD-10-CM

## 2019-02-20 DIAGNOSIS — E78.5 HYPERLIPIDEMIA LDL GOAL <70: ICD-10-CM

## 2019-02-20 DIAGNOSIS — I10 ESSENTIAL HYPERTENSION: ICD-10-CM

## 2019-02-20 DIAGNOSIS — I25.118 CORONARY ARTERY DISEASE OF NATIVE ARTERY OF NATIVE HEART WITH STABLE ANGINA PECTORIS (HCC): ICD-10-CM

## 2019-02-20 DIAGNOSIS — Z95.5 S/P DRUG ELUTING CORONARY STENT PLACEMENT: ICD-10-CM

## 2019-02-20 PROCEDURE — 99214 OFFICE O/P EST MOD 30 MIN: CPT | Performed by: NURSE PRACTITIONER

## 2019-02-20 PROCEDURE — 93000 ELECTROCARDIOGRAM COMPLETE: CPT | Performed by: NURSE PRACTITIONER

## 2019-02-20 RX ORDER — LOSARTAN POTASSIUM 50 MG/1
50 TABLET ORAL DAILY
Qty: 30 TABLET | Refills: 2 | Status: SHIPPED | OUTPATIENT
Start: 2019-02-20 | End: 2019-05-16 | Stop reason: SDUPTHER

## 2019-03-19 ENCOUNTER — OFFICE VISIT (OUTPATIENT)
Dept: CARDIOLOGY | Facility: CLINIC | Age: 59
End: 2019-03-19

## 2019-03-19 VITALS
HEART RATE: 59 BPM | WEIGHT: 237 LBS | SYSTOLIC BLOOD PRESSURE: 136 MMHG | BODY MASS INDEX: 30.42 KG/M2 | HEIGHT: 74 IN | DIASTOLIC BLOOD PRESSURE: 86 MMHG

## 2019-03-19 DIAGNOSIS — I25.118 CORONARY ARTERY DISEASE OF NATIVE ARTERY OF NATIVE HEART WITH STABLE ANGINA PECTORIS (HCC): Primary | ICD-10-CM

## 2019-03-19 PROCEDURE — 99213 OFFICE O/P EST LOW 20 MIN: CPT | Performed by: INTERNAL MEDICINE

## 2019-03-19 RX ORDER — METOPROLOL SUCCINATE 25 MG/1
12.5 TABLET, EXTENDED RELEASE ORAL DAILY
Qty: 30 TABLET | Refills: 3 | Status: SHIPPED | OUTPATIENT
Start: 2019-03-19 | End: 2019-09-13 | Stop reason: SDUPTHER

## 2019-03-19 NOTE — PROGRESS NOTES
Subjective:     Encounter Date: 03/19/19        Patient ID: [email or web address] Gomez Jr. is a 59 y.o. male.    Chief Complaint:  This is a 58 year old male with history of HTN, HLD, & hypothyroidism who came into the hospital 2 days ago for chest pain and hypertension. His chest pain radiated into both arms and his upper abdomen. EKG ruled him in for STEMI and he underwent cardiac catheterization which found occluded circumflex s/p REMA. He also had severe mid LAD stenosis- but this will be addressed an an outpatient. He was started on a regimen of ASA, Effient,Lipitor, Lisinopril, and Metoprolol. An echocardiogram was done which found normal LV function with EF of 53%.      When he returned for his follow-up appointment after the initial STEMI he continued to complain of exertional dyspnea.  For that reason I took him back to the cardiac catheterization lab to intervene on his LAD.  This procedure was complicated by a proximal LAD dissection.  He ended up with 3 overlapping drug-eluting stents in the proximal to mid LAD.  He was watched overnight and ended up with a good result.  Today he returns in great spirits and in good health.  He has been exercising 3-4 times a week.  He does weights at the gym and then he walks 3 miles an hour to 6% incline.  He has no chest pain or shortness of breath.  He is back to his normal work at a .  He has been riding his very large motorcycle without any issues.  He wears a helmet.      Cardiac Catheterization on 12/26/18-  1. HEMODYNAMICS:  /50, /80.     2. LEFT VENTRICULOGRAPHY:  LVEDP 10mmHg     3. CORONARY ANGIOGRAPHY:   Left main: Ectatic, very large let main. Bifurcates in to the LAD and circumflex.   LAD: The LAD is large caliber in the proximal vessel and tapers to medium caliber in mid and distal segments. There is at least moderate disease in the proximal vessel. The first diagonal is medium caliber and has mild ostial stenosis. The mid LAD has a  70-80% stenosis just at the bifurcation of the 2nd diagonal (1,0,0 lesion). The second diagonal has a mild to moderate mid-vessel stenosis. The distal LAD is medium caliber and is essentially normal and wraps the apex.  Left circumflex: The circumflex is totally occluded in the proximal segment.   RCA: The RCA is ectatic and tortuous in the proximal and mid segments. The distal vessel is large caliber is normal. The rPDA and PLBs are medium caliber and normal.           Left heart catheterization February 13, 2019      FINDINGS:     1.  CORONARY ANGIOGRAPHY:   Left main: Large caliber left main coronary artery without disease.  It bifurcates into a circumflex and LAD.  LAD: The proximal LAD is large caliber.  There is a severe, calcified 75-80% lesion in the proximal LAD.  The proximal LAD is very tortuous.  There is a short normal section of mid LAD at the level of the first septal.  Just after that is a very tight 90% mid LAD lesion at the level of the bifurcation of the second diagonal.  The rest of the mid and distal LAD tapers from medium to small caliber.  The first diagonal is a medium caliber vessel which is tortuous and essentially normal.  The second diagonal is a small caliber vessel which bifurcates and is essentially normal.  Left circumflex: The circumflex is a medium caliber vessel which extends into a medium caliber OM 3.  The previously placed proximal stent is widely patent.  RCA: Not visualized     4. CORONARY INTERVENTION FINDINGS:  PCI CORONARY SEGMENT: proximal to mid LAD  PRE-STENOSIS: 80-90%  POST-STENOSIS: 0%  LESION TYPE: C  LOUISA FLOW PRE/POST: II/III  CULPRIT LESION: yes  DISSECTION:   yes     SUMMARY: Successful PCI of the proximal to mid LAD using 3 drug-eluting stents.  The case was complicated by a proximal LAD dissection which was not flow-limiting and repaired with stenting.     RECOMMENDATIONS: Indefinite DAPT.            Coronary Artery Disease   Pertinent negatives include no  chest pain, leg swelling, palpitations or shortness of breath.       The following portions of the patient's history were reviewed and updated as appropriate: allergies, current medications, past family history, past medical history, past social history, past surgical history and problem list.    Past Medical History:   Diagnosis Date   • A-fib (CMS/[facility])    • CAD (coronary artery disease)    • Colon polyp    • Colorblind    • Coronary artery disease involving native coronary artery of native heart with angina pectoris (CMS/HCC) 2019   • Ex-smoker    • Hyperlipidemia LDL goal <70 2019   • Hypertension    • Hypothyroidism    • ST elevation myocardial infarction (STEMI) (CMS/[facility]) 2018   • Testosterone deficiency    • Ventricular fibrillation (CMS/HCC)        Family History   Problem Relation Age of Onset   • Arthritis Mother    • No Known Problems Father    • No Known Problems Brother    • No Known Problems Daughter        Social History     Socioeconomic History   • Marital status:      Spouse name: Not on file   • Number of children: Not on file   • Years of education: Not on file   • Highest education level: Not on file   Tobacco Use   • Smoking status: Former Smoker     Types: Cigars     Last attempt to quit: 2010     Years since quittin.2   • Smokeless tobacco: Never Used   • Tobacco comment: Caffeine - coffee 3 daily,   Substance and Sexual Activity   • Alcohol use: Yes     Frequency: Never     Comment: occassional/less than 2oz per month    • Drug use: No   • Sexual activity: Yes     Partners: Female       Allergies   Allergen Reactions   • Meperidine Anaphylaxis     Per patient's wife, he stopped breathing   • Hydrocodone-Acetaminophen Hallucinations   • Oxycodone-Acetaminophen Hallucinations       Past Surgical History:   Procedure Laterality Date   • ADENOIDECTOMY     • BACK SURGERY     • CARDIAC CATHETERIZATION N/A 2018    Procedure: Left Heart Cath;  Surgeon: Анна Booker  MD;  Location:  [facility];  Service: Cardiovascular   • CARDIAC CATHETERIZATION N/A 12/26/2018    Procedure: Coronary angiography;  Surgeon: Анна Booker MD;  Location:  [facility];  Service: Cardiovascular   • CARDIAC CATHETERIZATION N/A 12/26/2018    Procedure: Stent REMA coronary;  Surgeon: Анна Booker MD;  Location:  [facility];  Service: Cardiovascular   • CARDIAC CATHETERIZATION  12/26/2018    Procedure: Percutaneous Mechanical Thrombectomy;  Surgeon: Анна Booker MD;  Location:  [facility];  Service: Cardiovascular   • CARDIAC CATHETERIZATION N/A 2/13/2019    Procedure: Coronary angiography;  Surgeon: Анна Booker MD;  Location:  [facility];  Service: Cardiology   • CARDIAC CATHETERIZATION N/A 2/13/2019    Procedure: Stent REMA coronary;  Surgeon: Анна Booker MD;  Location:  [facility];  Service: Cardiology   • CARDIAC CATHETERIZATION N/A 2/13/2019    Procedure: Left Heart Cath;  Surgeon: Анна Booker MD;  Location: [facility] CATH [facility];  Service: Cardiology   • CARDIAC CATHETERIZATION N/A 2/13/2019    Procedure: Left ventriculography;  Surgeon: Анна Booker MD;  Location: [facility] CATH INVASIVE LOC[facility];  Service: Cardiology   • CARDIAC ELECTROPHYSIOLOGY STUDY AND ABLATION     • COLONOSCOPY     • COLONOSCOPY N/A 6/26/2017    Procedure: COLONOSCOPY AT BEDSIDE;  Surgeon: Fadi Gallagher MD;  Location: [facility] ENDOSCOPY;  Service:    • CORONARY ANGIOPLASTY     • EXTERNAL EAR SURGERY     • HAND SURGERY Left    • INGUINAL HERNIA REPAIR      X3   • MANDIBLE SURGERY     • NOSE SURGERY     • SC RT/LT HEART CATHETERS N/A 12/26/2018    Procedure: Percutaneous Coronary Intervention;  Surgeon: Анна Booker MD;  Location:  [facility] INVAS[facility];  Service: Cardiovascular   • SHOULDER SURGERY Bilateral    • THYROIDECTOMY     • TONSILLECTOMY         Review of Systems   Constitution:  Positive for weight loss. Negative for malaise/fatigue.   Cardiovascular: Negative for chest pain, dyspnea on exertion, irregular heartbeat, leg swelling, near-syncope, orthopnea, palpitations, paroxysmal nocturnal dyspnea and syncope.   Respiratory: Negative for shortness of breath.    Genitourinary: Positive for decreased libido.       Procedures       Objective:     Physical Exam  GEN: no distress, alert and oriented  Lungs CTAB, no rales or wheezes  Chest: no abnormalities  Heart: RRR, no murmurs  Abdo: soft,  Nontender, nondistended  Extr: no edema, +2 DP and 2+ carotid pulses b/l  Skin: no rash or bruising  Psych: organized thought, normal behavior and affect    Lab Review:         Assessment:         No diagnosis found.       Plan:         1.Coronary Artery Disease  Assessment  • The patient has no angina  • Mr. Gomez had a STEMI on December 26, 2018 of the circumflex had drug-eluting stent placed to it at that time.  He then had a staged procedure PCI of the proximal to mid LAD in February 2019.  He is currently asymptomatic.  He does have erectile dysfunction and decreased libido related to his beta-blockade.  I will reduce his metoprolol to 12-1/2 daily.  He does not have any further coronary disease so if he continues to have these issues we can stop the beta-blockade and switch him to a calcium channel blocker for blood pressure control.    Subjective - Objective  • Current antiplatelet therapy includes aspirin 81 mg and prasugrel 10 mg      2. Hypothyroid: managed by Dr Gutierrez. On Syntrhoid. Advised to avoid testosterone replacment.   3. Obesity : has lost 10 lbs with diet changes    I will see him back in 6 months.        Анна Booker MD  03/19/19

## 2019-04-25 ENCOUNTER — OFFICE VISIT (OUTPATIENT)
Dept: FAMILY MEDICINE CLINIC | Facility: CLINIC | Age: 59
End: 2019-04-25

## 2019-04-25 VITALS
WEIGHT: 237.2 LBS | HEIGHT: 74 IN | OXYGEN SATURATION: 97 % | TEMPERATURE: 98.3 F | DIASTOLIC BLOOD PRESSURE: 90 MMHG | BODY MASS INDEX: 30.44 KG/M2 | SYSTOLIC BLOOD PRESSURE: 128 MMHG | RESPIRATION RATE: 19 BRPM | HEART RATE: 80 BPM

## 2019-04-25 DIAGNOSIS — I10 ESSENTIAL HYPERTENSION: ICD-10-CM

## 2019-04-25 DIAGNOSIS — E89.0 POSTOPERATIVE HYPOTHYROIDISM: ICD-10-CM

## 2019-04-25 DIAGNOSIS — E78.5 HYPERLIPIDEMIA LDL GOAL <70: ICD-10-CM

## 2019-04-25 DIAGNOSIS — Z00.00 ANNUAL PHYSICAL EXAM: Primary | ICD-10-CM

## 2019-04-25 DIAGNOSIS — Z12.5 SCREENING FOR PROSTATE CANCER: ICD-10-CM

## 2019-04-25 DIAGNOSIS — E29.1 HYPOGONADISM IN MALE: ICD-10-CM

## 2019-04-25 DIAGNOSIS — Z95.5 S/P DRUG ELUTING CORONARY STENT PLACEMENT: ICD-10-CM

## 2019-04-25 DIAGNOSIS — E66.09 CLASS 1 OBESITY DUE TO EXCESS CALORIES WITH SERIOUS COMORBIDITY AND BODY MASS INDEX (BMI) OF 30.0 TO 30.9 IN ADULT: ICD-10-CM

## 2019-04-25 PROCEDURE — 99213 OFFICE O/P EST LOW 20 MIN: CPT | Performed by: FAMILY MEDICINE

## 2019-04-25 PROCEDURE — 99396 PREV VISIT EST AGE 40-64: CPT | Performed by: FAMILY MEDICINE

## 2019-04-26 LAB
ALBUMIN SERPL-MCNC: 4.8 G/DL (ref 3.5–5.2)
ALBUMIN/GLOB SERPL: 2 G/DL
ALP SERPL-CCNC: 99 U/L (ref 39–117)
ALT SERPL-CCNC: 34 U/L (ref 1–41)
AST SERPL-CCNC: 18 U/L (ref 1–40)
BASOPHILS # BLD AUTO: 0.06 10*3/MM3 (ref 0–0.2)
BASOPHILS NFR BLD AUTO: 0.9 % (ref 0–1.5)
BILIRUB SERPL-MCNC: 0.4 MG/DL (ref 0.2–1.2)
BUN SERPL-MCNC: 17 MG/DL (ref 6–20)
BUN/CREAT SERPL: 17.5 (ref 7–25)
CALCIUM SERPL-MCNC: 9.8 MG/DL (ref 8.6–10.5)
CHLORIDE SERPL-SCNC: 106 MMOL/L (ref 98–107)
CHOLEST SERPL-MCNC: 130 MG/DL (ref 0–200)
CO2 SERPL-SCNC: 23.4 MMOL/L (ref 22–29)
CREAT SERPL-MCNC: 0.97 MG/DL (ref 0.76–1.27)
EOSINOPHIL # BLD AUTO: 0.07 10*3/MM3 (ref 0–0.4)
EOSINOPHIL NFR BLD AUTO: 1.1 % (ref 0.3–6.2)
ERYTHROCYTE [DISTWIDTH] IN BLOOD BY AUTOMATED COUNT: 13.1 % (ref 12.3–15.4)
GLOBULIN SER CALC-MCNC: 2.4 GM/DL
GLUCOSE SERPL-MCNC: 100 MG/DL (ref 65–99)
HCT VFR BLD AUTO: 47.2 % (ref 37.5–51)
HDLC SERPL-MCNC: 41 MG/DL (ref 40–60)
HGB BLD-MCNC: 15.8 G/DL (ref 13–17.7)
IMM GRANULOCYTES # BLD AUTO: 0.02 10*3/MM3 (ref 0–0.05)
IMM GRANULOCYTES NFR BLD AUTO: 0.3 % (ref 0–0.5)
LDLC SERPL CALC-MCNC: 53 MG/DL (ref 0–100)
LYMPHOCYTES # BLD AUTO: 2.29 10*3/MM3 (ref 0.7–3.1)
LYMPHOCYTES NFR BLD AUTO: 34.5 % (ref 19.6–45.3)
MCH RBC QN AUTO: 32.2 PG (ref 26.6–33)
MCHC RBC AUTO-ENTMCNC: 33.5 G/DL (ref 31.5–35.7)
MCV RBC AUTO: 96.1 FL (ref 79–97)
MONOCYTES # BLD AUTO: 0.65 10*3/MM3 (ref 0.1–0.9)
MONOCYTES NFR BLD AUTO: 9.8 % (ref 5–12)
NEUTROPHILS # BLD AUTO: 3.54 10*3/MM3 (ref 1.7–7)
NEUTROPHILS NFR BLD AUTO: 53.4 % (ref 42.7–76)
NRBC BLD AUTO-RTO: 0.2 /100 WBC (ref 0–0.2)
PLATELET # BLD AUTO: 234 10*3/MM3 (ref 140–450)
POTASSIUM SERPL-SCNC: 4 MMOL/L (ref 3.5–5.2)
PROT SERPL-MCNC: 7.2 G/DL (ref 6–8.5)
PSA SERPL-MCNC: 2.49 NG/ML (ref 0–4)
RBC # BLD AUTO: 4.91 10*6/MM3 (ref 4.14–5.8)
SODIUM SERPL-SCNC: 142 MMOL/L (ref 136–145)
TESTOST SERPL-MCNC: 423 NG/DL (ref 264–916)
TRIGL SERPL-MCNC: 182 MG/DL (ref 0–150)
VLDLC SERPL CALC-MCNC: 36.4 MG/DL
WBC # BLD AUTO: 6.63 10*3/MM3 (ref 3.4–10.8)

## 2019-05-02 ENCOUNTER — OFFICE VISIT (OUTPATIENT)
Dept: ENDOCRINOLOGY | Age: 59
End: 2019-05-02

## 2019-05-02 VITALS
WEIGHT: 234.7 LBS | HEIGHT: 74 IN | BODY MASS INDEX: 30.12 KG/M2 | SYSTOLIC BLOOD PRESSURE: 130 MMHG | HEART RATE: 64 BPM | DIASTOLIC BLOOD PRESSURE: 80 MMHG

## 2019-05-02 DIAGNOSIS — E89.0 POSTOPERATIVE HYPOTHYROIDISM: ICD-10-CM

## 2019-05-02 DIAGNOSIS — R63.5 ABNORMAL WEIGHT GAIN: ICD-10-CM

## 2019-05-02 DIAGNOSIS — E78.5 HYPERLIPIDEMIA LDL GOAL <70: ICD-10-CM

## 2019-05-02 DIAGNOSIS — E89.0 POSTABLATIVE HYPOTHYROIDISM: Primary | ICD-10-CM

## 2019-05-02 DIAGNOSIS — E29.1 HYPOGONADISM IN MALE: ICD-10-CM

## 2019-05-02 LAB
T4 FREE SERPL-MCNC: 1.37 NG/DL (ref 0.93–1.7)
TSH SERPL DL<=0.005 MIU/L-ACNC: 1.95 MIU/ML (ref 0.27–4.2)

## 2019-05-02 PROCEDURE — 99214 OFFICE O/P EST MOD 30 MIN: CPT | Performed by: INTERNAL MEDICINE

## 2019-05-02 RX ORDER — LEVOTHYROXINE SODIUM 0.15 MG/1
150 TABLET ORAL DAILY
Qty: 90 TABLET | Refills: 1 | Status: SHIPPED | OUTPATIENT
Start: 2019-05-02 | End: 2019-09-23 | Stop reason: SDUPTHER

## 2019-05-02 NOTE — PROGRESS NOTES
59 y.o.    Patient Care Team:  Joan Perez MD as PCP - General (Family Medicine)    Chief Complaint:      F/U FOR HYPOTHYROIDISM.  NO RECENT LABS.  Subjective     HPI   Patient is a 59-year-old white male with a past history of hypothyroidism postoperative, hypogonadism came for follow-up    Postoperative hypothyroidism  Patient had total thyroidectomy in the year 2000  He had been on replacement ever since  He is currently on 150 mcg levothyroxine daily  He reports compliance with the medication he denies any side effects  He denies any symptoms suggestive of hyper or hypothyroidism  Male hypogonadism  Patient used to be on testosterone products including DHEA from the [facility]  Subsequently he had an MI and was admitted to the hospital as needed coronary stenting for STEMI  Since then he stopped using all products from that clinic  Patient is still able to exercise and feels reasonable amount of energy  Abnormal weight gain  Patient is actually losing weight slowly  He lost an additional 7 pounds in the past 3 months  He intends to lose weight steadily for the rest of the year      Interval History    Patient is a 58-year-old white male admitted to the hospital with chest pain and was evaluated by cardiology and he underwent cardiac catheterization with stent placement for STEMI  Patient has history of atrial fibrillation as well as ventricular fibrillation in the past     Patient reported that he had thyroidectomy 2009 and has been on levothyroxine until 2 or 3 years ago when he started going to [facility] an establishment that prescribes a combination of T3 plus T4, DHEA, testosterone products for health benefits     Patient reported that he was given testosterone injections twice weekly and he recalls his levels are usually greater than 1000  He was also advised to change his levothyroxine   He was placed on a combination medication of T3 plus T4 40 µg/114 µg daily dose  Patient  reported compliance with the medication     Since the heart attack he started rethinking his options and would like to be placed back on levothyroxine.  He also realizes that he may not be able to take testosterone any longer since several doctors right from the emergency room on [facility] advised him to discontinue        The following portions of the patient's history were reviewed and updated as appropriate: allergies, current medications, past family history, past medical history, past social history, past surgical history and problem list.    Past Medical History:   Diagnosis Date   • A-fib (CMS/HCC)    • CAD (coronary artery disease)    • Colon polyp    • Colorblind    • Coronary artery disease involving native coronary artery of native heart with angina pectoris (CMS/HCC) 1/2/2019   • Ex-smoker    • Hyperlipidemia LDL goal <70 1/2/2019   • Hypertension    • Hypothyroidism    • ST elevation myocardial infarction (STEMI) (CMS/HCC) 12/26/2018   • Testosterone deficiency    • Ventricular fibrillation (CMS/HCC)      Family History   Problem Relation Age of Onset   • Arthritis Mother    • No Known Problems Father    • No Known Problems Brother    • No Known Problems Daughter      Social History     Socioeconomic History   • Marital status:      Spouse name: Not on file   • Number of children: Not on file   • Years of education: Not on file   • Highest education level: Not on file   Tobacco Use   • Smoking status: Former Smoker     Types: Cigars     Last attempt to quit: 1/1/2009     Years since quitting: 10.3   • Smokeless tobacco: Never Used   • Tobacco comment: Caffeine - coffee 3 daily,   Substance and Sexual Activity   • Alcohol use: Yes     Frequency: Monthly or less     Comment: occassional/less than 2oz per month    • Drug use: No   • Sexual activity: Yes     Partners: Female     Allergies   Allergen Reactions   • Meperidine Anaphylaxis     Per patient's wife, he stopped breathing   •  "Hydrocodone-Acetaminophen Hallucinations   • Oxycodone-Acetaminophen Hallucinations       Current Outpatient Medications:   •  aspirin 81 MG chewable tablet, Chew 1 tablet Daily., Disp: , Rfl:   •  atorvastatin (LIPITOR) 80 MG tablet, Take 1 tablet by mouth Every Night., Disp: 30 tablet, Rfl: 11  •  Cholecalciferol (VITAMIN D PO), Take 5,000 Int'l Units by mouth Daily. 2 tablets daily, Disp: , Rfl:   •  levothyroxine (SYNTHROID, LEVOTHROID) 150 MCG tablet, Take 1 tablet by mouth Daily., Disp: 90 tablet, Rfl: 1  •  losartan (COZAAR) 50 MG tablet, Take 1 tablet by mouth Daily., Disp: 30 tablet, Rfl: 2  •  metoprolol succinate XL (TOPROL-XL) 25 MG 24 hr tablet, Take 0.5 tablets by mouth Daily., Disp: 30 tablet, Rfl: 3  •  prasugrel (EFFIENT) 10 MG tablet, Take 1 tablet by mouth Daily., Disp: 30 tablet, Rfl: 11        Review of Systems   Constitutional: Negative for chills, fatigue and fever.   Cardiovascular: Negative for chest pain and palpitations.   Gastrointestinal: Negative for abdominal pain, constipation, diarrhea, nausea and vomiting.   Endocrine: Negative for cold intolerance and heat intolerance.   All other systems reviewed and are negative.      Objective       Vitals:    05/02/19 0859   BP: 130/80   Pulse: 64   Weight: 106 kg (234 lb 11.2 oz)   Height: 188 cm (74\")     Body mass index is 30.13 kg/m².      Physical Exam   Constitutional: He is oriented to person, place, and time.   HENT:   Head: Normocephalic and atraumatic.   Eyes: EOM are normal. Pupils are equal, round, and reactive to light.   Neck: Normal range of motion. Neck supple. No thyromegaly present.   Cardiovascular: Normal rate, regular rhythm, normal heart sounds and intact distal pulses.   Pulmonary/Chest: Effort normal and breath sounds normal.   Abdominal: Soft. Bowel sounds are normal.   Musculoskeletal: Normal range of motion. He exhibits no edema.   Neurological: He is alert and oriented to person, place, and time.   Skin: Skin is warm " and dry.   Psychiatric: He has a normal mood and affect. His behavior is normal.   Nursing note and vitals reviewed.    Results Review:     I reviewed the patient's new clinical results.    Medical records reviewed  Summary:      Office Visit on 04/25/2019   Component Date Value Ref Range Status   • WBC 04/25/2019 6.63  3.40 - 10.80 10*3/mm3 Final   • RBC 04/25/2019 4.91  4.14 - 5.80 10*6/mm3 Final   • Hemoglobin 04/25/2019 15.8  13.0 - 17.7 g/dL Final   • Hematocrit 04/25/2019 47.2  37.5 - 51.0 % Final   • MCV 04/25/2019 96.1  79.0 - 97.0 fL Final   • MCH 04/25/2019 32.2  26.6 - 33.0 pg Final   • MCHC 04/25/2019 33.5  31.5 - 35.7 g/dL Final   • RDW 04/25/2019 13.1  12.3 - 15.4 % Final   • Platelets 04/25/2019 234  140 - 450 10*3/mm3 Final   • Neutrophil Rel % 04/25/2019 53.4  42.7 - 76.0 % Final   • Lymphocyte Rel % 04/25/2019 34.5  19.6 - 45.3 % Final   • Monocyte Rel % 04/25/2019 9.8  5.0 - 12.0 % Final   • Eosinophil Rel % 04/25/2019 1.1  0.3 - 6.2 % Final   • Basophil Rel % 04/25/2019 0.9  0.0 - 1.5 % Final   • Neutrophils Absolute 04/25/2019 3.54  1.70 - 7.00 10*3/mm3 Final   • Lymphocytes Absolute 04/25/2019 2.29  0.70 - 3.10 10*3/mm3 Final   • Monocytes Absolute 04/25/2019 0.65  0.10 - 0.90 10*3/mm3 Final   • Eosinophils Absolute 04/25/2019 0.07  0.00 - 0.40 10*3/mm3 Final   • Basophils Absolute 04/25/2019 0.06  0.00 - 0.20 10*3/mm3 Final   • Immature Granulocyte Rel % 04/25/2019 0.3  0.0 - 0.5 % Final   • Immature Grans Absolute 04/25/2019 0.02  0.00 - 0.05 10*3/mm3 Final   • nRBC 04/25/2019 0.2  0.0 - 0.2 /100 WBC Final   • Glucose 04/25/2019 100* 65 - 99 mg/dL Final   • BUN 04/25/2019 17  6 - 20 mg/dL Final   • Creatinine 04/25/2019 0.97  0.76 - 1.27 mg/dL Final   • eGFR Non African Am 04/25/2019 79  >60 mL/min/1.73 Final   • eGFR African Am 04/25/2019 96  >60 mL/min/1.73 Final   • BUN/Creatinine Ratio 04/25/2019 17.5  7.0 - 25.0 Final   • Sodium 04/25/2019 142  136 - 145 mmol/L Final   • Potassium  04/25/2019 4.0  3.5 - 5.2 mmol/L Final   • Chloride 04/25/2019 106  98 - 107 mmol/L Final   • Total CO2 04/25/2019 23.4  22.0 - 29.0 mmol/L Final   • Calcium 04/25/2019 9.8  8.6 - 10.5 mg/dL Final   • Total Protein 04/25/2019 7.2  6.0 - 8.5 g/dL Final   • Albumin 04/25/2019 4.80  3.50 - 5.20 g/dL Final   • Globulin 04/25/2019 2.4  gm/dL Final   • A/G Ratio 04/25/2019 2.0  g/dL Final   • Total Bilirubin 04/25/2019 0.4  0.2 - 1.2 mg/dL Final   • Alkaline Phosphatase 04/25/2019 99  39 - 117 U/L Final   • AST (SGOT) 04/25/2019 18  1 - 40 U/L Final   • ALT (SGPT) 04/25/2019 34  1 - 41 U/L Final   • Total Cholesterol 04/25/2019 130  0 - 200 mg/dL Final   • Triglycerides 04/25/2019 182* 0 - 150 mg/dL Final   • HDL Cholesterol 04/25/2019 41  40 - 60 mg/dL Final   • VLDL Cholesterol 04/25/2019 36.4  mg/dL Final   • LDL Cholesterol  04/25/2019 53  0 - 100 mg/dL Final   • PSA 04/25/2019 2.490  0.000 - 4.000 ng/mL Final   • Testosterone, Total 04/25/2019 423  264 - 916 ng/dL Final    Comment: Adult male reference interval is based on a population of  healthy nonobese males (BMI <30) between 19 and 39 years old.  [place] et.al. JCEM 2017,102;[id number]. PMID: [id number].       Lab Results   Component Value Date    HGBA1C 4.80 12/27/2018     Lab Results   Component Value Date    CREATININE 0.97 04/25/2019     Imaging Results (most recent)     None          Assessment and Plan:    Garrett was seen today for hypothyroidism and hypogonadism.    Diagnoses and all orders for this visit:    Postablative hypothyroidism  -     T4, Free  -     TSH    Postoperative hypothyroidism    Hypogonadism in male    Abnormal weight gain    Hyperlipidemia LDL goal <70    Other orders  -     levothyroxine (SYNTHROID, LEVOTHROID) 150 MCG tablet; Take 1 tablet by mouth Daily.      Patient reports that is consistent with his levothyroxine 150 mcg dose daily  He takes it on empty stomach every morning  He denies any symptoms of hyper or hypothyroidism  No  "side effects reported    Hypogonadism  Patient stopped taking testosterone products since discharge from the hospital  He is to go to [facility] again a commercial entity and used to receive all kinds of supplements  He stopped all of that after he had a heart attack and was hospitalized for treatment  Without using any testosterone products his testosterone level is 423 currently    Abnormal weight gain  Patient is steadily losing weight he lost nearly 7 pounds in the past 3 months  He currently weighs 234 pounds with a BMI of 30    Patient will get lab testing done today  After the lab results are reviewed he will be notified of any further changes in the dosage of medication  Patient return to follow-up in 5 months.    The total time spent  was more than 25 min of which greater than 15 min of time (greater than 50% of the total time)  was spent face to face with the patient counseling and coordination of care on recommended evaluation and treatment options, instructions for management/treatment and /or follow up and importance of compliance with chosen management or treatment options    Chato Gutierrez MD. FACE    05/02/19      EMR Dragon / transcription disclaimer:     \"Dictated utilizing Dragon dictation\".         "

## 2019-05-16 RX ORDER — LOSARTAN POTASSIUM 50 MG/1
TABLET ORAL
Qty: 30 TABLET | Refills: 2 | Status: SHIPPED | OUTPATIENT
Start: 2019-05-16 | End: 2019-08-15 | Stop reason: SDUPTHER

## 2019-05-31 ENCOUNTER — TELEPHONE (OUTPATIENT)
Dept: FAMILY MEDICINE CLINIC | Facility: CLINIC | Age: 59
End: 2019-05-31

## 2019-05-31 ENCOUNTER — TELEPHONE (OUTPATIENT)
Dept: CARDIOLOGY | Facility: CLINIC | Age: 59
End: 2019-05-31

## 2019-05-31 ENCOUNTER — TELEPHONE (OUTPATIENT)
Dept: INTERNAL MEDICINE | Facility: CLINIC | Age: 59
End: 2019-05-31

## 2019-05-31 NOTE — TELEPHONE ENCOUNTER
Pt called stating he needs letter from janeth about his BP going up while taking testosterone said that you told him you would do that if need before  his settlement can mail letter to him.

## 2019-06-04 NOTE — TELEPHONE ENCOUNTER
Follow up   Pt informed of message below and verbalized understanding with no questions. Labs and medication sent.    The only thing that pt is taking OTC is a cranberry supplement.

## 2019-07-19 ENCOUNTER — DOCUMENTATION (OUTPATIENT)
Dept: ENDOCRINOLOGY | Age: 59
End: 2019-07-19

## 2019-08-15 RX ORDER — LOSARTAN POTASSIUM 50 MG/1
TABLET ORAL
Qty: 90 TABLET | Refills: 0 | Status: SHIPPED | OUTPATIENT
Start: 2019-08-15 | End: 2019-11-29 | Stop reason: SDUPTHER

## 2019-09-11 ENCOUNTER — LAB (OUTPATIENT)
Dept: ENDOCRINOLOGY | Age: 59
End: 2019-09-11

## 2019-09-11 DIAGNOSIS — E89.0 POSTABLATIVE HYPOTHYROIDISM: ICD-10-CM

## 2019-09-11 DIAGNOSIS — E89.0 POSTABLATIVE HYPOTHYROIDISM: Primary | ICD-10-CM

## 2019-09-11 LAB
ALBUMIN SERPL-MCNC: 4.4 G/DL (ref 3.5–5.2)
ALBUMIN/GLOB SERPL: 2.1 G/DL
ALP SERPL-CCNC: 92 U/L (ref 39–117)
ALT SERPL-CCNC: 50 U/L (ref 1–41)
AST SERPL-CCNC: 23 U/L (ref 1–40)
BILIRUB SERPL-MCNC: 0.5 MG/DL (ref 0.2–1.2)
BUN SERPL-MCNC: 11 MG/DL (ref 6–20)
BUN/CREAT SERPL: 12 (ref 7–25)
CALCIUM SERPL-MCNC: 9.2 MG/DL (ref 8.6–10.5)
CHLORIDE SERPL-SCNC: 104 MMOL/L (ref 98–107)
CO2 SERPL-SCNC: 22.9 MMOL/L (ref 22–29)
CREAT SERPL-MCNC: 0.92 MG/DL (ref 0.76–1.27)
GLOBULIN SER CALC-MCNC: 2.1 GM/DL
GLUCOSE SERPL-MCNC: 101 MG/DL (ref 65–99)
POTASSIUM SERPL-SCNC: 4.4 MMOL/L (ref 3.5–5.2)
PROT SERPL-MCNC: 6.5 G/DL (ref 6–8.5)
SODIUM SERPL-SCNC: 141 MMOL/L (ref 136–145)
T4 FREE SERPL-MCNC: 1.41 NG/DL (ref 0.93–1.7)
TSH SERPL DL<=0.005 MIU/L-ACNC: 2.9 UIU/ML (ref 0.27–4.2)

## 2019-09-13 RX ORDER — METOPROLOL SUCCINATE 25 MG/1
12.5 TABLET, EXTENDED RELEASE ORAL DAILY
Qty: 30 TABLET | Refills: 3 | Status: SHIPPED | OUTPATIENT
Start: 2019-09-13 | End: 2020-02-27

## 2019-09-23 ENCOUNTER — OFFICE VISIT (OUTPATIENT)
Dept: ENDOCRINOLOGY | Age: 59
End: 2019-09-23

## 2019-09-23 ENCOUNTER — OFFICE VISIT (OUTPATIENT)
Dept: CARDIOLOGY | Facility: CLINIC | Age: 59
End: 2019-09-23

## 2019-09-23 ENCOUNTER — TELEPHONE (OUTPATIENT)
Dept: CARDIOLOGY | Facility: CLINIC | Age: 59
End: 2019-09-23

## 2019-09-23 VITALS
HEART RATE: 87 BPM | OXYGEN SATURATION: 98 % | DIASTOLIC BLOOD PRESSURE: 80 MMHG | HEIGHT: 74 IN | BODY MASS INDEX: 31.7 KG/M2 | WEIGHT: 247 LBS | SYSTOLIC BLOOD PRESSURE: 130 MMHG

## 2019-09-23 VITALS
HEIGHT: 74 IN | HEART RATE: 69 BPM | BODY MASS INDEX: 31.57 KG/M2 | SYSTOLIC BLOOD PRESSURE: 142 MMHG | DIASTOLIC BLOOD PRESSURE: 100 MMHG | WEIGHT: 246 LBS

## 2019-09-23 DIAGNOSIS — I25.10 CORONARY ARTERY DISEASE INVOLVING NATIVE CORONARY ARTERY OF NATIVE HEART WITHOUT ANGINA PECTORIS: Primary | ICD-10-CM

## 2019-09-23 DIAGNOSIS — E78.5 HYPERLIPIDEMIA LDL GOAL <70: ICD-10-CM

## 2019-09-23 DIAGNOSIS — E89.0 POSTOPERATIVE HYPOTHYROIDISM: Primary | ICD-10-CM

## 2019-09-23 DIAGNOSIS — E29.1 HYPOGONADISM IN MALE: ICD-10-CM

## 2019-09-23 DIAGNOSIS — R63.5 ABNORMAL WEIGHT GAIN: ICD-10-CM

## 2019-09-23 PROCEDURE — 93000 ELECTROCARDIOGRAM COMPLETE: CPT | Performed by: INTERNAL MEDICINE

## 2019-09-23 PROCEDURE — 99214 OFFICE O/P EST MOD 30 MIN: CPT | Performed by: INTERNAL MEDICINE

## 2019-09-23 PROCEDURE — 99213 OFFICE O/P EST LOW 20 MIN: CPT | Performed by: INTERNAL MEDICINE

## 2019-09-23 RX ORDER — SILDENAFIL 25 MG/1
25 TABLET, FILM COATED ORAL DAILY PRN
Qty: 10 TABLET | Refills: 3 | Status: SHIPPED | OUTPATIENT
Start: 2019-09-23 | End: 2019-11-20

## 2019-09-23 RX ORDER — LEVOTHYROXINE SODIUM 0.15 MG/1
150 TABLET ORAL DAILY
Qty: 90 TABLET | Refills: 1 | Status: SHIPPED | OUTPATIENT
Start: 2019-09-23 | End: 2020-05-08 | Stop reason: SDUPTHER

## 2019-09-23 NOTE — PROGRESS NOTES
Subjective:     Encounter Date: 09/23/19        Patient ID: Garrett Gomez Jr. is a 59 y.o. male.    Chief Complaint:  This is a 58 year old male with history of HTN, HLD, & hypothyroidism who came into the hospital in December 2018 for chest pain. EKG ruled him in for STEMI and he underwent cardiac catheterization which found occluded circumflex s/p REMA. He also had severe mid LAD stenosis, which was intervened on in early 2019 with 3 overlapping drug eluting stents to the proximal LAD.     He feels quite well.  He has no angina or dyspnea.  He is a , and works out multiple times per week.  He has not been exercising over the last month and is gained about 6 pounds due to upper respiratory tract infection.  He did exercise this morning, and says he felt great.  He likes to ride his motorcycle for fun and he has also written books on forensics.    He is currently considering a lawsuit against 26 again, who was prescribing him testosterone and thyroid supplementation prior to his MI.  He is being followed now by Dr. Gutierrez in endocrinology.    Cardiac Catheterization on 12/26/18-  1. HEMODYNAMICS:  /50, /80.     2. LEFT VENTRICULOGRAPHY:  LVEDP 10mmHg     3. CORONARY ANGIOGRAPHY:   Left main: Ectatic, very large let main. Bifurcates in to the LAD and circumflex.   LAD: The LAD is large caliber in the proximal vessel and tapers to medium caliber in mid and distal segments. There is at least moderate disease in the proximal vessel. The first diagonal is medium caliber and has mild ostial stenosis. The mid LAD has a 70-80% stenosis just at the bifurcation of the 2nd diagonal (1,0,0 lesion). The second diagonal has a mild to moderate mid-vessel stenosis. The distal LAD is medium caliber and is essentially normal and wraps the apex.  Left circumflex: The circumflex is totally occluded in the proximal segment.   RCA: The RCA is ectatic and tortuous in the proximal and mid segments. The  distal vessel is large caliber is normal. The rPDA and PLBs are medium caliber and normal.       Left heart catheterization February 13, 2019  1.  CORONARY ANGIOGRAPHY:   Left main: Large caliber left main coronary artery without disease.  It bifurcates into a circumflex and LAD.  LAD: The proximal LAD is large caliber.  There is a severe, calcified 75-80% lesion in the proximal LAD.  The proximal LAD is very tortuous.  There is a short normal section of mid LAD at the level of the first septal.  Just after that is a very tight 90% mid LAD lesion at the level of the bifurcation of the second diagonal.  The rest of the mid and distal LAD tapers from medium to small caliber.  The first diagonal is a medium caliber vessel which is tortuous and essentially normal.  The second diagonal is a small caliber vessel which bifurcates and is essentially normal.  Left circumflex: The circumflex is a medium caliber vessel which extends into a medium caliber OM 3.  The previously placed proximal stent is widely patent.  RCA: Not visualized     4. CORONARY INTERVENTION FINDINGS:  PCI CORONARY SEGMENT: proximal to mid LAD  PRE-STENOSIS: 80-90%  POST-STENOSIS: 0%  LESION TYPE: C  LOUISA FLOW PRE/POST: II/III  CULPRIT LESION: yes  DISSECTION:   yes     SUMMARY: Successful PCI of the proximal to mid LAD using 3 drug-eluting stents.  The case was complicated by a proximal LAD dissection which was not flow-limiting and repaired with stenting.     RECOMMENDATIONS: Indefinite DAPT.            Coronary Artery Disease   Pertinent negatives include no chest pain, leg swelling, palpitations or shortness of breath.     REVIEW OF SYSTEMS:   All systems reviewed.  Pertinent positives identified in HPI.  All other systems are negative.      The following portions of the patient's history were reviewed and updated as appropriate: allergies, current medications, past family history, past medical history, past social history, past surgical history and  problem list.    Past Medical History:   Diagnosis Date   • A-fib (CMS/[facility])    • CAD (coronary artery disease)    • Colon polyp    • Colorblind    • Coronary artery disease involving native coronary artery of native heart with angina pectoris (CMS/[facility]) 1/2/2019   • Ex-smoker    • Heart attack (CMS/[facility])    • Hyperlipidemia LDL goal <70 1/2/2019   • Hypertension    • Hypothyroidism    • ST elevation myocardial infarction (STEMI) (CMS/[facility]) 12/26/2018   • Testosterone deficiency    • Ventricular fibrillation (CMS/[facility])        Family History   Problem Relation Age of Onset   • Arthritis Mother    • No Known Problems Father    • No Known Problems Brother    • No Known Problems Daughter        Social History     Socioeconomic History   • Marital status:      Spouse name: Not on file   • Number of children: Not on file   • Years of education: Not on file   • Highest education level: Not on file   Tobacco Use   • Smoking status: Former Smoker     Types: Cigars     Last attempt to quit: 1/1/2009     Years since quitting: 10.7   • Smokeless tobacco: Never Used   • Tobacco comment: Caffeine - coffee 3 daily,   Substance and Sexual Activity   • Alcohol use: Yes     Frequency: Monthly or less     Comment: occassional/less than 2oz per month    • Drug use: No   • Sexual activity: Yes     Partners: Female       Allergies   Allergen Reactions   • Meperidine Anaphylaxis     Per patient's wife, he stopped breathing   • Hydrocodone-Acetaminophen Hallucinations   • Oxycodone-Acetaminophen Hallucinations       Past Surgical History:   Procedure Laterality Date   • ADENOIDECTOMY     • ANGIOPLASTY      STENT PLACEMENT    • BACK SURGERY     • CARDIAC CATHETERIZATION N/A 12/26/2018    Procedure: Left Heart Cath;  Surgeon: Анна Booker MD;  Location: [facility] INVASIVE LOCATION;  Service: Cardiovascular   • CARDIAC CATHETERIZATION N/A 12/26/2018    Procedure: Coronary angiography;  Surgeon: Анна Booker MD;  Location: [facility] CATH  INVASIVE LOCATION;  Service: Cardiovascular   • CARDIAC CATHETERIZATION N/A 12/26/2018    Procedure: Stent REMA coronary;  Surgeon: Анна Booker MD;  Location:  [facility] CATH INVASIVE LOCATION;  Service: Cardiovascular   • CARDIAC CATHETERIZATION  12/26/2018    Procedure: Percutaneous Mechanical Thrombectomy;  Surgeon: Анна Booker MD;  Location:  [facility] CATH INVASIVE LOCATION;  Service: Cardiovascular   • CARDIAC CATHETERIZATION N/A 2/13/2019    Procedure: Coronary angiography;  Surgeon: Анна Booker MD;  Location:  [facility] CATH INVASIVE LOCATION;  Service: Cardiology   • CARDIAC CATHETERIZATION N/A 2/13/2019    Procedure: Stent REMA coronary;  Surgeon: Анна Booker MD;  Location:  [facility] CATH INVASIVE LOCATION;  Service: Cardiology   • CARDIAC CATHETERIZATION N/A 2/13/2019    Procedure: Left Heart Cath;  Surgeon: Анна Booker MD;  Location:  [facility] CATH INVASIVE LOCATION;  Service: Cardiology   • CARDIAC CATHETERIZATION N/A 2/13/2019    Procedure: Left ventriculography;  Surgeon: Анна Booker MD;  Location:  [facility] CATH INVASIVE LOCATION;  Service: Cardiology   • CARDIAC ELECTROPHYSIOLOGY STUDY AND ABLATION     • COLONOSCOPY     • COLONOSCOPY N/A 6/26/2017    Procedure: COLONOSCOPY AT BEDSIDE;  Surgeon: Fadi Gallagher MD;  [facility];  Service:    • CORONARY ANGIOPLASTY     • EXTERNAL EAR SURGERY     • HAND SURGERY Left    • INGUINAL HERNIA REPAIR      X3   • MANDIBLE SURGERY     • NOSE SURGERY     • MT RT/LT HEART CATHETERS N/A 12/26/2018    Procedure: Percutaneous Coronary Intervention;  Surgeon: Анна Booker MD;  Location:  [facility] CATH INVASIVE LOCATION;  Service: Cardiovascular   • SHOULDER SURGERY Bilateral    • THYROIDECTOMY     • TONSILLECTOMY               ECG 12 Lead  Date/Time: 9/23/2019 1:44 PM  Performed by: Анна Booker MD  Authorized by: Анна Booker MD   Comparison: compared with previous ECG from 2/20/2019  Similar to previous ECG  Rhythm: sinus rhythm  Rate:  normal  Conduction: conduction normal  ST Segments: ST segments normal  T Waves: T waves normal  QRS axis: left    Clinical impression: non-specific ECG               Objective:     Physical Exam  GEN: no distress, alert and oriented  Lungs CTAB, no rales or wheezes  Chest: no abnormalities  Heart: RRR, no murmurs  Abdo: soft,  Nontender, nondistended  Extr: no edema, +2 DP and 2+ carotid pulses b/l  Skin: no rash or bruising  Psych: organized thought, normal behavior and affect    Outpatient Encounter Medications as of 9/23/2019   Medication Sig Dispense Refill   • aspirin 81 MG chewable tablet Chew 1 tablet Daily.     • atorvastatin (LIPITOR) 80 MG tablet Take 1 tablet by mouth Every Night. 30 tablet 11   • Cholecalciferol (VITAMIN D PO) Take 5,000 Int'l Units by mouth Daily. 2 tablets daily     • levothyroxine (SYNTHROID, LEVOTHROID) 150 MCG tablet Take 1 tablet by mouth Daily. 90 tablet 1   • losartan (COZAAR) 50 MG tablet TAKE 1 TABLET BY MOUTH ONCE DAILY 90 tablet 0   • metoprolol succinate XL (TOPROL-XL) 25 MG 24 hr tablet Take 0.5 tablets by mouth Daily. 30 tablet 3   • prasugrel (EFFIENT) 10 MG tablet Take 1 tablet by mouth Daily. 30 tablet 11   • [DISCONTINUED] azithromycin (ZITHROMAX Z-FRANK) 250 MG tablet Take 2 tablets the first day, then 1 tablet daily for 4 days. 6 tablet 0     No facility-administered encounter medications on file as of 9/23/2019.              Assessment:          Diagnosis Plan   1. Coronary artery disease involving native coronary artery of native heart without angina pectoris            Plan:         1.Coronary Artery Disease  Assessment  • The patient has no angina  • Mr. Gomez had a STEMI on December 26, 2018 of the circumflex had drug-eluting stent placed to it at that time.  He then had a staged procedure PCI of the proximal to mid LAD in February 2019.    Asymptomatic. Continue current meds.     Subjective - Objective  • Current antiplatelet therapy includes aspirin 81 mg  and prasugrel 10 mg      2. Hypothyroid: managed by Dr Gutierrez. On Syntrhoid. Advised to avoid testosterone replacment.   3. Obesity : has lost 10 lbs with diet changes  3. HTN: mildly elevated today. Will call in 2 weeks with BP at home.   4. Impotence/ED: will Rx Viagra. Does not take SLNTG.     I will see him back in 6 months. Dr. Perez, thank you very much for referring this kind patient to me. Please call with any questions or concerns.        Анна Booker MD  09/23/19

## 2019-09-23 NOTE — TELEPHONE ENCOUNTER
Pt called requesting a letter stating he is able to work with [facility], just without the Testerone.  Please advise    Pt can be reached at [phone]

## 2019-09-23 NOTE — PROGRESS NOTES
59 y.o.    Patient Care Team:  Joan Perez MD as PCP - General (Family Medicine)    Chief Complaint:    F/U FOR HYPOTHYROIDISM.  Subjective     HPI   Patient is a 59-year-old male with a history of hypothyroidism postoperative, hypogonadism came for follow-up    Postoperative hypothyroidism  Patient is currently taking levothyroxine 150 mcg daily.  He reports compliance with the medication denies any side effects  He denies any symptoms suggestive of hyper or hypothyroidism  Total thyroidectomy in the year 2000  Male hypogonadism  Patient stopped using all testosterone products since he had an MI and had stents placed several months ago  Abnormal weight gain  Patient currently weighs 247 pounds with a BMI of 31.8  He was not exercising for the past few months and has been gaining weight  He plans to exercise and lose weight in the next 4 months    Interval History    Patient is a 58-year-old white male admitted to the hospital with chest pain and was evaluated by cardiology and he underwent cardiac catheterization with stent placement for STEMI  Patient has history of atrial fibrillation as well as ventricular fibrillation in the past     Patient reported that he had thyroidectomy 2009 and has been on levothyroxine until 2 or 3 years ago when he started going to [facility] an establishment that prescribes a combination of T3 plus T4, DHEA, testosterone products for health benefits     Patient reported that he was given testosterone injections twice weekly and he recalls his levels are usually greater than 1000  He was also advised to change his levothyroxine   He was placed on a combination medication of T3 plus T4 40 µg/114 µg daily dose  Patient reported compliance with the medication     Since the heart attack he started rethinking his options and would like to be placed back on levothyroxine.  He also realizes that he may not be able to take testosterone any longer since several doctors right from  the emergency room [facility] [place] advised him to discontinue  The following portions of the patient's history were reviewed and updated as appropriate: allergies, current medications, past family history, past medical history, past social history, past surgical history and problem list.    Past Medical History:   Diagnosis Date   • A-fib (CMS/[facility])    • CAD (coronary artery disease)    • Colon polyp    • Colorblind    • Coronary artery disease involving native coronary artery of native heart with angina pectoris (CMS/HCC) 1/2/2019   • Ex-smoker    • Heart attack (CMS/HCC)    • Hyperlipidemia LDL goal <70 1/2/2019   • Hypertension    • Hypothyroidism    • ST elevation myocardial infarction (STEMI) (CMS/HCC) 12/26/2018   • Testosterone deficiency    • Ventricular fibrillation (CMS/HCC)      Family History   Problem Relation Age of Onset   • Arthritis Mother    • No Known Problems Father    • No Known Problems Brother    • No Known Problems Daughter      Social History     Socioeconomic History   • Marital status:      Spouse name: Not on file   • Number of children: Not on file   • Years of education: Not on file   • Highest education level: Not on file   Tobacco Use   • Smoking status: Former Smoker     Types: Cigars     Last attempt to quit: 1/1/2009     Years since quitting: 10.7   • Smokeless tobacco: Never Used   • Tobacco comment: Caffeine - coffee 3 daily,   Substance and Sexual Activity   • Alcohol use: Yes     Frequency: Monthly or less     Comment: occassional/less than 2oz per month    • Drug use: No   • Sexual activity: Yes     Partners: Female     Allergies   Allergen Reactions   • Meperidine Anaphylaxis     Per patient's wife, he stopped breathing   • Hydrocodone-Acetaminophen Hallucinations   • Oxycodone-Acetaminophen Hallucinations       Current Outpatient Medications:   •  aspirin 81 MG chewable tablet, Chew 1 tablet Daily., Disp: , Rfl:   •  atorvastatin (LIPITOR) 80 MG tablet, Take 1 tablet by mouth  "Every Night., Disp: 30 tablet, Rfl: 11  •  Cholecalciferol (VITAMIN D PO), Take 5,000 Int'l Units by mouth Daily. 2 tablets daily, Disp: , Rfl:   •  levothyroxine (SYNTHROID, LEVOTHROID) 150 MCG tablet, Take 1 tablet by mouth Daily., Disp: 90 tablet, Rfl: 1  •  losartan (COZAAR) 50 MG tablet, TAKE 1 TABLET BY MOUTH ONCE DAILY, Disp: 90 tablet, Rfl: 0  •  metoprolol succinate XL (TOPROL-XL) 25 MG 24 hr tablet, Take 0.5 tablets by mouth Daily., Disp: 30 tablet, Rfl: 3  •  prasugrel (EFFIENT) 10 MG tablet, Take 1 tablet by mouth Daily., Disp: 30 tablet, Rfl: 11  •  sildenafil (VIAGRA) 25 MG tablet, Take 1 tablet by mouth Daily As Needed for erectile dysfunction., Disp: 10 tablet, Rfl: 3        Review of Systems   Constitutional: Negative for appetite change, fatigue and fever.   Eyes: Negative for visual disturbance.   Respiratory: Negative for shortness of breath.    Cardiovascular: Negative for palpitations and leg swelling.   Gastrointestinal: Negative for abdominal pain and vomiting.   Endocrine: Negative for polydipsia and polyuria.   Musculoskeletal: Negative for joint swelling and neck pain.   Skin: Negative for rash.   Neurological: Negative for weakness and numbness.   Psychiatric/Behavioral: Negative for behavioral problems.   All other systems reviewed and are negative.      Objective       Vitals:    09/23/19 1333   BP: 130/80   Pulse: 87   SpO2: 98%   Weight: 112 kg (247 lb)   Height: 188 cm (74\")     Body mass index is 31.71 kg/m².      Physical Exam   Constitutional: He is oriented to person, place, and time.   HENT:   Head: Normocephalic and atraumatic.   Eyes: EOM are normal. Pupils are equal, round, and reactive to light.   Neck: Normal range of motion. Neck supple. No thyromegaly present.   Cardiovascular: Normal rate, regular rhythm, normal heart sounds and intact distal pulses.   Pulmonary/Chest: Effort normal and breath sounds normal.   Abdominal: Soft. Bowel sounds are normal.   Musculoskeletal: " Normal range of motion. He exhibits no edema.   Neurological: He is alert and oriented to person, place, and time.   Skin: Skin is warm and dry.   Psychiatric: He has a normal mood and affect. His behavior is normal.   Nursing note and vitals reviewed.    Results Review:     I reviewed the patient's new clinical results.    Medical records reviewed  Summary:      Lab on 09/11/2019   Component Date Value Ref Range Status   • TSH 09/11/2019 2.900  0.270 - 4.200 uIU/mL Final   • Free T4 09/11/2019 1.41  0.93 - 1.70 ng/dL Final   • Glucose 09/11/2019 101* 65 - 99 mg/dL Final   • BUN 09/11/2019 11  6 - 20 mg/dL Final   • Creatinine 09/11/2019 0.92  0.76 - 1.27 mg/dL Final   • eGFR Non  Am 09/11/2019 84  >60 mL/min/1.73 Final   • eGFR African Am 09/11/2019 102  >60 mL/min/1.73 Final   • BUN/Creatinine Ratio 09/11/2019 12.0  7.0 - 25.0 Final   • Sodium 09/11/2019 141  136 - 145 mmol/L Final   • Potassium 09/11/2019 4.4  3.5 - 5.2 mmol/L Final   • Chloride 09/11/2019 104  98 - 107 mmol/L Final   • Total CO2 09/11/2019 22.9  22.0 - 29.0 mmol/L Final   • Calcium 09/11/2019 9.2  8.6 - 10.5 mg/dL Final   • Total Protein 09/11/2019 6.5  6.0 - 8.5 g/dL Final   • Albumin 09/11/2019 4.40  3.50 - 5.20 g/dL Final   • Globulin 09/11/2019 2.1  gm/dL Final   • A/G Ratio 09/11/2019 2.1  g/dL Final   • Total Bilirubin 09/11/2019 0.5  0.2 - 1.2 mg/dL Final   • Alkaline Phosphatase 09/11/2019 92  39 - 117 U/L Final   • AST (SGOT) 09/11/2019 23  1 - 40 U/L Final   • ALT (SGPT) 09/11/2019 50* 1 - 41 U/L Final     Lab Results   Component Value Date    HGBA1C 4.80 12/27/2018     Lab Results   Component Value Date    CREATININE 0.92 09/11/2019     Imaging Results (most recent)     None          Assessment and Plan:    Diagnoses and all orders for this visit:    Postoperative hypothyroidism    Hypogonadism in male    Abnormal weight gain    Hyperlipidemia LDL goal <70    Other orders  -     levothyroxine (SYNTHROID, LEVOTHROID) 150 MCG  "tablet; Take 1 tablet by mouth Daily.       Postoperative hypothyroidism  Patient is currently taking levothyroxine 150 mcg daily  He takes the medication along with the vitamin D in the morning  Lab reports reviewed  TSH 2.9.  I advised the patient to take vitamin D at dinner  He must take the levothyroxine on empty stomach in the morning  Hypogonadism in male  Currently not on medication  Abnormal weight gain  Gained approximately 10 pounds since last year  He is restarting exercise and I also recommended low-carb diet approximately 30 g carb per meal for effective weight loss with exercise  Hyperlipidemia LDL goal <70  Patient is currently on Lipitor 80 mg daily.  Denies any side effects    Patient return to follow-up in 6 months    The total time spent  was more than 25 min of which greater than 15 min of time (greater than 50% of the total time)  was spent face to face with the patient counseling and coordination of care on recommended evaluation and treatment options, instructions for management/treatment and /or follow up and importance of compliance with chosen management or treatment options  Chato Gutierrez MD. FACE    09/23/19      ED Ferguson / transcription disclaimer:     \"Dictated utilizing Dragon dictation\".         "

## 2019-12-02 RX ORDER — LOSARTAN POTASSIUM 50 MG/1
TABLET ORAL
Qty: 90 TABLET | Refills: 3 | Status: SHIPPED | OUTPATIENT
Start: 2019-12-02 | End: 2019-12-04 | Stop reason: SDUPTHER

## 2019-12-04 RX ORDER — LOSARTAN POTASSIUM 50 MG/1
50 TABLET ORAL DAILY
Qty: 90 TABLET | Refills: 3 | Status: SHIPPED | OUTPATIENT
Start: 2019-12-04 | End: 2021-02-26

## 2020-01-07 RX ORDER — ATORVASTATIN CALCIUM 80 MG/1
TABLET, FILM COATED ORAL
Qty: 90 TABLET | Refills: 1 | Status: SHIPPED | OUTPATIENT
Start: 2020-01-07 | End: 2020-07-06

## 2020-01-13 RX ORDER — PRASUGREL 10 MG/1
TABLET, FILM COATED ORAL
Qty: 90 TABLET | Refills: 0 | Status: SHIPPED | OUTPATIENT
Start: 2020-01-13 | End: 2020-02-24

## 2020-02-24 ENCOUNTER — OFFICE VISIT (OUTPATIENT)
Dept: CARDIOLOGY | Facility: CLINIC | Age: 60
End: 2020-02-24

## 2020-02-24 ENCOUNTER — LAB (OUTPATIENT)
Dept: ENDOCRINOLOGY | Age: 60
End: 2020-02-24

## 2020-02-24 VITALS
HEART RATE: 60 BPM | WEIGHT: 251 LBS | HEIGHT: 74 IN | SYSTOLIC BLOOD PRESSURE: 132 MMHG | DIASTOLIC BLOOD PRESSURE: 90 MMHG | BODY MASS INDEX: 32.21 KG/M2

## 2020-02-24 DIAGNOSIS — I25.10 CORONARY ARTERY DISEASE INVOLVING NATIVE CORONARY ARTERY OF NATIVE HEART WITHOUT ANGINA PECTORIS: ICD-10-CM

## 2020-02-24 DIAGNOSIS — E78.5 HYPERLIPIDEMIA, UNSPECIFIED HYPERLIPIDEMIA TYPE: ICD-10-CM

## 2020-02-24 DIAGNOSIS — I10 ESSENTIAL HYPERTENSION: Primary | ICD-10-CM

## 2020-02-24 DIAGNOSIS — E89.0 POSTOPERATIVE HYPOTHYROIDISM: ICD-10-CM

## 2020-02-24 DIAGNOSIS — E89.0 POSTOPERATIVE HYPOTHYROIDISM: Primary | ICD-10-CM

## 2020-02-24 LAB
ALBUMIN SERPL-MCNC: 4.2 G/DL (ref 3.5–5.2)
ALBUMIN/GLOB SERPL: 2.1 G/DL
ALP SERPL-CCNC: 90 U/L (ref 39–117)
ALT SERPL-CCNC: 28 U/L (ref 1–41)
AST SERPL-CCNC: 17 U/L (ref 1–40)
BILIRUB SERPL-MCNC: 0.7 MG/DL (ref 0.2–1.2)
BUN SERPL-MCNC: 14 MG/DL (ref 6–20)
BUN/CREAT SERPL: 15.2 (ref 7–25)
CALCIUM SERPL-MCNC: 8.9 MG/DL (ref 8.6–10.5)
CHLORIDE SERPL-SCNC: 103 MMOL/L (ref 98–107)
CO2 SERPL-SCNC: 25.3 MMOL/L (ref 22–29)
CREAT SERPL-MCNC: 0.92 MG/DL (ref 0.76–1.27)
GLOBULIN SER CALC-MCNC: 2 GM/DL
GLUCOSE SERPL-MCNC: 86 MG/DL (ref 65–99)
POTASSIUM SERPL-SCNC: 4.1 MMOL/L (ref 3.5–5.2)
PROT SERPL-MCNC: 6.2 G/DL (ref 6–8.5)
SODIUM SERPL-SCNC: 139 MMOL/L (ref 136–145)
T4 FREE SERPL-MCNC: 1.4 NG/DL (ref 0.93–1.7)
TSH SERPL DL<=0.005 MIU/L-ACNC: 1.59 UIU/ML (ref 0.27–4.2)

## 2020-02-24 PROCEDURE — 99214 OFFICE O/P EST MOD 30 MIN: CPT | Performed by: INTERNAL MEDICINE

## 2020-02-24 PROCEDURE — 93000 ELECTROCARDIOGRAM COMPLETE: CPT | Performed by: INTERNAL MEDICINE

## 2020-02-24 RX ORDER — CLOPIDOGREL BISULFATE 75 MG/1
75 TABLET ORAL DAILY
Qty: 90 TABLET | Refills: 3 | Status: SHIPPED | OUTPATIENT
Start: 2020-02-24 | End: 2021-02-19

## 2020-02-24 RX ORDER — TADALAFIL 10 MG/1
10 TABLET ORAL DAILY PRN
Qty: 30 TABLET | Refills: 3 | Status: SHIPPED | OUTPATIENT
Start: 2020-02-24 | End: 2020-10-02 | Stop reason: ALTCHOICE

## 2020-02-24 NOTE — PROGRESS NOTES
Subjective:     Encounter Date: 02/24/20        Patient ID: Garrett Gomez Jr. is a 59 y.o. male.    Chief Complaint: CAD  This is a 59 year old male with history of HTN, HLD, & hypothyroidism who came into the hospital in December 2018 for chest pain. EKG ruled him in for STEMI and he underwent cardiac catheterization which found occluded circumflex. He received a 3x15mm Xience Cindy REMA to the proximal circumflex . He also had severe mid LAD stenosis, which was intervened on in early 2019 with 3 overlapping drug eluting stents to the proximal LAD.     He continues to work out regularly at the gym, lifting weights and walking. His HR doesn't get much higher than 120 despite his best efforts. He has gained some weight and he is unhappy about that. He feels tired, sluggish. He occasionally has brief palpitation lasting a few seconds, but is un-bothered by them. No angina or dyspnea.     He is currently considering a lawsuit against 26 again, who was prescribing him testosterone and thyroid supplementation prior to his MI.  He is being followed now by Dr. Gutierrez in endocrinology.    REVIEW OF SYSTEMS:   All systems reviewed.  Pertinent positives identified in HPI.  All other systems are negative.    The following portions of the patient's history were reviewed and updated as appropriate: allergies, current medications, past family history, past medical history, past social history, past surgical history and problem list.    Past Medical History:   Diagnosis Date   • A-fib (CMS/[facility])    • CAD (coronary artery disease)    • Colon polyp    • Colorblind    • Coronary artery disease involving native coronary artery of native heart with angina pectoris (CMS/[facility]) 1/2/2019   • Ex-smoker    • Heart attack (CMS/[facility])    • Hyperlipidemia LDL goal <70 1/2/2019   • Hypertension    • Hypothyroidism    • ST elevation myocardial infarction (STEMI) (CMS/[facility]) 12/26/2018   • Testosterone deficiency    • Ventricular  fibrillation (CMS/[facility])        Family History   Problem Relation Age of Onset   • Arthritis Mother    • No Known Problems Father    • No Known Problems Brother    • No Known Problems Daughter        Social History     Socioeconomic History   • Marital status:      Spouse name: Not on file   • Number of children: Not on file   • Years of education: Not on file   • Highest education level: Not on file   Tobacco Use   • Smoking status: Former Smoker     Types: Cigars     Last attempt to quit: 2009     Years since quittin.1   • Smokeless tobacco: Never Used   • Tobacco comment: Caffeine - coffee 3 daily,   Substance and Sexual Activity   • Alcohol use: Yes     Frequency: Monthly or less     Comment: occassional/less than 2oz per month    • Drug use: No   • Sexual activity: Yes     Partners: Female       Allergies   Allergen Reactions   • Meperidine Anaphylaxis     Per patient's wife, he stopped breathing   • Hydrocodone-Acetaminophen Hallucinations   • Oxycodone-Acetaminophen Hallucinations       Past Surgical History:   Procedure Laterality Date   • ADENOIDECTOMY     • ANGIOPLASTY      STENT PLACEMENT    • BACK SURGERY     • CARDIAC CATHETERIZATION N/A 2018    Procedure: Left Heart Cath;  Surgeon: Анна Booker MD;  [facility];  Service: Cardiovascular   • CARDIAC CATHETERIZATION N/A 2018    Procedure: Coronary angiography;  Surgeon: Анна Booker MD;  Location:  [facility] CATH INVASIVE LOCATION;  Service: Cardiovascular   • CARDIAC CATHETERIZATION N/A 2018    Procedure: Stent REMA coronary;  Surgeon: Анна Booker MD;  Location:  [facility] CATH INVASIVE LOCATION;  Service: Cardiovascular   • CARDIAC CATHETERIZATION  2018    Procedure: Percutaneous Mechanical Thrombectomy;  Surgeon: Анна Booker MD;  [facility];  Service: Cardiovascular   • CARDIAC CATHETERIZATION N/A 2019    Procedure: Coronary angiography;  Surgeon:  Анна Booker MD;  Location:  [facility] CATH INVASIVE LOCATION;  Service: Cardiology   • CARDIAC CATHETERIZATION N/A 2/13/2019    Procedure: Stent REMA coronary;  Surgeon: Анна Booker MD;  Location:  [facility] CATH INVASIVE LOCATION;  Service: Cardiology   • CARDIAC CATHETERIZATION N/A 2/13/2019    Procedure: Left Heart Cath;  Surgeon: Анна Booker MD;  Location:  [facility] CATH INVASIVE LOCATION;  Service: Cardiology   • CARDIAC CATHETERIZATION N/A 2/13/2019    Procedure: Left ventriculography;  Surgeon: Анна Booker MD;  Location:  [facility] CATH INVASIVE LOCATION;  Service: Cardiology   • CARDIAC ELECTROPHYSIOLOGY STUDY AND ABLATION     • COLONOSCOPY     • COLONOSCOPY N/A 6/26/2017    Procedure: COLONOSCOPY AT BEDSIDE;  Surgeon: Fadi Gallagher MD;  [facility];  Service:    • CORONARY ANGIOPLASTY     • EXTERNAL EAR SURGERY     • HAND SURGERY Left    • INGUINAL HERNIA REPAIR      X3   • MANDIBLE SURGERY     • NOSE SURGERY     • NM RT/LT HEART CATHETERS N/A 12/26/2018    Procedure: Percutaneous Coronary Intervention;  Surgeon: Анна Booker MD;  Location: [facility] CATH INVASIVE LOCATION;  Service: Cardiovascular   • SHOULDER SURGERY Bilateral    • THYROIDECTOMY     • TONSILLECTOMY               ECG 12 Lead  Date/Time: 2/24/2020 10:11 AM  Performed by: Анна Booker MD  Authorized by: Анна Booker MD   Comparison: compared with previous ECG from 9/23/2019  Similar to previous ECG  Rhythm: sinus rhythm  Rate: normal  Conduction: conduction normal  ST Segments: ST segments normal  T Waves: T waves normal  QRS axis: left  Other: no other findings    Clinical impression: non-specific ECG               Objective:     Physical Exam  GEN: no distress, alert and oriented  Lungs CTAB, no rales or wheezes  Chest: no abnormalities  Heart: RRR, no murmurs  Abdo: soft,  Nontender, nondistended  Extr: no edema, +2 DP and 2+ carotid pulses b/l  Skin: no rash or bruising  Psych: organized thought, normal behavior and  affect        Assessment:          Diagnosis Plan   1. Essential hypertension     2. Coronary artery disease involving native coronary artery of native heart without angina pectoris     3. Hyperlipidemia, unspecified hyperlipidemia type            Plan:       1. CAD:   Lateral STEMI December 2018 with REMA to proximal circumflex, followed by staged proximal LAD intervention using 3 overlapping REMA   No angina at this time with great exercise tolerance  Continue ASA, Atorva. Switch Effient to Plavix now that it has been one year.     2. HTN:   Metoprolol and Losartan    3. HLD:   Atorva    4. Hypothyroidism    5. ED:   Failed Viagra, will trial Cialis.       I will see him back in 6 months. Dr. Perez, thank you very much for referring this kind patient to me. Please call with any questions or concerns.        Анна Booker MD  02/24/20    Outpatient Encounter Medications as of 2/24/2020   Medication Sig Dispense Refill   • aspirin 81 MG chewable tablet Chew 1 tablet Daily.     • atorvastatin (LIPITOR) 80 MG tablet TAKE 1 TABLET BY MOUTH ONCE DAILY AT BEDTIME 90 tablet 1   • Cholecalciferol (VITAMIN D PO) Take 5,000 Int'l Units by mouth Daily. 2 tablets daily     • levothyroxine (SYNTHROID, LEVOTHROID) 150 MCG tablet Take 1 tablet by mouth Daily. 90 tablet 1   • losartan (COZAAR) 50 MG tablet Take 1 tablet by mouth Daily. 90 tablet 3   • metoprolol succinate XL (TOPROL-XL) 25 MG 24 hr tablet Take 0.5 tablets by mouth Daily. 30 tablet 3   • tobramycin (TOBREX) 0.3 % solution ophthalmic solution Administer 2 drops into the left eye Every 4 (Four) Hours While Awake. 1 bottle 0   • [DISCONTINUED] prasugrel (EFFIENT) 10 MG tablet TAKE 1 TABLET BY MOUTH ONCE DAILY 90 tablet 0   • clopidogrel (PLAVIX) 75 MG tablet Take 1 tablet by mouth Daily. 90 tablet 3   • tadalafil (CIALIS) 10 MG tablet Take 1 tablet by mouth Daily As Needed for Erectile Dysfunction (take 90 min prior to sexual intercourse). 30 tablet 3     No  facility-administered encounter medications on file as of 2/24/2020.

## 2020-02-27 RX ORDER — METOPROLOL SUCCINATE 25 MG/1
TABLET, EXTENDED RELEASE ORAL
Qty: 45 TABLET | Refills: 6 | Status: SHIPPED | OUTPATIENT
Start: 2020-02-27 | End: 2021-05-25

## 2020-03-10 ENCOUNTER — TELEPHONE (OUTPATIENT)
Dept: ENDOCRINOLOGY | Age: 60
End: 2020-03-10

## 2020-03-10 NOTE — TELEPHONE ENCOUNTER
Patient is calling for lab results, he stated he needed his dose of Levothyroxine increase. Please advise.labs done 2/24/2020

## 2020-03-11 NOTE — TELEPHONE ENCOUNTER
Based on the thyroid levels from February 24 patient's thyroid levels are within normal limits.  Does not warrant an increase in the dosage of the levothyroxine.  Could further discuss with Dr. Gutierrez during his office visit.

## 2020-05-08 ENCOUNTER — OFFICE VISIT (OUTPATIENT)
Dept: ENDOCRINOLOGY | Age: 60
End: 2020-05-08

## 2020-05-08 DIAGNOSIS — R63.5 ABNORMAL WEIGHT GAIN: ICD-10-CM

## 2020-05-08 DIAGNOSIS — E89.0 POSTOPERATIVE HYPOTHYROIDISM: Primary | ICD-10-CM

## 2020-05-08 DIAGNOSIS — E78.5 HYPERLIPIDEMIA LDL GOAL <70: ICD-10-CM

## 2020-05-08 PROCEDURE — 99442 PR PHYS/QHP TELEPHONE EVALUATION 11-20 MIN: CPT | Performed by: INTERNAL MEDICINE

## 2020-05-08 RX ORDER — LEVOTHYROXINE SODIUM 0.15 MG/1
150 TABLET ORAL DAILY
Qty: 90 TABLET | Refills: 1 | Status: SHIPPED | OUTPATIENT
Start: 2020-05-08 | End: 2020-10-06 | Stop reason: SDUPTHER

## 2020-05-08 NOTE — PROGRESS NOTES
60 y.o.    Patient Care Team:  Joan Perez MD as PCP - General (Family Medicine)    Chief Complaint:    Postoperative hypothyroidism  Subjective   Visit type: Telephone visit  Total time spent:18 min  Consent:  You have chosen to receive care through a telehealth visit.  Do you consent to use a video/audio connection for your medical care today? Yes  HPI   Patient is a 60-year-old male with a history of hypothyroidism postoperative and hypogonadism came for follow-up    Postoperative hypothyroidism  Patient had total thyroidectomy in the year 2000  He is currently taking levothyroxine 150 mcg daily.  Reports compliance  Denies side effects  Denies any symptoms of hyper or hypothyroidism  Male hypogonadism  Patient actually managed to stop on the testosterone products approximately 1 and half years ago when he had an MI  Abnormal weight gain  Patient is trying to lose weight but due to COVID-19 situation his GYM is closed and he may have gained a few pounds  He plans to get home gym soon      Interval History      The following portions of the patient's history were reviewed and updated as appropriate: allergies, current medications, past family history, past medical history, past social history, past surgical history and problem list.    Past Medical History:   Diagnosis Date   • A-fib (CMS/[facility])    • CAD (coronary artery disease)    • Colon polyp    • Colorblind    • Coronary artery disease involving native coronary artery of native heart with angina pectoris (CMS/[facility]) 1/2/2019   • Ex-smoker    • Heart attack (CMS/[facility])    • Hyperlipidemia LDL goal <70 1/2/2019   • Hypertension    • Hypothyroidism    • ST elevation myocardial infarction (STEMI) (CMS/[facility]) 12/26/2018   • Testosterone deficiency    • Ventricular fibrillation (CMS/[facility])      Family History   Problem Relation Age of Onset   • Arthritis Mother    • No Known Problems Father    • No Known Problems Brother    • No Known Problems Daughter      Social  History     Socioeconomic History   • Marital status:      Spouse name: Not on file   • Number of children: Not on file   • Years of education: Not on file   • Highest education level: Not on file   Tobacco Use   • Smoking status: Former Smoker     Types: Cigars     Last attempt to quit: 2009     Years since quittin.3   • Smokeless tobacco: Never Used   • Tobacco comment: Caffeine - coffee 3 daily,   Substance and Sexual Activity   • Alcohol use: Yes     Frequency: Monthly or less     Comment: occassional/less than 2oz per month    • Drug use: No   • Sexual activity: Yes     Partners: Female     Allergies   Allergen Reactions   • Meperidine Anaphylaxis     Per patient's wife, he stopped breathing   • Hydrocodone-Acetaminophen Hallucinations   • Oxycodone-Acetaminophen Hallucinations       Current Outpatient Medications:   •  aspirin 81 MG chewable tablet, Chew 1 tablet Daily., Disp: , Rfl:   •  atorvastatin (LIPITOR) 80 MG tablet, TAKE 1 TABLET BY MOUTH ONCE DAILY AT BEDTIME, Disp: 90 tablet, Rfl: 1  •  Cholecalciferol (VITAMIN D PO), Take 5,000 Int'l Units by mouth Daily. 2 tablets daily, Disp: , Rfl:   •  clopidogrel (PLAVIX) 75 MG tablet, Take 1 tablet by mouth Daily., Disp: 90 tablet, Rfl: 3  •  levothyroxine (SYNTHROID, LEVOTHROID) 150 MCG tablet, Take 1 tablet by mouth Daily., Disp: 90 tablet, Rfl: 1  •  losartan (COZAAR) 50 MG tablet, Take 1 tablet by mouth Daily., Disp: 90 tablet, Rfl: 3  •  metoprolol succinate XL (TOPROL-XL) 25 MG 24 hr tablet, Take 1/2 (one-half) tablet by mouth once daily, Disp: 45 tablet, Rfl: 6  •  tadalafil (CIALIS) 10 MG tablet, Take 1 tablet by mouth Daily As Needed for Erectile Dysfunction (take 90 min prior to sexual intercourse)., Disp: 30 tablet, Rfl: 3  •  tobramycin (TOBREX) 0.3 % solution ophthalmic solution, Administer 2 drops into the left eye Every 4 (Four) Hours While Awake., Disp: 1 bottle, Rfl: 0        Review of Systems   Constitutional: Negative.     Respiratory: Negative.    Cardiovascular: Negative.    Gastrointestinal: Negative.    All other systems reviewed and are negative.      Objective       There were no vitals filed for this visit.  There is no height or weight on file to calculate BMI.    Vital signs and physical examination could not be documented due to telephone visit  Physical Exam  Results Review:     I reviewed the patient's new clinical results.    Medical records reviewed  Summary:      Lab on 02/24/2020   Component Date Value Ref Range Status   • TSH 02/24/2020 1.590  0.270 - 4.200 uIU/mL Final   • Free T4 02/24/2020 1.40  0.93 - 1.70 ng/dL Final    Results may be falsely increased if patient taking Biotin.   • Glucose 02/24/2020 86  65 - 99 mg/dL Final   • BUN 02/24/2020 14  6 - 20 mg/dL Final   • Creatinine 02/24/2020 0.92  0.76 - 1.27 mg/dL Final   • eGFR Non  Am 02/24/2020 84  >60 mL/min/1.73 Final   • eGFR African Am 02/24/2020 102  >60 mL/min/1.73 Final   • BUN/Creatinine Ratio 02/24/2020 15.2  7.0 - 25.0 Final   • Sodium 02/24/2020 139  136 - 145 mmol/L Final   • Potassium 02/24/2020 4.1  3.5 - 5.2 mmol/L Final   • Chloride 02/24/2020 103  98 - 107 mmol/L Final   • Total CO2 02/24/2020 25.3  22.0 - 29.0 mmol/L Final   • Calcium 02/24/2020 8.9  8.6 - 10.5 mg/dL Final   • Total Protein 02/24/2020 6.2  6.0 - 8.5 g/dL Final   • Albumin 02/24/2020 4.20  3.50 - 5.20 g/dL Final   • Globulin 02/24/2020 2.0  gm/dL Final   • A/G Ratio 02/24/2020 2.1  g/dL Final   • Total Bilirubin 02/24/2020 0.7  0.2 - 1.2 mg/dL Final   • Alkaline Phosphatase 02/24/2020 90  39 - 117 U/L Final   • AST (SGOT) 02/24/2020 17  1 - 40 U/L Final   • ALT (SGPT) 02/24/2020 28  1 - 41 U/L Final     Lab Results   Component Value Date    HGBA1C 4.80 12/27/2018     Lab Results   Component Value Date    CREATININE 0.92 02/24/2020     Imaging Results (Most Recent)     None                Assessment and Plan:    Diagnoses and all orders for this visit:    Postoperative  "hypothyroidism  -     T4, Free; Future  -     TSH; Future    Abnormal weight gain    Hyperlipidemia LDL goal <70    Other orders  -     levothyroxine (SYNTHROID, LEVOTHROID) 150 MCG tablet; Take 1 tablet by mouth Daily.        Patient is feeling much better  He is currently taking levothyroxine 150 mcg daily.  Reports no symptoms of hyper or hypothyroidism  Prescriptions refilled  Advised patient not to go for any lab testing until next visit  His last TSH was 1.5 in February 2020 stable    Patient return to follow-up in 4 months with lab  Chato Gutierrez MD. FACE    05/08/20      EMR Dragon / transcription disclaimer:     \"Dictated utilizing Dragon dictation\".         "

## 2020-05-13 ENCOUNTER — RESULTS ENCOUNTER (OUTPATIENT)
Dept: ENDOCRINOLOGY | Age: 60
End: 2020-05-13

## 2020-05-13 DIAGNOSIS — E89.0 POSTOPERATIVE HYPOTHYROIDISM: ICD-10-CM

## 2020-06-26 ENCOUNTER — RESULTS ENCOUNTER (OUTPATIENT)
Dept: ENDOCRINOLOGY | Age: 60
End: 2020-06-26

## 2020-06-26 DIAGNOSIS — E89.0 POSTOPERATIVE HYPOTHYROIDISM: ICD-10-CM

## 2020-07-06 RX ORDER — ATORVASTATIN CALCIUM 80 MG/1
TABLET, FILM COATED ORAL
Qty: 90 TABLET | Refills: 0 | Status: SHIPPED | OUTPATIENT
Start: 2020-07-06 | End: 2020-10-05 | Stop reason: SDUPTHER

## 2020-08-27 LAB
T4 FREE SERPL-MCNC: 1.51 NG/DL (ref 0.93–1.7)
TSH SERPL DL<=0.005 MIU/L-ACNC: 1.86 UIU/ML (ref 0.27–4.2)

## 2020-10-02 ENCOUNTER — OFFICE VISIT (OUTPATIENT)
Dept: CARDIOLOGY | Facility: CLINIC | Age: 60
End: 2020-10-02

## 2020-10-02 VITALS
WEIGHT: 258 LBS | HEART RATE: 74 BPM | HEIGHT: 74 IN | DIASTOLIC BLOOD PRESSURE: 98 MMHG | BODY MASS INDEX: 33.11 KG/M2 | SYSTOLIC BLOOD PRESSURE: 142 MMHG

## 2020-10-02 DIAGNOSIS — E78.5 HYPERLIPIDEMIA, UNSPECIFIED HYPERLIPIDEMIA TYPE: ICD-10-CM

## 2020-10-02 DIAGNOSIS — I25.10 CORONARY ARTERY DISEASE INVOLVING NATIVE CORONARY ARTERY OF NATIVE HEART WITHOUT ANGINA PECTORIS: Primary | ICD-10-CM

## 2020-10-02 DIAGNOSIS — I10 ESSENTIAL HYPERTENSION: ICD-10-CM

## 2020-10-02 PROCEDURE — 93000 ELECTROCARDIOGRAM COMPLETE: CPT | Performed by: INTERNAL MEDICINE

## 2020-10-02 PROCEDURE — 99214 OFFICE O/P EST MOD 30 MIN: CPT | Performed by: INTERNAL MEDICINE

## 2020-10-02 NOTE — PROGRESS NOTES
Subjective:     Encounter Date: 10/02/20        Patient ID: Garrett Gomez Jr. is a 60 y.o. male.    Chief Complaint: CAD  This is a 60 year old male with history of CAD, HTN, HLD, & hypothyroidism who came into the hospital in December 2018 for chest pain. EKG ruled him in for STEMI and he underwent cardiac catheterization which found occluded circumflex. He received a 3x15mm Xience Cindy REMA to the proximal circumflex . He also had severe mid LAD stenosis, which was intervened on in early 2019 with 3 overlapping drug eluting stents to the proximal-mid LAD.     He has been working a lot as he is in line for stent.  He has not had time to exercise.  He is motivated to get off of some blood pressure medication because he continues to feel sluggish.  He is about 10 pounds heavier than he was this time last year.  His diet could be better.      REVIEW OF SYSTEMS:   All systems reviewed.  Pertinent positives identified in HPI.  All other systems are negative.    The following portions of the patient's history were reviewed and updated as appropriate: allergies, current medications, past family history, past medical history, past social history, past surgical history and problem list.    Past Medical History:   Diagnosis Date   • A-fib (CMS/[facility])    • CAD (coronary artery disease)    • Colon polyp    • Colorblind    • Coronary artery disease involving native coronary artery of native heart with angina pectoris (CMS/[facility]) 1/2/2019   • Ex-smoker    • Heart attack (CMS/[facility])    • Hyperlipidemia LDL goal <70 1/2/2019   • Hypertension    • Hypothyroidism    • ST elevation myocardial infarction (STEMI) (CMS/[facility]) 12/26/2018   • Testosterone deficiency    • Ventricular fibrillation (CMS/[facility])        Family History   Problem Relation Age of Onset   • Arthritis Mother    • No Known Problems Father    • No Known Problems Brother    • No Known Problems Daughter        Social History     Socioeconomic History   • Marital status:       Spouse name: Not on file   • Number of children: Not on file   • Years of education: Not on file   • Highest education level: Not on file   Tobacco Use   • Smoking status: Former Smoker     Types: Cigars     Quit date: 2009     Years since quittin.7   • Smokeless tobacco: Never Used   • Tobacco comment: Caffeine - coffee 3 daily,   Substance and Sexual Activity   • Alcohol use: Yes     Frequency: Monthly or less     Comment: occassional/less than 2oz per month    • Drug use: No   • Sexual activity: Yes     Partners: Female       Allergies   Allergen Reactions   • Meperidine Anaphylaxis     Per patient's wife, he stopped breathing   • Hydrocodone-Acetaminophen Hallucinations   • Oxycodone-Acetaminophen Hallucinations       Past Surgical History:   Procedure Laterality Date   • ADENOIDECTOMY     • ANGIOPLASTY      STENT PLACEMENT    • BACK SURGERY     • CARDIAC CATHETERIZATION N/A 2018    Procedure: Left Heart Cath;  Surgeon: Анна Booker MD;  Location:  [facility] CATH INVASIVE LOCATION;  Service: Cardiovascular   • CARDIAC CATHETERIZATION N/A 2018    Procedure: Coronary angiography;  Surgeon: Анна Booker MD;  Location:  [facility] CATH INVASIVE LOCATION;  Service: Cardiovascular   • CARDIAC CATHETERIZATION N/A 2018    Procedure: Stent REMA coronary;  Surgeon: Анна Booker MD;  Location:  [facility] CATH INVASIVE LOCATION;  Service: Cardiovascular   • CARDIAC CATHETERIZATION  2018    Procedure: Percutaneous Mechanical Thrombectomy;  Surgeon: Анна Booker MD;  Location:  [facility] CATH INVASIVE LOCATION;  Service: Cardiovascular   • CARDIAC CATHETERIZATION N/A 2019    Procedure: Coronary angiography;  Surgeon: Анна Booker MD;  Location:  [facility] CATH INVASIVE LOCATION;  Service: Cardiology   • CARDIAC CATHETERIZATION N/A 2019    Procedure: Stent REMA coronary;  Surgeon: Анна Booker MD;  Location:  [facility] CATH INVASIVE LOCATION;  Service: Cardiology   • CARDIAC  CATHETERIZATION N/A 2/13/2019    Procedure: Left Heart Cath;  Surgeon: Анна Booker MD;  Location:  [facility] CATH INVASIVE LOCATION;  Service: Cardiology   • CARDIAC CATHETERIZATION N/A 2/13/2019    Procedure: Left ventriculography;  Surgeon: Анна Booker MD;  Location:  [facility] CATH INVASIVE LOCATION;  Service: Cardiology   • CARDIAC ELECTROPHYSIOLOGY STUDY AND ABLATION     • COLONOSCOPY     • COLONOSCOPY N/A 6/26/2017    Procedure: COLONOSCOPY AT BEDSIDE;  Surgeon: Fadi Gallagher MD;  Location:  [facility] ENDOSCOPY;  Service:    • CORONARY ANGIOPLASTY     • EXTERNAL EAR SURGERY     • HAND SURGERY Left    • INGUINAL HERNIA REPAIR      X3   • MANDIBLE SURGERY     • NOSE SURGERY     • SD RT/LT HEART CATHETERS N/A 12/26/2018    Procedure: Percutaneous Coronary Intervention;  Surgeon: Анна Booker MD;  Location:  [facility] CATH INVASIVE LOCATION;  Service: Cardiovascular   • SHOULDER SURGERY Bilateral    • THYROIDECTOMY     • TONSILLECTOMY               ECG 12 Lead    Date/Time: 10/2/2020 8:14 AM  Performed by: Анна Booker MD  Authorized by: Анна Booker MD   Comparison: compared with previous ECG from 2/24/2020  Similar to previous ECG  Rhythm: sinus rhythm  Rate: normal  Conduction: conduction normal  ST Segments: ST segments normal  T Waves: T waves normal  QRS axis: left  Other: no other findings    Clinical impression: abnormal EKG               Objective:     Physical Exam  GEN: no distress, alert and oriented, obese  Lungs CTAB, no rales or wheezes  Chest: no abnormalities  Heart: RRR, no murmurs  Abdo: soft,  Nontender, nondistended  Extr: no edema, +2 DP and 2+ carotid pulses b/l  Skin: no rash or bruising  Psych: organized thought, normal behavior and affect        Assessment:          Diagnosis Plan   1. Coronary artery disease involving native coronary artery of native heart without angina pectoris     2. Essential hypertension     3. Hyperlipidemia, unspecified hyperlipidemia type  Lipid Panel           Plan:       1. CAD:   Lateral STEMI December 2018 with REMA to proximal circumflex, followed by staged proximal LAD intervention using 3 overlapping REMA   No angina at this time with great exercise tolerance  Continue ASA/Plavix, Atorva.     2. HTN:   Metoprolol 12.5 and Losartan 50.  I asked him to call me next week with a blood pressure log.  Today it is elevated at 142/98.  If he loses about 20 or 30 pounds he might be able to get off the metoprolol which should make him feel better.    3. HLD:   Atorva.  He will get lipids drawn.    4. Hypothyroidism    I will see him back in 6 months. Dr. Perez, thank you very much for referring this kind patient to me. Please call with any questions or concerns.        Анна Booker MD  10/02/20    Outpatient Encounter Medications as of 10/2/2020   Medication Sig Dispense Refill   • aspirin 81 MG chewable tablet Chew 1 tablet Daily.     • atorvastatin (LIPITOR) 80 MG tablet TAKE 1 TABLET BY MOUTH ONCE DAILY AT BEDTIME 90 tablet 0   • Cholecalciferol (VITAMIN D PO) Take 5,000 Int'l Units by mouth Daily. 2 tablets daily     • clopidogrel (PLAVIX) 75 MG tablet Take 1 tablet by mouth Daily. 90 tablet 3   • levothyroxine (SYNTHROID, LEVOTHROID) 150 MCG tablet Take 1 tablet by mouth Daily. 90 tablet 1   • losartan (COZAAR) 50 MG tablet Take 1 tablet by mouth Daily. 90 tablet 3   • metoprolol succinate XL (TOPROL-XL) 25 MG 24 hr tablet Take 1/2 (one-half) tablet by mouth once daily 45 tablet 6   • [DISCONTINUED] tadalafil (CIALIS) 10 MG tablet Take 1 tablet by mouth Daily As Needed for Erectile Dysfunction (take 90 min prior to sexual intercourse). 30 tablet 3   • [DISCONTINUED] tobramycin (TOBREX) 0.3 % solution ophthalmic solution Administer 2 drops into the left eye Every 4 (Four) Hours While Awake. 1 bottle 0     No facility-administered encounter medications on file as of 10/2/2020.

## 2020-10-05 RX ORDER — ATORVASTATIN CALCIUM 80 MG/1
80 TABLET, FILM COATED ORAL
Qty: 90 TABLET | Refills: 0 | Status: SHIPPED | OUTPATIENT
Start: 2020-10-05 | End: 2020-12-29

## 2020-10-06 ENCOUNTER — OFFICE VISIT (OUTPATIENT)
Dept: ENDOCRINOLOGY | Age: 60
End: 2020-10-06

## 2020-10-06 DIAGNOSIS — E89.0 POSTOPERATIVE HYPOTHYROIDISM: Primary | ICD-10-CM

## 2020-10-06 DIAGNOSIS — I25.119 CORONARY ARTERY DISEASE INVOLVING NATIVE CORONARY ARTERY OF NATIVE HEART WITH ANGINA PECTORIS (HCC): ICD-10-CM

## 2020-10-06 DIAGNOSIS — E78.5 HYPERLIPIDEMIA LDL GOAL <70: ICD-10-CM

## 2020-10-06 DIAGNOSIS — I10 ESSENTIAL HYPERTENSION: ICD-10-CM

## 2020-10-06 PROCEDURE — 99442 PR PHYS/QHP TELEPHONE EVALUATION 11-20 MIN: CPT | Performed by: INTERNAL MEDICINE

## 2020-10-06 RX ORDER — LEVOTHYROXINE SODIUM 0.15 MG/1
150 TABLET ORAL DAILY
Qty: 90 TABLET | Refills: 1 | Status: SHIPPED | OUTPATIENT
Start: 2020-10-06 | End: 2021-05-18

## 2020-12-08 DIAGNOSIS — E78.5 HYPERLIPIDEMIA LDL GOAL <70: ICD-10-CM

## 2020-12-08 DIAGNOSIS — Z11.59 NEED FOR HEPATITIS C SCREENING TEST: ICD-10-CM

## 2020-12-08 DIAGNOSIS — I10 ESSENTIAL HYPERTENSION: ICD-10-CM

## 2020-12-09 LAB
ALBUMIN SERPL-MCNC: 4.5 G/DL (ref 3.5–5.2)
ALBUMIN/GLOB SERPL: 2.1 G/DL
ALP SERPL-CCNC: 114 U/L (ref 39–117)
ALT SERPL-CCNC: 28 U/L (ref 1–41)
AST SERPL-CCNC: 20 U/L (ref 1–40)
BASOPHILS # BLD AUTO: 0.07 10*3/MM3 (ref 0–0.2)
BASOPHILS NFR BLD AUTO: 1 % (ref 0–1.5)
BILIRUB SERPL-MCNC: 0.6 MG/DL (ref 0–1.2)
BUN SERPL-MCNC: 14 MG/DL (ref 6–20)
BUN/CREAT SERPL: 13.1 (ref 7–25)
CALCIUM SERPL-MCNC: 9.6 MG/DL (ref 8.6–10.5)
CHLORIDE SERPL-SCNC: 103 MMOL/L (ref 98–107)
CHOLEST SERPL-MCNC: 138 MG/DL (ref 0–200)
CO2 SERPL-SCNC: 22.3 MMOL/L (ref 22–29)
CREAT SERPL-MCNC: 1.07 MG/DL (ref 0.76–1.27)
EOSINOPHIL # BLD AUTO: 0.07 10*3/MM3 (ref 0–0.4)
EOSINOPHIL NFR BLD AUTO: 1 % (ref 0.3–6.2)
ERYTHROCYTE [DISTWIDTH] IN BLOOD BY AUTOMATED COUNT: 12.7 % (ref 12.3–15.4)
GLOBULIN SER CALC-MCNC: 2.1 GM/DL
GLUCOSE SERPL-MCNC: 84 MG/DL (ref 65–99)
HCT VFR BLD AUTO: 46.6 % (ref 37.5–51)
HCV AB S/CO SERPL IA: <0.1 S/CO RATIO (ref 0–0.9)
HDLC SERPL-MCNC: 39 MG/DL (ref 40–60)
HGB BLD-MCNC: 16.1 G/DL (ref 13–17.7)
IMM GRANULOCYTES # BLD AUTO: 0.05 10*3/MM3 (ref 0–0.05)
IMM GRANULOCYTES NFR BLD AUTO: 0.7 % (ref 0–0.5)
LDLC SERPL CALC-MCNC: 65 MG/DL (ref 0–100)
LYMPHOCYTES # BLD AUTO: 2 10*3/MM3 (ref 0.7–3.1)
LYMPHOCYTES NFR BLD AUTO: 28.8 % (ref 19.6–45.3)
MCH RBC QN AUTO: 31 PG (ref 26.6–33)
MCHC RBC AUTO-ENTMCNC: 34.5 G/DL (ref 31.5–35.7)
MCV RBC AUTO: 89.6 FL (ref 79–97)
MONOCYTES # BLD AUTO: 0.55 10*3/MM3 (ref 0.1–0.9)
MONOCYTES NFR BLD AUTO: 7.9 % (ref 5–12)
NEUTROPHILS # BLD AUTO: 4.2 10*3/MM3 (ref 1.7–7)
NEUTROPHILS NFR BLD AUTO: 60.6 % (ref 42.7–76)
NRBC BLD AUTO-RTO: 0 /100 WBC (ref 0–0.2)
PLATELET # BLD AUTO: 240 10*3/MM3 (ref 140–450)
POTASSIUM SERPL-SCNC: 4.7 MMOL/L (ref 3.5–5.2)
PROT SERPL-MCNC: 6.6 G/DL (ref 6–8.5)
RBC # BLD AUTO: 5.2 10*6/MM3 (ref 4.14–5.8)
SODIUM SERPL-SCNC: 138 MMOL/L (ref 136–145)
TRIGL SERPL-MCNC: 206 MG/DL (ref 0–150)
VLDLC SERPL CALC-MCNC: 34 MG/DL (ref 5–40)
WBC # BLD AUTO: 6.94 10*3/MM3 (ref 3.4–10.8)

## 2020-12-11 ENCOUNTER — OFFICE VISIT (OUTPATIENT)
Dept: FAMILY MEDICINE CLINIC | Facility: CLINIC | Age: 60
End: 2020-12-11

## 2020-12-11 VITALS
DIASTOLIC BLOOD PRESSURE: 98 MMHG | SYSTOLIC BLOOD PRESSURE: 140 MMHG | RESPIRATION RATE: 17 BRPM | TEMPERATURE: 96.9 F | WEIGHT: 254.2 LBS | HEIGHT: 74 IN | OXYGEN SATURATION: 98 % | BODY MASS INDEX: 32.62 KG/M2 | HEART RATE: 98 BPM

## 2020-12-11 DIAGNOSIS — Z00.00 ANNUAL PHYSICAL EXAM: Primary | ICD-10-CM

## 2020-12-11 DIAGNOSIS — Z12.5 SCREENING FOR PROSTATE CANCER: ICD-10-CM

## 2020-12-11 DIAGNOSIS — I10 ESSENTIAL HYPERTENSION: ICD-10-CM

## 2020-12-11 DIAGNOSIS — E78.5 HYPERLIPIDEMIA LDL GOAL <70: ICD-10-CM

## 2020-12-11 PROCEDURE — 99396 PREV VISIT EST AGE 40-64: CPT | Performed by: FAMILY MEDICINE

## 2020-12-11 RX ORDER — TADALAFIL 10 MG/1
TABLET ORAL
COMMUNITY
Start: 2020-11-25 | End: 2021-01-17

## 2020-12-11 NOTE — PROGRESS NOTES
"Chief Complaint   Patient presents with   • Annual Exam       Subjective   Garrett Gomez Jr. is a 60 y.o. male.     History of Present Illness   Annual exam  Diet and exercise  Says he would like to lose weight. Says he eats eggs for breakfast. He eats lunch, had soup and sandwich for lunch, then he has lean meat and veggies for dinner. He eats balony, counting carbs. Drinks sugar free tea. He does 30 reps on his 10 machines and it takes him about 20 min. He does work out for his motorcycle.   He has hx of heart attack.   Concerned about his clopidogrel.   Says it is hard to lose weight. He is going to the gym a lot. He is solid with muscle.   Needs c scope in 2022.   Dental visit current  Eye exam current  He is in the public a lot, works with police and EMS.     The following portions of the patient's history were reviewed and updated as appropriate: allergies, current medications, past family history, past medical history, past social history, past surgical history and problem list.    Review of Systems   Respiratory: Negative.    Cardiovascular: Negative.    Neurological: Negative.        /98 (BP Location: Left arm, Patient Position: Sitting, Cuff Size: Adult)   Pulse 98   Temp 96.9 °F (36.1 °C) (Infrared)   Resp 17   Ht 188 cm (74\")   Wt 115 kg (254 lb 3.2 oz)   SpO2 98%   BMI 32.64 kg/m²       Objective   Physical Exam  Vitals signs reviewed.   Constitutional:       General: He is not in acute distress.     Appearance: Normal appearance. He is not ill-appearing.   HENT:      Right Ear: Tympanic membrane, ear canal and external ear normal. There is impacted cerumen.      Left Ear: Tympanic membrane, ear canal and external ear normal. There is impacted cerumen.   Eyes:      General: No scleral icterus.        Right eye: No discharge.         Left eye: No discharge.      Conjunctiva/sclera: Conjunctivae normal.   Neck:      Musculoskeletal: Neck supple.      Thyroid: No thyromegaly.      Vascular: " No carotid bruit.   Cardiovascular:      Rate and Rhythm: Normal rate and regular rhythm.      Heart sounds: Normal heart sounds. No murmur. No friction rub. No gallop.    Pulmonary:      Effort: Pulmonary effort is normal. No respiratory distress.      Breath sounds: Normal breath sounds. No wheezing or rales.   Chest:      Chest wall: No tenderness.   Abdominal:      General: Bowel sounds are normal. There is no distension.      Palpations: Abdomen is soft. There is no mass.      Tenderness: There is no abdominal tenderness. There is no guarding.   Musculoskeletal:         General: No swelling or deformity.      Right lower leg: No edema.      Left lower leg: No edema.   Lymphadenopathy:      Cervical: No cervical adenopathy.   Skin:     General: Skin is warm and dry.   Neurological:      Mental Status: He is alert and oriented to person, place, and time.      Motor: No abnormal muscle tone.      Coordination: Coordination normal.   Psychiatric:         Mood and Affect: Mood normal.         Behavior: Behavior normal.         Assessment/Plan   Diagnoses and all orders for this visit:    1. Annual physical exam (Primary)    2. Essential hypertension    3. Hyperlipidemia LDL goal <70    4. Screening for prostate cancer  -     PSA SCREENING    Other orders  -     Please Note  -     Written Authorization      Follows with endocrinology for hypothryoidism, post op/surgical resection.     Preventive counseling  He is frustrated with his inability to lose weight and the gaining of weight since he has been off of testosterone which he understands the importance of not being on hormone therapy any longer.  He talks about what he eats like he makes healthy choices but does seem that some choices are high in sugar.   We talked about seeing a dietician but first he is just going to get an aracelis where he can track calories and count more closely. We also talked about age and distribution of fat and how good the exercise is even  if his body habitus is not changing significantly.   Add on PSA  Other labs done prior to coming and these were discussed.   Recommended shingles shot.

## 2020-12-12 LAB
Lab: NORMAL
PSA SERPL-MCNC: 2.25 NG/ML (ref 0–4)
WRITTEN AUTHORIZATION: NORMAL

## 2020-12-29 RX ORDER — ATORVASTATIN CALCIUM 80 MG/1
TABLET, FILM COATED ORAL
Qty: 90 TABLET | Refills: 3 | Status: SHIPPED | OUTPATIENT
Start: 2020-12-29 | End: 2021-12-27

## 2021-02-16 DIAGNOSIS — R93.89 ABNORMAL CHEST X-RAY: Primary | ICD-10-CM

## 2021-02-18 ENCOUNTER — HOSPITAL ENCOUNTER (OUTPATIENT)
Dept: GENERAL RADIOLOGY | Facility: HOSPITAL | Age: 61
Discharge: HOME | End: 2021-02-18
Admitting: FAMILY MEDICINE

## 2021-02-18 DIAGNOSIS — R93.89 ABNORMAL CHEST X-RAY: ICD-10-CM

## 2021-02-18 PROCEDURE — 71046 X-RAY EXAM CHEST 2 VIEWS: CPT

## 2021-02-19 RX ORDER — CLOPIDOGREL BISULFATE 75 MG/1
TABLET ORAL
Qty: 90 TABLET | Refills: 0 | Status: SHIPPED | OUTPATIENT
Start: 2021-02-19 | End: 2021-05-25

## 2021-02-26 RX ORDER — LOSARTAN POTASSIUM 50 MG/1
TABLET ORAL
Qty: 90 TABLET | Refills: 0 | Status: SHIPPED | OUTPATIENT
Start: 2021-02-26 | End: 2021-05-25

## 2021-03-22 ENCOUNTER — BULK ORDERING (OUTPATIENT)
Dept: CASE MANAGEMENT | Facility: OTHER | Age: 61
End: 2021-03-22

## 2021-03-22 DIAGNOSIS — Z23 IMMUNIZATION DUE: ICD-10-CM

## 2021-04-06 ENCOUNTER — OFFICE VISIT (OUTPATIENT)
Dept: CARDIOLOGY | Facility: CLINIC | Age: 61
End: 2021-04-06

## 2021-04-06 VITALS
BODY MASS INDEX: 31.44 KG/M2 | HEIGHT: 74 IN | SYSTOLIC BLOOD PRESSURE: 142 MMHG | WEIGHT: 245 LBS | DIASTOLIC BLOOD PRESSURE: 92 MMHG | HEART RATE: 72 BPM

## 2021-04-06 DIAGNOSIS — I25.10 CORONARY ARTERY DISEASE INVOLVING NATIVE CORONARY ARTERY OF NATIVE HEART WITHOUT ANGINA PECTORIS: Primary | ICD-10-CM

## 2021-04-06 DIAGNOSIS — I10 ESSENTIAL HYPERTENSION: ICD-10-CM

## 2021-04-06 DIAGNOSIS — E78.5 HYPERLIPIDEMIA, UNSPECIFIED HYPERLIPIDEMIA TYPE: ICD-10-CM

## 2021-04-06 PROCEDURE — 99213 OFFICE O/P EST LOW 20 MIN: CPT | Performed by: INTERNAL MEDICINE

## 2021-04-06 RX ORDER — MULTIVIT WITH MINERALS/LUTEIN
250 TABLET ORAL NIGHTLY
COMMUNITY

## 2021-04-06 NOTE — PROGRESS NOTES
Subjective:     Encounter Date: 04/06/21        Patient ID: Garrett Gomez Jr. is a 61 y.o. male.    Chief Complaint: CAD  This is a 61 year old male with history of CAD, HTN, HLD, & hypothyroidism who came into the hospital in December 2018 for chest pain. EKG ruled him in for STEMI and he underwent cardiac catheterization which found occluded circumflex. He received a 3x15mm Xience Cindy REMA to the proximal circumflex . He also had severe mid LAD stenosis, which was intervened on in early 2019 with 3 overlapping drug eluting stents to the proximal-mid LAD.  LV function is preserved.     He is doing well.  No angina.  Lifts weights several times a week, high intensity.  Is not doing any cardio.  Diet is quite good.  Triglycerides are 200 but the rest of the lipid panel from December 2020 is controlled.  Since then he has cut back on carbs significantly.  Had Covid in January.  Notably had bilateral pneumonia which was fairly mild and he recovered within a week with steroids and azithromycin.    Works in EMS.  Loves to ride motorcycles.    REVIEW OF SYSTEMS:   All systems reviewed.  Pertinent positives identified in HPI.  All other systems are negative.    The following portions of the patient's history were reviewed and updated as appropriate: allergies, current medications, past family history, past medical history, past social history, past surgical history and problem list.    Past Medical History:   Diagnosis Date   • A-fib (CMS/[facility])    • CAD (coronary artery disease)    • Colon polyp    • Colorblind    • Coronary artery disease involving native coronary artery of native heart with angina pectoris (CMS/[facility]) 1/2/2019   • Ex-smoker    • Heart attack (CMS/[facility])    • Hyperlipidemia LDL goal <70 1/2/2019   • Hypertension    • Hypothyroidism    • ST elevation myocardial infarction (STEMI) (CMS/[facility]) 12/26/2018   • Testosterone deficiency    • Ventricular fibrillation (CMS/[facility])        Family History   Problem  Relation Age of Onset   • Arthritis Mother    • No Known Problems Father    • No Known Problems Brother    • No Known Problems Daughter    • No Known Problems Brother        Social History     Socioeconomic History   • Marital status:      Spouse name: Not on file   • Number of children: Not on file   • Years of education: Not on file   • Highest education level: Not on file   Tobacco Use   • Smoking status: Former Smoker     Types: Cigars     Quit date: 2009     Years since quittin.2   • Smokeless tobacco: Never Used   • Tobacco comment: Caffeine - coffee 3 daily,   Substance and Sexual Activity   • Alcohol use: Not Currently     Comment: occassional/less than 2oz per month    • Drug use: No   • Sexual activity: Yes     Partners: Female       Allergies   Allergen Reactions   • Meperidine Anaphylaxis     Per patient's wife, he stopped breathing   • Hydrocodone-Acetaminophen Hallucinations   • Oxycodone-Acetaminophen Hallucinations       Past Surgical History:   Procedure Laterality Date   • ADENOIDECTOMY     • ANGIOPLASTY      STENT PLACEMENT    • BACK SURGERY     • CARDIAC CATHETERIZATION N/A 2018    Procedure: Left Heart Cath;  Surgeon: Анна Booker MD;  Location: [facility] CATH INVASIVE LOCATION;  Service: Cardiovascular   • CARDIAC CATHETERIZATION N/A 2018    Procedure: Coronary angiography;  Surgeon: Анна Booker MD;  Location: [facility] CATH INVASIVE LOCATION;  Service: Cardiovascular   • CARDIAC CATHETERIZATION N/A 2018    Procedure: Stent REMA coronary;  Surgeon: Анна Booker MD;  [facility];  Service: Cardiovascular   • CARDIAC CATHETERIZATION  2018    Procedure: Percutaneous Mechanical Thrombectomy;  Surgeon: Анна Booker MD;  Location: [facility] CATH INVASIVE LOCATION;  Service: Cardiovascular   • CARDIAC CATHETERIZATION N/A 2019    Procedure: Coronary angiography;  Surgeon: Анна Booker MD;  Location: [facility] CATH INVASIVE LOCATION;   Service: Cardiology   • CARDIAC CATHETERIZATION N/A 2/13/2019    Procedure: Stent REMA coronary;  Surgeon: Анна Booker MD;  Location:  [facility] CATH INVASIVE LOCATION;  Service: Cardiology   • CARDIAC CATHETERIZATION N/A 2/13/2019    Procedure: Left Heart Cath;  Surgeon: Анна Booker MD;  Location:  [facility] CATH INVASIVE LOCATION;  Service: Cardiology   • CARDIAC CATHETERIZATION N/A 2/13/2019    Procedure: Left ventriculography;  Surgeon: Анна Booker MD;  Location:  [facility] CATH INVASIVE LOCATION;  Service: Cardiology   • CARDIAC ELECTROPHYSIOLOGY STUDY AND ABLATION     • COLONOSCOPY     • COLONOSCOPY N/A 6/26/2017    Procedure: COLONOSCOPY AT BEDSIDE;  Surgeon: Fadi Gallagher MD;  Location:  [facility];  Service:    • CORONARY ANGIOPLASTY     • EXTERNAL EAR SURGERY     • HAND SURGERY Left    • INGUINAL HERNIA REPAIR      X3   • MANDIBLE SURGERY     • NOSE SURGERY     • DC RT/LT HEART CATHETERS N/A 12/26/2018    Procedure: Percutaneous Coronary Intervention;  Surgeon: Анна Booker MD;  Location:  [facility] CATH INVASIVE LOCATION;  Service: Cardiovascular   • SHOULDER SURGERY Bilateral    • THYROIDECTOMY     • TONSILLECTOMY             Procedures       Objective:     Physical Exam     GEN: no distress, alert and oriented, obese  Lungs CTAB, no rales or wheezes  Chest: no abnormalities  Heart: RRR, no murmurs  Abdo: soft,  Nontender, nondistended  Extr: no edema, +2 DP and 2+ carotid pulses b/l  Skin: no rash or bruising  Psych: organized thought, normal behavior and affect        Assessment:          Diagnosis Plan   1. Coronary artery disease involving native coronary artery of native heart without angina pectoris     2. Essential hypertension     3. Hyperlipidemia, unspecified hyperlipidemia type            Plan:       1. CAD:   Lateral STEMI December 2018 with REMA to proximal circumflex, followed by staged proximal LAD intervention using 3 overlapping REMA   No angina at this time with great exercise  tolerance.  Needs to add cardiovascular exercise 4 times a week for 30 minutes.  Continue ASA/Plavix indefinitely due to long length of stents in the LAD, Atorva.     2. HTN:   Metoprolol 12.5 and Losartan 50.  Blood pressure is well controlled.  Needs a large cuff.    3. HLD:   Atorva.  Will have repeat lipids with Dr. Perez.    4. Hypothyroidism    I will see him back in 1 year. Dr. Perez, thank you very much for referring this kind patient to me. Please call with any questions or concerns.        Анна Booker MD  04/06/21    Outpatient Encounter Medications as of 4/6/2021   Medication Sig Dispense Refill   • aspirin 81 MG chewable tablet Chew 1 tablet Daily.     • atorvastatin (LIPITOR) 80 MG tablet TAKE 1 TABLET BY MOUTH EVERY DAY AT BEDTIME 90 tablet 3   • Cholecalciferol (VITAMIN D PO) Take 10,000 Int'l Units by mouth Daily. 2 tablets daily     • clopidogrel (PLAVIX) 75 MG tablet Take 1 tablet by mouth once daily 90 tablet 0   • levothyroxine (SYNTHROID, LEVOTHROID) 150 MCG tablet Take 1 tablet by mouth Daily. 90 tablet 1   • losartan (COZAAR) 50 MG tablet Take 1 tablet by mouth once daily 90 tablet 0   • metoprolol succinate XL (TOPROL-XL) 25 MG 24 hr tablet Take 1/2 (one-half) tablet by mouth once daily 45 tablet 6   • vitamin C (ASCORBIC ACID) 250 MG tablet Take 250 mg by mouth Daily.     • [DISCONTINUED] azithromycin (Zithromax Z-Venkatesh) 250 MG tablet Take 2 tablets the first day, then 1 tablet daily for 4 days. 6 tablet 0     No facility-administered encounter medications on file as of 4/6/2021.

## 2021-05-18 RX ORDER — LEVOTHYROXINE SODIUM 150 UG/1
TABLET ORAL
Qty: 90 TABLET | Refills: 0 | Status: SHIPPED | OUTPATIENT
Start: 2021-05-18 | End: 2021-08-16

## 2021-05-25 RX ORDER — CLOPIDOGREL BISULFATE 75 MG/1
TABLET ORAL
Qty: 90 TABLET | Refills: 0 | Status: SHIPPED | OUTPATIENT
Start: 2021-05-25 | End: 2021-08-26

## 2021-05-25 RX ORDER — LOSARTAN POTASSIUM 50 MG/1
TABLET ORAL
Qty: 90 TABLET | Refills: 3 | Status: SHIPPED | OUTPATIENT
Start: 2021-05-25 | End: 2022-05-24

## 2021-05-25 RX ORDER — METOPROLOL SUCCINATE 25 MG/1
TABLET, EXTENDED RELEASE ORAL
Qty: 45 TABLET | Refills: 0 | Status: SHIPPED | OUTPATIENT
Start: 2021-05-25 | End: 2021-08-26

## 2021-08-07 ENCOUNTER — TELEPHONE (OUTPATIENT)
Dept: URGENT CARE | Facility: CLINIC | Age: 61
End: 2021-08-07

## 2021-08-07 NOTE — TELEPHONE ENCOUNTER
States he finished his doxycycline yesterday. Requesting another round of the antibiotic because symptoms stopped resolving when he stopped the meds.

## 2021-08-16 RX ORDER — LEVOTHYROXINE SODIUM 150 UG/1
TABLET ORAL
Qty: 30 TABLET | Refills: 0 | Status: SHIPPED | OUTPATIENT
Start: 2021-08-16 | End: 2021-08-27 | Stop reason: SDUPTHER

## 2021-08-17 DIAGNOSIS — M25.462 PAIN AND SWELLING OF LEFT KNEE: ICD-10-CM

## 2021-08-17 DIAGNOSIS — M25.562 ACUTE PAIN OF LEFT KNEE: Primary | ICD-10-CM

## 2021-08-17 DIAGNOSIS — M25.562 PAIN AND SWELLING OF LEFT KNEE: ICD-10-CM

## 2021-08-26 RX ORDER — LEVOTHYROXINE SODIUM 0.15 MG/1
150 TABLET ORAL DAILY
Qty: 90 TABLET | Refills: 0 | OUTPATIENT
Start: 2021-08-26

## 2021-08-26 RX ORDER — METOPROLOL SUCCINATE 25 MG/1
TABLET, EXTENDED RELEASE ORAL
Qty: 45 TABLET | Refills: 0 | Status: SHIPPED | OUTPATIENT
Start: 2021-08-26 | End: 2021-12-02

## 2021-08-26 RX ORDER — CLOPIDOGREL BISULFATE 75 MG/1
TABLET ORAL
Qty: 90 TABLET | Refills: 0 | Status: SHIPPED | OUTPATIENT
Start: 2021-08-26 | End: 2021-11-19

## 2021-08-27 DIAGNOSIS — I10 ESSENTIAL HYPERTENSION: Primary | ICD-10-CM

## 2021-08-27 DIAGNOSIS — E89.0 POSTOPERATIVE HYPOTHYROIDISM: ICD-10-CM

## 2021-08-27 DIAGNOSIS — Z12.5 SCREENING FOR PROSTATE CANCER: ICD-10-CM

## 2021-08-27 DIAGNOSIS — E03.9 ADULT HYPOTHYROIDISM: ICD-10-CM

## 2021-08-27 DIAGNOSIS — E78.5 HYPERLIPIDEMIA LDL GOAL <70: ICD-10-CM

## 2021-08-27 RX ORDER — LEVOTHYROXINE SODIUM 0.15 MG/1
150 TABLET ORAL DAILY
Qty: 90 TABLET | Refills: 1 | Status: SHIPPED | OUTPATIENT
Start: 2021-08-27 | End: 2022-03-14 | Stop reason: SDUPTHER

## 2021-08-31 RX ORDER — LEVOTHYROXINE SODIUM 150 UG/1
TABLET ORAL
Qty: 90 TABLET | Refills: 0 | OUTPATIENT
Start: 2021-08-31

## 2021-10-21 DIAGNOSIS — N52.8 OTHER MALE ERECTILE DYSFUNCTION: Primary | ICD-10-CM

## 2021-10-21 RX ORDER — TADALAFIL 20 MG/1
20 TABLET ORAL DAILY PRN
Qty: 30 TABLET | Refills: 0 | Status: SHIPPED | OUTPATIENT
Start: 2021-10-21

## 2021-10-25 ENCOUNTER — OFFICE VISIT (OUTPATIENT)
Dept: FAMILY MEDICINE CLINIC | Facility: CLINIC | Age: 61
End: 2021-10-25

## 2021-10-25 VITALS
HEART RATE: 73 BPM | SYSTOLIC BLOOD PRESSURE: 132 MMHG | DIASTOLIC BLOOD PRESSURE: 62 MMHG | HEIGHT: 74 IN | TEMPERATURE: 97.1 F | WEIGHT: 257.6 LBS | OXYGEN SATURATION: 97 % | BODY MASS INDEX: 33.06 KG/M2 | RESPIRATION RATE: 16 BRPM

## 2021-10-25 DIAGNOSIS — R53.82 CHRONIC FATIGUE: Primary | ICD-10-CM

## 2021-10-25 DIAGNOSIS — I10 ESSENTIAL HYPERTENSION: ICD-10-CM

## 2021-10-25 DIAGNOSIS — E78.5 HYPERLIPIDEMIA LDL GOAL <70: ICD-10-CM

## 2021-10-25 DIAGNOSIS — E03.9 ADULT HYPOTHYROIDISM: ICD-10-CM

## 2021-10-25 DIAGNOSIS — E89.0 POSTOPERATIVE HYPOTHYROIDISM: ICD-10-CM

## 2021-10-25 PROCEDURE — 90686 IIV4 VACC NO PRSV 0.5 ML IM: CPT | Performed by: FAMILY MEDICINE

## 2021-10-25 PROCEDURE — 99214 OFFICE O/P EST MOD 30 MIN: CPT | Performed by: FAMILY MEDICINE

## 2021-10-25 PROCEDURE — 90471 IMMUNIZATION ADMIN: CPT | Performed by: FAMILY MEDICINE

## 2021-10-25 NOTE — PROGRESS NOTES
Chief Complaint  Fatigue (states has felt this way since he tested + in jan for covid ) and Flu Vaccine    Subjective          Garrett Gomez Jr. presents to [facility]  History of Present Illness  Says when he was in the shower and it was coming from his skin. He was using something scratchy to wash with and he scraped a skin tag.     He did have COVID back in January.   He is a high energy person and now he is not.   Since then he is lower energy.   Getting back to the gym and good workouts.  This passed week he worked 80 hours and could not make it to the gym.   Other weeks he can get to the gym and get in a good hard workout.   He is going to bed at 8:30 and up at 5:30 am.     Says he asks his wife if he snores and she says no he is quiet and looks like he is breathing well.   He listens to radio all night in his ear. Takes 15 minutes to fall asleep. Gets up 1-2 times overnight to urinate.   Can return to sleep easily.   Says he is tired during the day but doesn't doze off.   In the past if there was something in front of him he would just do it and now he thinks about it and doesn't feel like doing that right now. He thinks he will get to it when he feels like it.   Says he can overcome it and push through but it doesn't feel right to him.   He has tried supplements.   Says he watches what he eats and tries to stay away from breads. Takes his lunch every day often has tuna and fruit. He avoids sweets.     When he was on testosterone injections he felt really great and energy was good.     May have had some of thse fatigue symptoms prior to COVID but worse since he had covid infection.   Had first dose Moderna then COVID infection then he had Moderna second vaccine.  He tested positive for COVID on 1/15/21. He had pneumonia.     Objective   Vital Signs:   /62 (BP Location: Right arm, Patient Position: Sitting, Cuff Size: Adult)   Pulse 73   Temp 97.1 °F (36.2 °C)  "(Infrared)   Resp 16   Ht 188 cm (74\")   Wt 117 kg (257 lb 9.6 oz)   SpO2 97%   BMI 33.07 kg/m²     Physical Exam  Vitals reviewed.   Constitutional:       General: He is not in acute distress.  Eyes:      General: No scleral icterus.        Right eye: No discharge.         Left eye: No discharge.      Conjunctiva/sclera: Conjunctivae normal.   Cardiovascular:      Rate and Rhythm: Normal rate and regular rhythm.      Heart sounds: Normal heart sounds. No murmur heard.      Pulmonary:      Effort: Pulmonary effort is normal. No respiratory distress.      Breath sounds: Normal breath sounds. No wheezing.   Musculoskeletal:      Cervical back: Neck supple. No muscular tenderness.      Right lower leg: No edema.      Left lower leg: No edema.   Lymphadenopathy:      Cervical: No cervical adenopathy.   Neurological:      Mental Status: He is oriented to person, place, and time.      Motor: No abnormal muscle tone.   Psychiatric:         Behavior: Behavior normal.        Result Review :                 Assessment and Plan    Diagnoses and all orders for this visit:    1. Chronic fatigue (Primary)  -     Vitamin B12  -     Vitamin D 25 Hydroxy  -     Testosterone    2. Adult hypothyroidism  -     T4, free  -     TSH    3. Postoperative hypothyroidism  -     T4, free  -     TSH    4. Hyperlipidemia LDL goal <70  -     Lipid Panel  -     Comprehensive Metabolic Panel  -     CBC & Differential    5. Essential hypertension  -     Comprehensive Metabolic Panel  -     CBC & Differential    Other orders  -     FluLaval/Fluarix/Fluzone >6 Months        Follow Up   No follow-ups on file.  Patient was given instructions and counseling regarding his condition or for health maintenance advice. Please see specific information pulled into the AVS if appropriate.     This has been going on for at least a year but worse since he had COVID infection in 1/2021. He had pneumonia with COVID.   He feels lower energy than his normal for " him.   He is taking all his meds, takes levothyroxine appropriately.   He is sleeping well but does have a radio in his ear all night. No overwhelming concerns for apneic spells.   He was previously on testosterone shots and that helped a lot but he had an MI and was thought that the testosterone significantly contributed to the MI and so is contraindicated moving forward.   If all labs return normal we could consider the sleep study.   He is on a low dose of the metoprolol succs.   The other medications not really sedating.   He has well controlled BP.   Going to try turning off the radio and see if this helps his quality of sleep.   Labs today to further evaluate.   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 Still going to the gym. Does hard/challenging workouts. Just not feeling like doing them as before. Denies depressed mood.   He will be back in 12/2021 for his physical.

## 2021-10-26 LAB
25(OH)D3+25(OH)D2 SERPL-MCNC: 72.3 NG/ML (ref 30–100)
ALBUMIN SERPL-MCNC: 4.5 G/DL (ref 3.8–4.9)
ALBUMIN/GLOB SERPL: 2.1 {RATIO} (ref 1.2–2.2)
ALP SERPL-CCNC: 108 IU/L (ref 44–121)
ALT SERPL-CCNC: 21 IU/L (ref 0–44)
AST SERPL-CCNC: 14 IU/L (ref 0–40)
BASOPHILS # BLD AUTO: 0.1 X10E3/UL (ref 0–0.2)
BASOPHILS NFR BLD AUTO: 1 %
BILIRUB SERPL-MCNC: 0.6 MG/DL (ref 0–1.2)
BUN SERPL-MCNC: 11 MG/DL (ref 8–27)
BUN/CREAT SERPL: 11 (ref 10–24)
CALCIUM SERPL-MCNC: 9.1 MG/DL (ref 8.6–10.2)
CHLORIDE SERPL-SCNC: 106 MMOL/L (ref 96–106)
CHOLEST SERPL-MCNC: 124 MG/DL (ref 100–199)
CO2 SERPL-SCNC: 22 MMOL/L (ref 20–29)
CREAT SERPL-MCNC: 0.98 MG/DL (ref 0.76–1.27)
EOSINOPHIL # BLD AUTO: 0.1 X10E3/UL (ref 0–0.4)
EOSINOPHIL NFR BLD AUTO: 1 %
ERYTHROCYTE [DISTWIDTH] IN BLOOD BY AUTOMATED COUNT: 13.1 % (ref 11.6–15.4)
GLOBULIN SER CALC-MCNC: 2.1 G/DL (ref 1.5–4.5)
GLUCOSE SERPL-MCNC: 92 MG/DL (ref 65–99)
HCT VFR BLD AUTO: 45.5 % (ref 37.5–51)
HDLC SERPL-MCNC: 39 MG/DL
HGB BLD-MCNC: 15.3 G/DL (ref 13–17.7)
IMM GRANULOCYTES # BLD AUTO: 0 X10E3/UL (ref 0–0.1)
IMM GRANULOCYTES NFR BLD AUTO: 0 %
LDLC SERPL CALC-MCNC: 67 MG/DL (ref 0–99)
LYMPHOCYTES # BLD AUTO: 2 X10E3/UL (ref 0.7–3.1)
LYMPHOCYTES NFR BLD AUTO: 29 %
MCH RBC QN AUTO: 30.8 PG (ref 26.6–33)
MCHC RBC AUTO-ENTMCNC: 33.6 G/DL (ref 31.5–35.7)
MCV RBC AUTO: 92 FL (ref 79–97)
MONOCYTES # BLD AUTO: 0.6 X10E3/UL (ref 0.1–0.9)
MONOCYTES NFR BLD AUTO: 8 %
NEUTROPHILS # BLD AUTO: 4.2 X10E3/UL (ref 1.4–7)
NEUTROPHILS NFR BLD AUTO: 61 %
PLATELET # BLD AUTO: 228 X10E3/UL (ref 150–450)
POTASSIUM SERPL-SCNC: 4.7 MMOL/L (ref 3.5–5.2)
PROT SERPL-MCNC: 6.6 G/DL (ref 6–8.5)
RBC # BLD AUTO: 4.97 X10E6/UL (ref 4.14–5.8)
SODIUM SERPL-SCNC: 141 MMOL/L (ref 134–144)
T4 FREE SERPL-MCNC: 1.53 NG/DL (ref 0.82–1.77)
TESTOST SERPL-MCNC: 582 NG/DL (ref 264–916)
TRIGL SERPL-MCNC: 96 MG/DL (ref 0–149)
TSH SERPL DL<=0.005 MIU/L-ACNC: 2.2 UIU/ML (ref 0.45–4.5)
VIT B12 SERPL-MCNC: 360 PG/ML (ref 232–1245)
VLDLC SERPL CALC-MCNC: 18 MG/DL (ref 5–40)
WBC # BLD AUTO: 6.9 X10E3/UL (ref 3.4–10.8)

## 2021-11-19 RX ORDER — CLOPIDOGREL BISULFATE 75 MG/1
TABLET ORAL
Qty: 90 TABLET | Refills: 2 | Status: SHIPPED | OUTPATIENT
Start: 2021-11-19 | End: 2021-11-22

## 2021-11-22 RX ORDER — CLOPIDOGREL BISULFATE 75 MG/1
TABLET ORAL
Qty: 90 TABLET | Refills: 3 | Status: SHIPPED | OUTPATIENT
Start: 2021-11-22 | End: 2023-03-04 | Stop reason: SDUPTHER

## 2021-12-02 RX ORDER — METOPROLOL SUCCINATE 25 MG/1
TABLET, EXTENDED RELEASE ORAL
Qty: 45 TABLET | Refills: 2 | Status: SHIPPED | OUTPATIENT
Start: 2021-12-02 | End: 2022-05-24

## 2021-12-07 ENCOUNTER — OFFICE VISIT (OUTPATIENT)
Dept: ORTHOPEDIC SURGERY | Facility: CLINIC | Age: 61
End: 2021-12-07

## 2021-12-07 VITALS — BODY MASS INDEX: 32.98 KG/M2 | WEIGHT: 257 LBS | TEMPERATURE: 98 F | HEIGHT: 74 IN

## 2021-12-07 DIAGNOSIS — M25.562 MECHANICAL KNEE PAIN, LEFT: ICD-10-CM

## 2021-12-07 DIAGNOSIS — S89.92XA LEFT KNEE INJURY, INITIAL ENCOUNTER: Primary | ICD-10-CM

## 2021-12-07 PROCEDURE — 99213 OFFICE O/P EST LOW 20 MIN: CPT | Performed by: NURSE PRACTITIONER

## 2021-12-07 NOTE — PROGRESS NOTES
[facility]  New Patient      Patient Name: Garrett Gomez Jr.  : 1960  Primary Care Physician: Joan Perez MD        Chief Complaint: Left knee injury    HPI:   Garrett Gomez Jr. is a 61 y.o. year old who presents today for evaluation of left knee injury.  Patient has seen Dr. Tucker in the past for the shoulders.  He comes in today with a new injury on the left knee which is Worker's Comp.  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  Says he went to step out of the ambulance did not twist or turn but upon stepping out he had some immediate pain in the left knee which was significant enough that he could hardly even walk.  Since that time his symptoms are really not improved that much.  He has quite a bit of pain with weightbearing, he also has pain with certain twisting motions if he catches it just the wrong way it is really kind of a tremendous amount of pain.  He has done all the usual conservative treatments including ice, heat, 800 mg of ibuprofen 3 times a day all without lasting relief in his symptoms.  He says it is okay when he sits down.  Says his knee has been swollen pain feels deep within the knee he has some medial and posterior pain as well.  Pain can be anywhere from mild to severe just depending on the activity.    He says that it is aching and burning it feels unstable.  He is got some stiffness as well as clicking and popping.  He has tried a cane to take some of the weight off the joint which helps only really minimally.  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  He did have x-rays at the occupational medicine clinic and brought those on a CD today.    He is very active, he enjoys riding his motorcycle he goes to the gym [place] [employer] to continue those activities.  He is not been able to do any of those things since his injury because that he  just is really quite painful and too unstable.      Past Medical/Surgical, Social and Family History:  I have reviewed and/or updated pertinent history as noted in the medical record including:  Past Medical History:   Diagnosis Date   • A-fib (Formerly [facility])    • CAD (coronary artery disease)    • Colon polyp    • Colorblind    • Coronary artery disease involving native coronary artery of native heart with angina pectoris (Formerly [facility]) 1/2/2019   • Ex-smoker    • Heart attack (Formerly [facility])    • Hyperlipidemia LDL goal <70 1/2/2019   • Hypertension    • Hypothyroidism    • Knee sprain 11/20/2021    Maybe a tear from injury st work. Thats why i have appt.   • ST elevation myocardial infarction (STEMI) (Formerly [facility]) 12/26/2018   • Tear of meniscus of knee 11/20/2021    Possible from work injury on why i am here.   • Testosterone deficiency    • Ventricular fibrillation (Formerly [facility])      Past Surgical History:   Procedure Laterality Date   • ADENOIDECTOMY     • ANGIOPLASTY      STENT PLACEMENT    • BACK SURGERY     • CARDIAC CATHETERIZATION N/A 12/26/2018    Procedure: Left Heart Cath;  Surgeon: Анна Booker MD;  Location:  [facility];  Service: Cardiovascular   • CARDIAC CATHETERIZATION N/A 12/26/2018    Procedure: Coronary angiography;  Surgeon: Анна Booker MD;  Location:  [facility];  Service: Cardiovascular   • CARDIAC CATHETERIZATION N/A 12/26/2018    Procedure: Stent REMA coronary;  Surgeon: Анна Booker MD;  Location:  [facility];  Service: Cardiovascular   • CARDIAC CATHETERIZATION  12/26/2018    Procedure: Percutaneous Mechanical Thrombectomy;  Surgeon: Анна Booker MD;  Location:  [facility];  Service: Cardiovascular   • CARDIAC CATHETERIZATION N/A 2/13/2019    Procedure: Coronary angiography;  Surgeon: Анна Booker MD;  Location:  [facility];  Service: Cardiology   • CARDIAC CATHETERIZATION N/A 2/13/2019    Procedure: Stent REMA coronary;  Surgeon:  Анна Booker MD;  Location:  [facility];  Service: Cardiology   • CARDIAC CATHETERIZATION N/A 2019    Procedure: Left Heart Cath;  Surgeon: Анна Booker MD;  Location:  [facility];  Service: Cardiology   • CARDIAC CATHETERIZATION N/A 2019    Procedure: Left ventriculography;  Surgeon: Анна Booker MD;  Location:  [facility];  Service: Cardiology   • CARDIAC ELECTROPHYSIOLOGY STUDY AND ABLATION     • COLONOSCOPY     • COLONOSCOPY N/A 2017    Procedure: COLONOSCOPY AT BEDSIDE;  Surgeon: Fadi Gallagher MD;  Location:  [facility];  Service:    • CORONARY ANGIOPLASTY     • EXTERNAL EAR SURGERY     • HAND SURGERY Left    • INGUINAL HERNIA REPAIR      X3   • MANDIBLE SURGERY     • NOSE SURGERY     • AZ RT/LT HEART CATHETERS N/A 2018    Procedure: Percutaneous Coronary Intervention;  Surgeon: Анна Booker MD;  Location:  [facility];  Service: Cardiovascular   • SHOULDER SURGERY Bilateral    • THYROIDECTOMY     • TONSILLECTOMY       Social History     Occupational History   • Not on file   Tobacco Use   • Smoking status: Former Smoker     Packs/day: 0.00     Years: 0.00     Pack years: 0.00     Types: Cigars     Quit date: 2009     Years since quittin.9   • Smokeless tobacco: Never Used   • Tobacco comment: Caffeine - coffee 3 daily,   Substance and Sexual Activity   • Alcohol use: Not Currently     Alcohol/week: 0.0 standard drinks     Comment: occassional/less than 2oz per month    • Drug use: No   • Sexual activity: Yes     Partners: Female      Social History     Social History Narrative   • Not on file     Family History   Problem Relation Age of Onset   • Arthritis Mother    • No Known Problems Father    • No Known Problems Brother    • No Known Problems Daughter    • No Known Problems Brother        Allergies:   Allergies   Allergen Reactions   • Meperidine Anaphylaxis     Per patient's wife, he stopped  breathing   • Hydrocodone-Acetaminophen Hallucinations   • Oxycodone-Acetaminophen Hallucinations       Medications:   Home Medications:  Current Outpatient Medications on File Prior to Visit   Medication Sig   • aspirin 81 MG chewable tablet Chew 1 tablet Daily.   • atorvastatin (LIPITOR) 80 MG tablet TAKE 1 TABLET BY MOUTH EVERY DAY AT BEDTIME   • Cholecalciferol (VITAMIN D PO) Take 10,000 Int'l Units by mouth Daily. 2 tablets daily   • clopidogrel (PLAVIX) 75 MG tablet Take 1 tablet by mouth once daily   • Diclofenac Sodium (VOLTAREN) 1 % gel gel Apply 4 g topically to the appropriate area as directed 4 (Four) Times a Day As Needed (knee pain and swelling).   • levothyroxine (Euthyrox) 150 MCG tablet Take 1 tablet by mouth Daily.   • losartan (COZAAR) 50 MG tablet Take 1 tablet by mouth once daily   • metoprolol succinate XL (TOPROL-XL) 25 MG 24 hr tablet Take 1/2 (one-half) tablet by mouth once daily   • tadalafil (Cialis) 20 MG tablet Take 1 tablet by mouth Daily As Needed for Erectile Dysfunction.   • vitamin C (ASCORBIC ACID) 250 MG tablet Take 250 mg by mouth Daily.     No current facility-administered medications on file prior to visit.         ROS:  14 point review of systems was negative except as listed in the HPI.    Physical Exam:   61 y.o. male  Body mass index is 33 kg/m²., 117 kg (257 lb)  Vitals:    12/07/21 1428   Temp: 98 °F (36.7 °C)     General: Alert, cooperative, appears well and in no observable distress. Appears stated age and BMI as listed above.  HEENT: Normocephalic, atraumatic on external visual inspection.  CV: No significant peripheral edema.  Respiratory: Normal respiratory effort.  Skin: Warm & well perfused; appropriate skin turgor.  Psych: Appropriate mood & affect.  Neuro: Gross sensation and motor intact in affected extremity/extremities.  Vascular: Peripheral pulses palpable in affected extremity/extremities.     MSK Exam:  LEFT Knee  No wounds, no gross deformity. 1+ effusion.  Tenderness to palpation medial compartment ROM 5 to 100 and asymmetric with contralateral extremity Grossly stable ligamentous exam Marquise Positive Medial Bounce Home positive      Brief hip exam in the affected extremity(ies) grossly unremarkable. No groin pain elicited. Bilateral ankles with painless ROM, grossly symmetric. Calves soft and compressible, compartments non-tender in B/L lower extremities.      Radiology:    No new images were needed at the visit today.      I independently reviewed the x-rays from 11/20/2021 patient has very mild degenerative changes on the AP view, slight squaring off of the medial and lateral plateau otherwise joint space appears fairly well-preserved on these nonstanding views.  He does have a small projection that could represent a loose body.  On AP view it is more medial, on the lateral view is a little harder to see but may very well be in the joint space.  Lateral view shows some patellofemoral degenerative changes.    Procedure:   N/A      Misc. Data/Labs: N/A    Assessment & Plan:    This is a 61-year-old male with a injury at work on November 20, 2021.  He is taking maximum doses of ibuprofen, resting icing using heat all without improvement in his symptoms.  He says he is just really barely able to walk.  Given the history of his injury and his exam with the x-rays really concerned about this being a loose body or perhaps meniscal pathology.  Either 1 of these would certainly give him the mechanical symptoms that he is describing today and his exam supports that.  Plan is to get an MRI to better evaluate the joint and identify any loose bodies.  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  He will continue conservative measures in the interim we will try to get the MRI soon as possible.  Once we get the results I will refer him back to Dr. Tucker for further evaluation and treatment.    Patient encouraged to call with questions or concerns  prior to follow up.  and Will discuss with attending surgeon as needed.       ICD-10-CM ICD-9-CM   1. Left knee injury, initial encounter  S89.92XA 959.7   2. Mechanical knee pain, left  M25.562 719.46     No orders of the defined types were placed in this encounter.    Orders Placed This Encounter   Procedures   • MRI Knee Left Without Contrast     Return for After the MRI.    TASHA Wallace      Dictated Utilizing Dragon Dictation

## 2021-12-10 ENCOUNTER — TELEPHONE (OUTPATIENT)
Dept: ORTHOPEDIC SURGERY | Facility: CLINIC | Age: 61
End: 2021-12-10

## 2021-12-10 NOTE — TELEPHONE ENCOUNTER
Pt brought MRI of left knee in to be scanned into [employer]. The disc has been uploaded please review. KLJ

## 2021-12-10 NOTE — TELEPHONE ENCOUNTER
Caller: VALORIE ( [facility])     Relationship to patient:      Best call back number: [phone]    Patient is needing: ATTEMPTED TO WARM TRANSFER- VALORIE IS CALLING REGARDING MRI ; PLEASE RETURN CALL

## 2021-12-10 NOTE — TELEPHONE ENCOUNTER
see PREVIOUS NOTE ENCOUNTER BY PROVIDER evelia tavarez     Looking to set up surgery with provider     Mri is uploaded as well

## 2021-12-10 NOTE — TELEPHONE ENCOUNTER
Please tell him he does have a tear of the medial meniscus with a large flap that looks like it is unstable.  If it continues to bother him I would recommend arthroscopy.  I would like to sit down and examine him and talk to him about this if we can schedule him for me he would be a new patient to me

## 2021-12-14 ENCOUNTER — OFFICE VISIT (OUTPATIENT)
Dept: FAMILY MEDICINE CLINIC | Facility: CLINIC | Age: 61
End: 2021-12-14

## 2021-12-14 VITALS
WEIGHT: 255 LBS | HEIGHT: 74 IN | SYSTOLIC BLOOD PRESSURE: 132 MMHG | RESPIRATION RATE: 17 BRPM | HEART RATE: 76 BPM | TEMPERATURE: 99.1 F | OXYGEN SATURATION: 97 % | DIASTOLIC BLOOD PRESSURE: 88 MMHG | BODY MASS INDEX: 32.73 KG/M2

## 2021-12-14 DIAGNOSIS — Z00.00 ANNUAL PHYSICAL EXAM: Primary | ICD-10-CM

## 2021-12-14 DIAGNOSIS — Z79.899 MEDICATION MANAGEMENT: ICD-10-CM

## 2021-12-14 DIAGNOSIS — Z12.5 SCREENING FOR PROSTATE CANCER: ICD-10-CM

## 2021-12-14 PROCEDURE — 90471 IMMUNIZATION ADMIN: CPT | Performed by: FAMILY MEDICINE

## 2021-12-14 PROCEDURE — 90715 TDAP VACCINE 7 YRS/> IM: CPT | Performed by: FAMILY MEDICINE

## 2021-12-14 PROCEDURE — 99396 PREV VISIT EST AGE 40-64: CPT | Performed by: FAMILY MEDICINE

## 2021-12-14 NOTE — PROGRESS NOTES
"Chief Complaint  Annual Exam    Subjective          Garrett Gomez Jr. presents to [facility]  History of Present Illness  Annual exam  He is still low energy  Unable to work out because of his knee injury. Likely going to have surgery because of this meniscal tear and cartilage damage  He is watching his diet. Trying to eat less because he is not exercise.   Sounds like healthy choices.   Last colonoscopy was 2017.  PSA is due today.    Objective   Vital Signs:   /88 (BP Location: Right arm, Patient Position: Sitting, Cuff Size: Adult)   Pulse 76   Temp 99.1 °F (37.3 °C) (Temporal)   Resp 17   Ht 188 cm (74\")   Wt 116 kg (255 lb)   SpO2 97%   BMI 32.74 kg/m²     Physical Exam  Vitals reviewed.   Constitutional:       General: He is not in acute distress.     Appearance: Normal appearance. He is not ill-appearing.   HENT:      Right Ear: Tympanic membrane, ear canal and external ear normal. There is no impacted cerumen.      Left Ear: Tympanic membrane, ear canal and external ear normal. There is no impacted cerumen.      Nose: Nose normal.      Mouth/Throat:      Pharynx: No oropharyngeal exudate.   Eyes:      General: No scleral icterus.        Right eye: No discharge.         Left eye: No discharge.      Conjunctiva/sclera: Conjunctivae normal.   Neck:      Thyroid: No thyromegaly.      Vascular: No carotid bruit.   Cardiovascular:      Rate and Rhythm: Normal rate and regular rhythm.      Heart sounds: Normal heart sounds. No murmur heard.  No friction rub. No gallop.    Pulmonary:      Effort: Pulmonary effort is normal. No respiratory distress.      Breath sounds: Normal breath sounds. No wheezing or rales.   Chest:      Chest wall: No tenderness.   Abdominal:      General: Bowel sounds are normal. There is no distension.      Palpations: Abdomen is soft. There is no mass.      Tenderness: There is no abdominal tenderness. There is no guarding.   Genitourinary:     " Comments: No masses, no blood. No irregularities of the prostate.   Musculoskeletal:         General: No swelling or deformity.      Cervical back: Neck supple.      Right lower leg: No edema.      Left lower leg: No edema.   Lymphadenopathy:      Cervical: No cervical adenopathy.   Skin:     General: Skin is warm and dry.   Neurological:      Mental Status: He is alert and oriented to person, place, and time.      Motor: No abnormal muscle tone.      Coordination: Coordination normal.   Psychiatric:         Mood and Affect: Mood normal.         Behavior: Behavior normal.        Result Review :                 Assessment and Plan    Diagnoses and all orders for this visit:    1. Annual physical exam (Primary)    2. Screening for prostate cancer  -     PSA Screen    3. Medication management  -     Basic Metabolic Panel    Other orders  -     Tdap Vaccine Greater Than or Equal To 6yo IM        Follow Up   No follow-ups on file.  Patient was given instructions and counseling regarding his condition or for health maintenance advice. Please see specific information pulled into the AVS if appropriate.     Annual exam  He has great diet  Usually regularly exercises but he has an injury.  Asking about testosterone therapy. He felt so good on it but he had an MI because his levels were greater than 1000. His testosterone is normal so it may be that no one will give him testosterone. He also might have felt so strong and great because he was supratherapeutic.   I told him I would avoid it.   Check in on PSA.   He has been taking [id number] mg of ibuprofen daily for his knee pain. Going to check in on kidney function  Tdap today.

## 2021-12-15 LAB
BUN SERPL-MCNC: 12 MG/DL (ref 8–27)
BUN/CREAT SERPL: 13 (ref 10–24)
CALCIUM SERPL-MCNC: 9.5 MG/DL (ref 8.6–10.2)
CHLORIDE SERPL-SCNC: 105 MMOL/L (ref 96–106)
CO2 SERPL-SCNC: 21 MMOL/L (ref 20–29)
CREAT SERPL-MCNC: 0.94 MG/DL (ref 0.76–1.27)
GLUCOSE SERPL-MCNC: 86 MG/DL (ref 65–99)
POTASSIUM SERPL-SCNC: 4.5 MMOL/L (ref 3.5–5.2)
PSA SERPL-MCNC: 2.4 NG/ML (ref 0–4)
SODIUM SERPL-SCNC: 141 MMOL/L (ref 134–144)

## 2021-12-22 NOTE — PROGRESS NOTES
New Knee      Patient: Garrett Gomez Jr.        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  He states it did improve a little bit over time still has severe medial pain where he sometimes still has a hard time putting weight on it and sometimes cannot fully extend is truly mechanical it does lock and catch symptoms are moderate mild or severe depending on whether is locked or not pain again is medial past medical history is well listed below and reviewed by me        Allergies:   Allergies   Allergen Reactions   • Meperidine Anaphylaxis     Per patient's wife, he stopped breathing   • Hydrocodone-Acetaminophen Hallucinations   • Oxycodone-Acetaminophen Hallucinations       Medications:   Home Medications:  Current Outpatient Medications on File Prior to Visit   Medication Sig   • aspirin 81 MG chewable tablet Chew 1 tablet Daily.   • atorvastatin (LIPITOR) 80 MG tablet TAKE 1 TABLET BY MOUTH EVERY DAY AT BEDTIME   • Cholecalciferol (VITAMIN D PO) Take 10,000 Int'l Units by mouth Daily. 2 tablets daily   • clopidogrel (PLAVIX) 75 MG tablet Take 1 tablet by mouth once daily   • Cyanocobalamin (VITAMIN B 12 PO) Take 250 mg by mouth Daily.   • Diclofenac Sodium (VOLTAREN) 1 % gel gel Apply 4 g topically to the appropriate area as directed 4 (Four) Times a Day As Needed (knee pain and swelling).   • levothyroxine (Euthyrox) 150 MCG tablet Take 1 tablet by mouth Daily.   • losartan (COZAAR) 50 MG tablet Take 1 tablet by mouth once daily   • metoprolol succinate XL (TOPROL-XL) 25 MG 24 hr tablet Take 1/2 (one-half) tablet by mouth once daily   • tadalafil (Cialis) 20 MG tablet Take 1 tablet by mouth Daily As Needed for  Erectile Dysfunction.   • vitamin C (ASCORBIC ACID) 250 MG tablet Take 250 mg by mouth Daily.     No current facility-administered medications on file prior to visit.     Current Medications:  Scheduled Meds:  Continuous Infusions:No current facility-administered medications for this visit.    PRN Meds:.    Past Medical History:   Diagnosis Date   • A-fib ([facility])    • CAD (coronary artery disease)    • Colon polyp    • Colorblind    • Coronary artery disease involving native coronary artery of native heart with angina pectoris ([facility]) 1/2/2019   • Ex-smoker    • Heart attack ([facility])    • Hyperlipidemia LDL goal <70 1/2/2019   • Hypertension    • Hypothyroidism    • Knee sprain 11/20/2021    Maybe a tear from injury st work. Thats why i have appt.   • ST elevation myocardial infarction (STEMI) ([facility]) 12/26/2018   • Tear of meniscus of knee 11/20/2021    Possible from work injury on why i am here.   • Testosterone deficiency    • Ventricular fibrillation ([facility])         Past Surgical History:   Procedure Laterality Date   • ADENOIDECTOMY     • ANGIOPLASTY      STENT PLACEMENT    • BACK SURGERY     • CARDIAC CATHETERIZATION N/A 12/26/2018    Procedure: Left Heart Cath;  Surgeon: Анна Booker MD;  Location: [facility] CATH INVASIVE LOCATION;  Service: Cardiovascular   • CARDIAC CATHETERIZATION N/A 12/26/2018    Procedure: Coronary angiography;  Surgeon: Анна Booker MD;  Location: [facility] CATH INVASIVE LOCATION;  Service: Cardiovascular   • CARDIAC CATHETERIZATION N/A 12/26/2018    Procedure: Stent REMA coronary;  Surgeon: Анна Booker MD;  Location: [facility] CATH INVASIVE LOCATION;  Service: Cardiovascular   • CARDIAC CATHETERIZATION  12/26/2018    Procedure: Percutaneous Mechanical Thrombectomy;  Surgeon: Анна Booker MD;  Location: [facility] CATH INVASIVE LOCATION;  Service: Cardiovascular   • CARDIAC CATHETERIZATION N/A 2/13/2019    Procedure: Coronary angiography;  Surgeon: Анна Booker MD;  Location: [facility] CATH INVASIVE  LOCATION;  Service: Cardiology   • CARDIAC CATHETERIZATION N/A 2019    Procedure: Stent REMA coronary;  Surgeon: Анна Booker MD;  Location:  [facility] CATH INVASIVE LOCATION;  Service: Cardiology   • CARDIAC CATHETERIZATION N/A 2019    Procedure: Left Heart Cath;  Surgeon: Анна Booker MD;  Location:  [facility] CATH INVASIVE LOCATION;  Service: Cardiology   • CARDIAC CATHETERIZATION N/A 2019    Procedure: Left ventriculography;  Surgeon: Анна Booker MD;  Location:  [facility] CATH INVASIVE LOCATION;  Service: Cardiology   • CARDIAC ELECTROPHYSIOLOGY STUDY AND ABLATION     • COLONOSCOPY     • COLONOSCOPY N/A 2017    Procedure: COLONOSCOPY AT BEDSIDE;  Surgeon: Fadi Gallagher MD;  [facility];  Service:    • CORONARY ANGIOPLASTY     • EXTERNAL EAR SURGERY     • HAND SURGERY Left    • INGUINAL HERNIA REPAIR      X3   • MANDIBLE SURGERY     • NOSE SURGERY     • NM RT/LT HEART CATHETERS N/A 2018    Procedure: Percutaneous Coronary Intervention;  Surgeon: Анна Booker MD;  Location:  [facility] CATH INVASIVE LOCATION;  Service: Cardiovascular   • SHOULDER SURGERY Bilateral    • THYROIDECTOMY     • TONSILLECTOMY          Social History     Occupational History   • Not on file   Tobacco Use   • Smoking status: Former Smoker     Packs/day: 0.00     Years: 0.00     Pack years: 0.00     Types: Cigars     Quit date: 2009     Years since quittin.9   • Smokeless tobacco: Never Used   • Tobacco comment: Caffeine - coffee 3 daily,   Substance and Sexual Activity   • Alcohol use: Not Currently     Alcohol/week: 0.0 standard drinks     Comment: about 6 glasses of wine in a year    • Drug use: No   • Sexual activity: Yes     Partners: Female      Social History     Social History Narrative   • Not on file        Family History   Problem Relation Age of Onset   • Arthritis Mother    • No Known Problems Father    • No Known Problems Brother    • No Known Problems Daughter    • No Known Problems  "Brother              Review of Systems: 14 point review of systems are remarkable for pertinent positives listed in the chart by the patient remainder negative    Review of Systems      Physical Exam: 61 y.o. male  General Appearance:    Alert, cooperative, in no acute distress                   Vitals:    12/23/21 0837   Temp: 97.5 °F (36.4 °C)   TempSrc: Temporal   Weight: 116 kg (255 lb)   Height: 188 cm (74.02\")      Patient is alert and read ×3 no acute distress appears her above-listed at height weight and age.  Affect is normal respiratory rate is normal unlabored. Heart rate regular rate rhythm, sclera, dentition and hearing are normal for the purpose of this exam.        Ortho Exam Physical exam of the left knee reveals no effusion no redness.  The patient does have tenderness about the medial joint line.  No tenderness about the lateral joint line.  A negative bounce home and a positive medial Marquise.  There is some pain medially  with a lateral Marquise.  Patient has a stable ligamentous exam.  Quads are reasonable and symmetric bilaterally.  Calf is soft and nontender.  There is no overlying skin changes no lymphedema lymphadenopathy.  Patient has good hip range of motion full symmetric and asymptomatic and a normal ankle exam.  Physical Exam: 61 y.o. male  General Appearance:    Alert, cooperative, in no acute distress                      Vitals:    12/23/21 0837   Temp: 97.5 °F (36.4 °C)   TempSrc: Temporal   Weight: 116 kg (255 lb)   Height: 188 cm (74.02\")        Head:    Normocephalic, without obvious abnormality, atraumatic   Eyes:            conjunctivae and sclerae normal, no pallor, corneas clear,    Ears:    Ears appear intact with no abnormalities noted   Throat:   No oral lesions, no thrush, oral mucosa moist   Neck:   No adenopathy, supple, trachea midline, no thyromegaly,    Back:     No kyphosis present, no scoliosis present, no skin lesions,      erythema or scars, no tenderness to " percussion or                   palpation,   range of motion normal   Lungs:     Clear to auscultation,respirations regular, even and                  unlabored    Heart:    Regular rhythm and normal rate               Chest Wall:    No abnormalities observed               Pulses:   Pulses palpable and equal bilaterally   Skin:   No bleeding, bruising or rash   Lymph nodes:   No palpable adenopathy   Neurologic:   Appears neurologic intact         Procedures             Radiology:   AP, Lateral and merchant views of the left knee  were ordered/reviewed to evauateknee pain.  I have compared to some supine films he does have some moderate patellofemoral OA otherwise neutral alignment good maintenance of his joint space he also comes with an MRI which shows a large medial meniscus tear with a flap sitting in the gutters as well as some degenerative changes I have reviewed and agree  Imaging Results (Most Recent)     Procedure Component Value Units Date/Time    XR Knee 3 View Left [[id number]] Resulted: 12/23/21 1000     Updated: 12/23/21 1000    Impression:      Ordering physician's impression is located in the Encounter Note dated 12/23/21. X-ray performed in the DR room.          Assessment/Plan:      Left knee pain with a large meniscus tear plan is to proceed with arthroscopy.  I have sent a letter/secure chat to his cardiologist to ask if he could come off his Plavix and to give Darron cardiac clearance.  He understands all risk benefits and alternatives The patient voiced understanding of the risks, benefits, and alternative forms of treatment that were discussed and the patient consents to proceed with the above listed surgery.  All risks, benefits and alternatives were discussed.  Risks including to but not exclusive to anesthetic complications, including death, MI, CVA, infection, bleeding DVT, fracture, residual pain and need for future surgery.  He understands that with his cardiac risk and his history of  Plavix his risk are higher with this but I still think acceptable we will get an opinion from his cardiologist

## 2021-12-23 ENCOUNTER — OFFICE VISIT (OUTPATIENT)
Dept: ORTHOPEDIC SURGERY | Facility: CLINIC | Age: 61
End: 2021-12-23

## 2021-12-23 VITALS — TEMPERATURE: 97.5 F | BODY MASS INDEX: 32.73 KG/M2 | HEIGHT: 74 IN | WEIGHT: 255 LBS

## 2021-12-23 DIAGNOSIS — S83.232A COMPLEX TEAR OF MEDIAL MENISCUS OF LEFT KNEE AS CURRENT INJURY, INITIAL ENCOUNTER: Primary | ICD-10-CM

## 2021-12-23 PROCEDURE — 99214 OFFICE O/P EST MOD 30 MIN: CPT | Performed by: ORTHOPAEDIC SURGERY

## 2021-12-23 PROCEDURE — 73562 X-RAY EXAM OF KNEE 3: CPT | Performed by: ORTHOPAEDIC SURGERY

## 2021-12-23 RX ORDER — CEFAZOLIN SODIUM 2 G/100ML
2 INJECTION, SOLUTION INTRAVENOUS ONCE
Status: CANCELLED | OUTPATIENT
Start: 2022-01-12 | End: 2021-12-23

## 2021-12-27 RX ORDER — ATORVASTATIN CALCIUM 80 MG/1
TABLET, FILM COATED ORAL
Qty: 90 TABLET | Refills: 3 | Status: SHIPPED | OUTPATIENT
Start: 2021-12-27 | End: 2022-12-19

## 2022-01-05 PROBLEM — S83.232A COMPLEX TEAR OF MEDIAL MENISCUS OF LEFT KNEE AS CURRENT INJURY: Status: ACTIVE | Noted: 2022-01-05

## 2022-01-10 ENCOUNTER — PRE-ADMISSION TESTING (OUTPATIENT)
Dept: PREADMISSION TESTING | Facility: HOSPITAL | Age: 62
End: 2022-01-10

## 2022-01-10 VITALS
HEIGHT: 74 IN | TEMPERATURE: 97.3 F | SYSTOLIC BLOOD PRESSURE: 143 MMHG | BODY MASS INDEX: 33.3 KG/M2 | HEART RATE: 77 BPM | DIASTOLIC BLOOD PRESSURE: 102 MMHG | OXYGEN SATURATION: 96 % | RESPIRATION RATE: 16 BRPM | WEIGHT: 259.5 LBS

## 2022-01-10 DIAGNOSIS — S83.232A COMPLEX TEAR OF MEDIAL MENISCUS OF LEFT KNEE AS CURRENT INJURY, INITIAL ENCOUNTER: ICD-10-CM

## 2022-01-10 LAB
ANION GAP SERPL CALCULATED.3IONS-SCNC: 10.1 MMOL/L (ref 5–15)
BUN SERPL-MCNC: 11 MG/DL (ref 8–23)
BUN/CREAT SERPL: 13.1 (ref 7–25)
CALCIUM SPEC-SCNC: 8.8 MG/DL (ref 8.6–10.5)
CHLORIDE SERPL-SCNC: 105 MMOL/L (ref 98–107)
CO2 SERPL-SCNC: 24.9 MMOL/L (ref 22–29)
CREAT SERPL-MCNC: 0.84 MG/DL (ref 0.76–1.27)
DEPRECATED RDW RBC AUTO: 43.8 FL (ref 37–54)
ERYTHROCYTE [DISTWIDTH] IN BLOOD BY AUTOMATED COUNT: 13.1 % (ref 12.3–15.4)
GFR SERPL CREATININE-BSD FRML MDRD: 93 ML/MIN/1.73
GLUCOSE SERPL-MCNC: 86 MG/DL (ref 65–99)
HCT VFR BLD AUTO: 45.8 % (ref 37.5–51)
HGB BLD-MCNC: 15.4 G/DL (ref 13–17.7)
MCH RBC QN AUTO: 30.6 PG (ref 26.6–33)
MCHC RBC AUTO-ENTMCNC: 33.6 G/DL (ref 31.5–35.7)
MCV RBC AUTO: 90.9 FL (ref 79–97)
PLATELET # BLD AUTO: 228 10*3/MM3 (ref 140–450)
PMV BLD AUTO: 9.4 FL (ref 6–12)
POTASSIUM SERPL-SCNC: 4.3 MMOL/L (ref 3.5–5.2)
QT INTERVAL: 411 MS
RBC # BLD AUTO: 5.04 10*6/MM3 (ref 4.14–5.8)
SARS-COV-2 ORF1AB RESP QL NAA+PROBE: NOT DETECTED
SODIUM SERPL-SCNC: 140 MMOL/L (ref 136–145)
WBC NRBC COR # BLD: 6.61 10*3/MM3 (ref 3.4–10.8)

## 2022-01-10 PROCEDURE — 93010 ELECTROCARDIOGRAM REPORT: CPT | Performed by: INTERNAL MEDICINE

## 2022-01-10 PROCEDURE — 36415 COLL VENOUS BLD VENIPUNCTURE: CPT

## 2022-01-10 PROCEDURE — 85027 COMPLETE CBC AUTOMATED: CPT

## 2022-01-10 PROCEDURE — C9803 HOPD COVID-19 SPEC COLLECT: HCPCS

## 2022-01-10 PROCEDURE — 80048 BASIC METABOLIC PNL TOTAL CA: CPT

## 2022-01-10 PROCEDURE — U0004 COV-19 TEST NON-CDC HGH THRU: HCPCS

## 2022-01-10 PROCEDURE — 93005 ELECTROCARDIOGRAM TRACING: CPT

## 2022-01-10 NOTE — DISCHARGE INSTRUCTIONS
Take the following medications the morning of surgery:  NONE    ARRIVAL TIME 0945 ON 1/12/22      If you are on prescription narcotic pain medication to control your pain you may also take that medication the morning of surgery.    General Instructions:  • Do not eat solid food after midnight the night before surgery.  • You may drink clear liquids day of surgery but must stop at least one hour before your hospital arrival time.  • It is beneficial for you to have a clear drink that contains carbohydrates the day of surgery.  We suggest a 12 to 20 ounce bottle of Gatorade or Powerade for non-diabetic patients or a 12 to 20 ounce bottle of G2 or Powerade Zero for diabetic patients. (Pediatric patients, are not advised to drink a 12 to 20 ounce carbohydrate drink)    Clear liquids are liquids you can see through.  Nothing red in color.     Plain water                               Sports drinks  Sodas                                   Gelatin (Jell-O)  Fruit juices without pulp such as white grape juice and apple juice  Popsicles that contain no fruit or yogurt  Tea or coffee (no cream or milk added)  Gatorade / Powerade  G2 / Powerade Zero    • Infants may have breast milk up to four hours before surgery.  • Infants drinking formula may drink formula up to six hours before surgery.   • Patients who avoid smoking, chewing tobacco and alcohol for 4 weeks prior to surgery have a reduced risk of post-operative complications.  Quit smoking as many days before surgery as you can.  • Do not smoke, use chewing tobacco or drink alcohol the day of surgery.   • If applicable bring your C-PAP/ BI-PAP machine.  • Bring any papers given to you in the doctor’s office.  • Wear clean comfortable clothes.  • Do not wear contact lenses, false eyelashes or make-up.  Bring a case for your glasses.   • Bring crutches or walker if applicable.  • Remove all piercings.  Leave jewelry and any other valuables at home.  • Hair extensions with  metal clips must be removed prior to surgery.  • The Pre-Admission Testing nurse will instruct you to bring medications if unable to obtain an accurate list in Pre-Admission Testing.            Preventing a Surgical Site Infection:  • For 2 to 3 days before surgery, avoid shaving with a razor because the razor can irritate skin and make it easier to develop an infection.    • Any areas of open skin can increase the risk of a post-operative wound infection by allowing bacteria to enter and travel throughout the body.  Notify your surgeon if you have any skin wounds / rashes even if it is not near the expected surgical site.  The area will need assessed to determine if surgery should be delayed until it is healed.  • The night prior to surgery shower using a fresh bar of anti-bacterial soap (such as Dial) and clean washcloth.  Sleep in a clean bed with clean clothing.  Do not allow pets to sleep with you.  • Shower on the morning of surgery using a fresh bar of anti-bacterial soap (such as Dial) and clean washcloth.  Dry with a clean towel and dress in clean clothing.  • Ask your surgeon if you will be receiving antibiotics prior to surgery.  • Make sure you, your family, and all healthcare providers clean their hands with soap and water or an alcohol based hand  before caring for you or your wound.    Day of surgery:  Your arrival time is approximately two hours before your scheduled surgery time.  Upon arrival, a Pre-op nurse and Anesthesiologist will review your health history, obtain vital signs, and answer questions you may have.  The only belongings needed at this time will be a list of your home medications and if applicable your C-PAP/BI-PAP machine.  A Pre-op nurse will start an IV and you may receive medication in preparation for surgery, including something to help you relax.     Please be aware that surgery does come with discomfort.  We want to make every effort to control your discomfort so  please discuss any uncontrolled symptoms with your nurse.   Your doctor will most likely have prescribed pain medications.      If you are going home after surgery you will receive individualized written care instructions before being discharged.  A responsible adult must drive you to and from the hospital on the day of your surgery and stay with you for 24 hours.  Discharge prescriptions can be filled by the hospital pharmacy during regular pharmacy hours.  If you are having surgery late in the day/evening your prescription may be e-prescribed to your pharmacy.  Please verify your pharmacy hours or chose a 24 hour pharmacy to avoid not having access to your prescription because your pharmacy has closed for the day.    If you are staying overnight following surgery, you will be transported to your hospital room following the recovery period.  [facility] has all private rooms.    If you have any questions please call Pre-Admission Testing at [phone].  Deductibles and co-payments are collected on the day of service. Please be prepared to pay the required co-pay, deductible or deposit on the day of service as defined by your plan.    Patient Education for Self-Quarantine Process    • Following your COVID testing, we strongly recommend that you wear a mask when you are with other people and practice social distancing.   • Limit your activities to only required outings.  • Wash your hands with soap and water frequently for at least 20 seconds.   • Avoid touching your eyes, nose and mouth with unwashed hands.  • Do not share anything - utensils, drinking glasses, food from the same bowl.   • Sanitize household surfaces daily. Include all high touch areas (door handles, light switches, phones, countertops, etc.)    Call your surgeon immediately if you experience any of the following symptoms:  • Sore Throat  • Shortness of Breath or difficulty breathing  • Cough  • Chills  • Body soreness or muscle  pain  • Headache  • Fever  • New loss of taste or smell  • Do not arrive for your surgery ill.  Your procedure will need to be rescheduled to another time.  You will need to call your physician before the day of surgery to avoid any unnecessary exposure to hospital staff as well as other patients.

## 2022-01-12 ENCOUNTER — TRANSCRIBE ORDERS (OUTPATIENT)
Dept: ADMINISTRATIVE | Facility: HOSPITAL | Age: 62
End: 2022-01-12

## 2022-01-12 DIAGNOSIS — Z01.818 OTHER SPECIFIED PRE-OPERATIVE EXAMINATION: Primary | ICD-10-CM

## 2022-01-17 ENCOUNTER — APPOINTMENT (OUTPATIENT)
Dept: LAB | Facility: HOSPITAL | Age: 62
End: 2022-01-17

## 2022-01-17 ENCOUNTER — LAB (OUTPATIENT)
Dept: LAB | Facility: HOSPITAL | Age: 62
End: 2022-01-17

## 2022-01-17 DIAGNOSIS — Z01.818 OTHER SPECIFIED PRE-OPERATIVE EXAMINATION: ICD-10-CM

## 2022-01-17 LAB — SARS-COV-2 ORF1AB RESP QL NAA+PROBE: NOT DETECTED

## 2022-01-17 PROCEDURE — C9803 HOPD COVID-19 SPEC COLLECT: HCPCS

## 2022-01-17 PROCEDURE — U0004 COV-19 TEST NON-CDC HGH THRU: HCPCS

## 2022-01-19 ENCOUNTER — ANESTHESIA (OUTPATIENT)
Dept: PERIOP | Facility: HOSPITAL | Age: 62
End: 2022-01-19

## 2022-01-19 ENCOUNTER — APPOINTMENT (OUTPATIENT)
Dept: LAB | Facility: HOSPITAL | Age: 62
End: 2022-01-19

## 2022-01-19 ENCOUNTER — HOSPITAL ENCOUNTER (OUTPATIENT)
Facility: HOSPITAL | Age: 62
Setting detail: HOSPITAL OUTPATIENT SURGERY
Discharge: HOME | End: 2022-01-19
Attending: ORTHOPAEDIC SURGERY | Admitting: ORTHOPAEDIC SURGERY

## 2022-01-19 ENCOUNTER — ANESTHESIA EVENT (OUTPATIENT)
Dept: PERIOP | Facility: HOSPITAL | Age: 62
End: 2022-01-19

## 2022-01-19 VITALS
OXYGEN SATURATION: 95 % | HEART RATE: 68 BPM | TEMPERATURE: 97.6 F | RESPIRATION RATE: 18 BRPM | SYSTOLIC BLOOD PRESSURE: 131 MMHG | DIASTOLIC BLOOD PRESSURE: 81 MMHG

## 2022-01-19 DIAGNOSIS — S83.232A COMPLEX TEAR OF MEDIAL MENISCUS OF LEFT KNEE AS CURRENT INJURY, INITIAL ENCOUNTER: ICD-10-CM

## 2022-01-19 PROCEDURE — 25010000002 FENTANYL CITRATE (PF) 50 MCG/ML SOLUTION: Performed by: NURSE ANESTHETIST, CERTIFIED REGISTERED

## 2022-01-19 PROCEDURE — 25010000002 PROPOFOL 10 MG/ML EMULSION: Performed by: NURSE ANESTHETIST, CERTIFIED REGISTERED

## 2022-01-19 PROCEDURE — 25010000002 METHYLPREDNISOLONE PER 80 MG: Performed by: ORTHOPAEDIC SURGERY

## 2022-01-19 PROCEDURE — 25010000002 DEXAMETHASONE PER 1 MG: Performed by: NURSE ANESTHETIST, CERTIFIED REGISTERED

## 2022-01-19 PROCEDURE — 0 CEFAZOLIN IN DEXTROSE 2-4 GM/100ML-% SOLUTION: Performed by: ORTHOPAEDIC SURGERY

## 2022-01-19 PROCEDURE — 25010000002 MIDAZOLAM PER 1 MG: Performed by: STUDENT IN AN ORGANIZED HEALTH CARE EDUCATION/TRAINING PROGRAM

## 2022-01-19 PROCEDURE — 29881 ARTHRS KNE SRG MNISECTMY M/L: CPT | Performed by: ORTHOPAEDIC SURGERY

## 2022-01-19 PROCEDURE — 25010000002 ONDANSETRON PER 1 MG: Performed by: NURSE ANESTHETIST, CERTIFIED REGISTERED

## 2022-01-19 PROCEDURE — 25010000002 HYDROMORPHONE PER 4 MG: Performed by: NURSE ANESTHETIST, CERTIFIED REGISTERED

## 2022-01-19 RX ORDER — SODIUM CHLORIDE 0.9 % (FLUSH) 0.9 %
3 SYRINGE (ML) INJECTION EVERY 12 HOURS SCHEDULED
Status: DISCONTINUED | OUTPATIENT
Start: 2022-01-19 | End: 2022-01-19 | Stop reason: HOSPADM

## 2022-01-19 RX ORDER — PROMETHAZINE HYDROCHLORIDE 25 MG/1
25 TABLET ORAL ONCE AS NEEDED
Status: DISCONTINUED | OUTPATIENT
Start: 2022-01-19 | End: 2022-01-19 | Stop reason: HOSPADM

## 2022-01-19 RX ORDER — HYDROCODONE BITARTRATE AND ACETAMINOPHEN 5; 325 MG/1; MG/1
1 TABLET ORAL EVERY 6 HOURS PRN
Status: DISCONTINUED | OUTPATIENT
Start: 2022-01-19 | End: 2022-01-19 | Stop reason: HOSPADM

## 2022-01-19 RX ORDER — LIDOCAINE HYDROCHLORIDE 10 MG/ML
0.5 INJECTION, SOLUTION EPIDURAL; INFILTRATION; INTRACAUDAL; PERINEURAL ONCE AS NEEDED
Status: DISCONTINUED | OUTPATIENT
Start: 2022-01-19 | End: 2022-01-19 | Stop reason: HOSPADM

## 2022-01-19 RX ORDER — NALOXONE HCL 0.4 MG/ML
0.2 VIAL (ML) INJECTION AS NEEDED
Status: DISCONTINUED | OUTPATIENT
Start: 2022-01-19 | End: 2022-01-19 | Stop reason: HOSPADM

## 2022-01-19 RX ORDER — IBUPROFEN 600 MG/1
600 TABLET ORAL ONCE AS NEEDED
Status: DISCONTINUED | OUTPATIENT
Start: 2022-01-19 | End: 2022-01-19 | Stop reason: HOSPADM

## 2022-01-19 RX ORDER — HYDRALAZINE HYDROCHLORIDE 20 MG/ML
5 INJECTION INTRAMUSCULAR; INTRAVENOUS
Status: DISCONTINUED | OUTPATIENT
Start: 2022-01-19 | End: 2022-01-19 | Stop reason: HOSPADM

## 2022-01-19 RX ORDER — DEXAMETHASONE SODIUM PHOSPHATE 10 MG/ML
INJECTION INTRAMUSCULAR; INTRAVENOUS AS NEEDED
Status: DISCONTINUED | OUTPATIENT
Start: 2022-01-19 | End: 2022-01-19 | Stop reason: SURG

## 2022-01-19 RX ORDER — CEFAZOLIN SODIUM 2 G/100ML
2 INJECTION, SOLUTION INTRAVENOUS ONCE
Status: COMPLETED | OUTPATIENT
Start: 2022-01-19 | End: 2022-01-19

## 2022-01-19 RX ORDER — DIPHENHYDRAMINE HCL 25 MG
25 CAPSULE ORAL
Status: DISCONTINUED | OUTPATIENT
Start: 2022-01-19 | End: 2022-01-19 | Stop reason: HOSPADM

## 2022-01-19 RX ORDER — PROPOFOL 10 MG/ML
VIAL (ML) INTRAVENOUS AS NEEDED
Status: DISCONTINUED | OUTPATIENT
Start: 2022-01-19 | End: 2022-01-19 | Stop reason: SURG

## 2022-01-19 RX ORDER — ONDANSETRON 2 MG/ML
4 INJECTION INTRAMUSCULAR; INTRAVENOUS ONCE AS NEEDED
Status: DISCONTINUED | OUTPATIENT
Start: 2022-01-19 | End: 2022-01-19 | Stop reason: HOSPADM

## 2022-01-19 RX ORDER — ONDANSETRON 2 MG/ML
INJECTION INTRAMUSCULAR; INTRAVENOUS AS NEEDED
Status: DISCONTINUED | OUTPATIENT
Start: 2022-01-19 | End: 2022-01-19 | Stop reason: SURG

## 2022-01-19 RX ORDER — HYDROCODONE BITARTRATE AND ACETAMINOPHEN 5; 325 MG/1; MG/1
1 TABLET ORAL EVERY 4 HOURS PRN
Qty: 40 TABLET | Refills: 0 | Status: SHIPPED | OUTPATIENT
Start: 2022-01-19 | End: 2022-06-21

## 2022-01-19 RX ORDER — EPHEDRINE SULFATE 50 MG/ML
5 INJECTION, SOLUTION INTRAVENOUS ONCE AS NEEDED
Status: DISCONTINUED | OUTPATIENT
Start: 2022-01-19 | End: 2022-01-19 | Stop reason: HOSPADM

## 2022-01-19 RX ORDER — SODIUM CHLORIDE 0.9 % (FLUSH) 0.9 %
3-10 SYRINGE (ML) INJECTION AS NEEDED
Status: DISCONTINUED | OUTPATIENT
Start: 2022-01-19 | End: 2022-01-19 | Stop reason: HOSPADM

## 2022-01-19 RX ORDER — LABETALOL HYDROCHLORIDE 5 MG/ML
5 INJECTION, SOLUTION INTRAVENOUS
Status: DISCONTINUED | OUTPATIENT
Start: 2022-01-19 | End: 2022-01-19 | Stop reason: HOSPADM

## 2022-01-19 RX ORDER — PROMETHAZINE HYDROCHLORIDE 25 MG/1
25 SUPPOSITORY RECTAL ONCE AS NEEDED
Status: DISCONTINUED | OUTPATIENT
Start: 2022-01-19 | End: 2022-01-19 | Stop reason: HOSPADM

## 2022-01-19 RX ORDER — DIPHENHYDRAMINE HYDROCHLORIDE 50 MG/ML
12.5 INJECTION INTRAMUSCULAR; INTRAVENOUS
Status: DISCONTINUED | OUTPATIENT
Start: 2022-01-19 | End: 2022-01-19 | Stop reason: HOSPADM

## 2022-01-19 RX ORDER — HYDROMORPHONE HYDROCHLORIDE 1 MG/ML
0.5 INJECTION, SOLUTION INTRAMUSCULAR; INTRAVENOUS; SUBCUTANEOUS
Status: DISCONTINUED | OUTPATIENT
Start: 2022-01-19 | End: 2022-01-19 | Stop reason: HOSPADM

## 2022-01-19 RX ORDER — MIDAZOLAM HYDROCHLORIDE 1 MG/ML
1 INJECTION INTRAMUSCULAR; INTRAVENOUS
Status: DISCONTINUED | OUTPATIENT
Start: 2022-01-19 | End: 2022-01-19 | Stop reason: HOSPADM

## 2022-01-19 RX ORDER — ACETAMINOPHEN 500 MG
500 TABLET ORAL ONCE
Status: COMPLETED | OUTPATIENT
Start: 2022-01-19 | End: 2022-01-19

## 2022-01-19 RX ORDER — SODIUM CHLORIDE, SODIUM LACTATE, POTASSIUM CHLORIDE, CALCIUM CHLORIDE 600; 310; 30; 20 MG/100ML; MG/100ML; MG/100ML; MG/100ML
9 INJECTION, SOLUTION INTRAVENOUS CONTINUOUS
Status: DISCONTINUED | OUTPATIENT
Start: 2022-01-19 | End: 2022-01-19 | Stop reason: HOSPADM

## 2022-01-19 RX ORDER — FENTANYL CITRATE 50 UG/ML
INJECTION, SOLUTION INTRAMUSCULAR; INTRAVENOUS AS NEEDED
Status: DISCONTINUED | OUTPATIENT
Start: 2022-01-19 | End: 2022-01-19 | Stop reason: SURG

## 2022-01-19 RX ORDER — FENTANYL CITRATE 50 UG/ML
50 INJECTION, SOLUTION INTRAMUSCULAR; INTRAVENOUS
Status: DISCONTINUED | OUTPATIENT
Start: 2022-01-19 | End: 2022-01-19 | Stop reason: HOSPADM

## 2022-01-19 RX ORDER — FLUMAZENIL 0.1 MG/ML
0.2 INJECTION INTRAVENOUS AS NEEDED
Status: DISCONTINUED | OUTPATIENT
Start: 2022-01-19 | End: 2022-01-19 | Stop reason: HOSPADM

## 2022-01-19 RX ORDER — SULFAMETHOXAZOLE AND TRIMETHOPRIM 800; 160 MG/1; MG/1
1 TABLET ORAL 2 TIMES DAILY
Qty: 10 TABLET | Refills: 0 | Status: SHIPPED | OUTPATIENT
Start: 2022-01-19 | End: 2022-05-13 | Stop reason: ALTCHOICE

## 2022-01-19 RX ORDER — SODIUM CHLORIDE, SODIUM LACTATE, POTASSIUM CHLORIDE, AND CALCIUM CHLORIDE .6; .31; .03; .02 G/100ML; G/100ML; G/100ML; G/100ML
IRRIGANT IRRIGATION AS NEEDED
Status: DISCONTINUED | OUTPATIENT
Start: 2022-01-19 | End: 2022-01-19 | Stop reason: HOSPADM

## 2022-01-19 RX ADMIN — DEXAMETHASONE SODIUM PHOSPHATE 8 MG: 10 INJECTION INTRAMUSCULAR; INTRAVENOUS at 12:08

## 2022-01-19 RX ADMIN — MIDAZOLAM 1 MG: 1 INJECTION INTRAMUSCULAR; INTRAVENOUS at 11:12

## 2022-01-19 RX ADMIN — HYDROMORPHONE HYDROCHLORIDE 0.5 MG: 1 INJECTION, SOLUTION INTRAMUSCULAR; INTRAVENOUS; SUBCUTANEOUS at 13:25

## 2022-01-19 RX ADMIN — HYDROCODONE BITARTRATE AND ACETAMINOPHEN 1 TABLET: 5; 325 TABLET ORAL at 13:50

## 2022-01-19 RX ADMIN — FENTANYL CITRATE 50 MCG: 50 INJECTION INTRAMUSCULAR; INTRAVENOUS at 12:07

## 2022-01-19 RX ADMIN — FENTANYL CITRATE 50 MCG: 50 INJECTION INTRAMUSCULAR; INTRAVENOUS at 13:33

## 2022-01-19 RX ADMIN — FENTANYL CITRATE 25 MCG: 50 INJECTION INTRAMUSCULAR; INTRAVENOUS at 12:25

## 2022-01-19 RX ADMIN — FENTANYL CITRATE 50 MCG: 50 INJECTION INTRAMUSCULAR; INTRAVENOUS at 13:15

## 2022-01-19 RX ADMIN — CEFAZOLIN SODIUM 2 G: 2 INJECTION, SOLUTION INTRAVENOUS at 12:01

## 2022-01-19 RX ADMIN — ONDANSETRON 4 MG: 2 INJECTION INTRAMUSCULAR; INTRAVENOUS at 12:20

## 2022-01-19 RX ADMIN — FENTANYL CITRATE 25 MCG: 50 INJECTION INTRAMUSCULAR; INTRAVENOUS at 12:28

## 2022-01-19 RX ADMIN — ACETAMINOPHEN 500 MG: 500 TABLET, FILM COATED ORAL at 10:59

## 2022-01-19 RX ADMIN — SODIUM CHLORIDE, POTASSIUM CHLORIDE, SODIUM LACTATE AND CALCIUM CHLORIDE 9 ML/HR: 600; 310; 30; 20 INJECTION, SOLUTION INTRAVENOUS at 11:12

## 2022-01-19 RX ADMIN — PROPOFOL 300 MG: 10 INJECTION, EMULSION INTRAVENOUS at 11:58

## 2022-01-19 NOTE — ANESTHESIA PREPROCEDURE EVALUATION
Anesthesia Evaluation     Patient summary reviewed and Nursing notes reviewed   no history of anesthetic complications:  NPO Solid Status: > 8 hours  NPO Liquid Status: > 2 hours           Airway   Mallampati: II  TM distance: >3 FB  Neck ROM: full  Dental      Pulmonary    Cardiovascular     ECG reviewed    (+) hypertension, CAD, cardiac stents dysrhythmias Atrial Fib, hyperlipidemia,       Neuro/Psych  GI/Hepatic/Renal/Endo    (+) obesity,   thyroid problem hypothyroidism    Musculoskeletal     Abdominal    Substance History      OB/GYN          Other                        Anesthesia Plan    ASA 3     general     intravenous induction     Anesthetic plan, all risks, benefits, and alternatives have been provided, discussed and informed consent has been obtained with: patient.        CODE STATUS:

## 2022-01-19 NOTE — ANESTHESIA POSTPROCEDURE EVALUATION
Patient: SIMON Gomez Jr.    Procedure Summary     Date: 01/19/22 Room / Location:  [facility]  /  [facility]    Anesthesia Start: 1152 Anesthesia Stop: 1251    Procedure: KNEE ARTHROSCOPY, PARTIAL MEDIAL MENISECTOMY, DEBRIDEMENT OF ARTHRITIS, REMOVAL OF LOSE BODIES (Left Knee) Diagnosis:       Complex tear of medial meniscus of left knee as current injury, initial encounter      (Complex tear of medial meniscus of left knee as current injury, initial encounter [S83.232A])    Surgeons: Destiney Tucker MD Provider: Marin Olmstead MD    Anesthesia Type: general ASA Status: 3          Anesthesia Type: general    Vitals  Vitals Value Taken Time   /93 01/19/22 1356   Temp 36.4 °C (97.6 °F) 01/19/22 1345   Pulse 64 01/19/22 1359   Resp 15 01/19/22 1345   SpO2 93 % 01/19/22 1359   Vitals shown include unvalidated device data.        Post Anesthesia Care and Evaluation    Patient location during evaluation: bedside  Patient participation: complete - patient participated  Level of consciousness: awake and alert  Pain management: adequate  Airway patency: patent  Anesthetic complications: No anesthetic complications    Cardiovascular status: acceptable  Respiratory status: acceptable  Hydration status: acceptable    Comments: /81 (BP Location: Right arm, Patient Position: Lying)   Pulse 68   Temp 36.4 °C (97.6 °F) (Temporal)   Resp 18   SpO2 95%

## 2022-01-19 NOTE — ANESTHESIA PROCEDURE NOTES
Airway  Urgency: elective    Date/Time: 1/19/2022 12:00 PM    General Information and Staff    Patient location during procedure: OR  Anesthesiologist: Aysha, Marin Thakkar MD  CRNA: Robyn Hernandez CRNA    Indications and Patient Condition    Preoxygenated: yes  Mask difficulty assessment: 0 - not attempted    Final Airway Details  Final airway type: supraglottic airway      Successful airway: unique  Size 5    Number of attempts at approach: 1  Assessment: lips, teeth, and gum same as pre-op and atraumatic intubation    Additional Comments  Atraumatic, adeq seal, secured, MV/AV until SV

## 2022-01-20 ENCOUNTER — TELEPHONE (OUTPATIENT)
Dept: ORTHOPEDIC SURGERY | Facility: CLINIC | Age: 62
End: 2022-01-20

## 2022-01-20 NOTE — TELEPHONE ENCOUNTER
----- Message from SIMON Gomez Jr. sent at 1/20/2022 10:09 AM EST -----  Regarding: Blood  Is it normal for blood to bleed though bandage on knee. And patch stay wet?  Look at picture and Let me know if there is anything I should do or just don’t worry. Pain is minimal range of knee good.

## 2022-01-20 NOTE — TELEPHONE ENCOUNTER
Spoke to patient, relayed per Dr. Tucker that he can change the dressing, apply new pressure dressing to the site and continue to ice. Patient voiced understanding

## 2022-01-28 ENCOUNTER — OFFICE VISIT (OUTPATIENT)
Dept: ORTHOPEDIC SURGERY | Facility: CLINIC | Age: 62
End: 2022-01-28

## 2022-01-28 VITALS — WEIGHT: 250 LBS | HEIGHT: 74 IN | RESPIRATION RATE: 18 BRPM | BODY MASS INDEX: 32.08 KG/M2 | TEMPERATURE: 98 F

## 2022-01-28 DIAGNOSIS — Z98.890 S/P ARTHROSCOPY OF KNEE: Primary | ICD-10-CM

## 2022-01-28 PROCEDURE — 99024 POSTOP FOLLOW-UP VISIT: CPT | Performed by: ORTHOPAEDIC SURGERY

## 2022-01-28 NOTE — PROGRESS NOTES
Knee Scope follow Up 1st Visit      Patient: SIMON Gomez Jr.        YOB: 1960      Chief Complaints: knee pain      History of Present Illness: Pt is here f/u knee arthroscopy        Allergies:   Allergies   Allergen Reactions   • Meperidine Anaphylaxis     Per patient's wife, he stopped breathing   • Hydrocodone-Acetaminophen Hallucinations   • Oxycodone-Acetaminophen Hallucinations       Medications:   Home Medications:  Current Outpatient Medications on File Prior to Visit   Medication Sig   • aspirin 81 MG chewable tablet Chew 1 tablet Daily. (Patient taking differently: Chew 81 mg Daily. TO CONTINUE PER CARDIOLOGY)   • atorvastatin (LIPITOR) 80 MG tablet TAKE 1 TABLET BY MOUTH ONCE DAILY AT BEDTIME (Patient taking differently: Take 80 mg by mouth Every Night.)   • Cholecalciferol (VITAMIN D PO) Take 10,000 Units by mouth Every Night.   • clopidogrel (PLAVIX) 75 MG tablet Take 1 tablet by mouth once daily (Patient taking differently: Take 75 mg by mouth Daily. HELD FOR 1 WEEK PRIOR TO OR PER CARDIO)   • Cyanocobalamin (VITAMIN B 12 PO) Take 1,000 mcg by mouth Every Night.   • Diclofenac Sodium (VOLTAREN) 1 % gel gel Apply 4 g topically to the appropriate area as directed 4 (Four) Times a Day As Needed (knee pain and swelling). (Patient taking differently: Apply 4 g topically to the appropriate area as directed 4 (Four) Times a Day As Needed (knee pain and swelling). HELD FOR OR)   • levothyroxine (Euthyrox) 150 MCG tablet Take 1 tablet by mouth Daily.   • losartan (COZAAR) 50 MG tablet Take 1 tablet by mouth once daily (Patient taking differently: Take 50 mg by mouth Every Night.)   • metoprolol succinate XL (TOPROL-XL) 25 MG 24 hr tablet Take 1/2 (one-half) tablet by mouth once daily (Patient taking differently: Take 12.5 mg by mouth Every Night.)   • tadalafil (Cialis) 20 MG tablet Take 1 tablet by mouth Daily As Needed for Erectile Dysfunction. (Patient taking differently: Take 20 mg by  "mouth Daily As Needed for Erectile Dysfunction. TO HOLD X 48 HOURS)   • vitamin C (ASCORBIC ACID) 250 MG tablet Take 250 mg by mouth Every Night.   • HYDROcodone-acetaminophen (NORCO) 5-325 MG per tablet Take 1 tablet by mouth Every 4 (Four) Hours As Needed for Severe Pain .   • sulfamethoxazole-trimethoprim (Bactrim DS) 800-160 MG per tablet Take 1 tablet by mouth 2 (Two) Times a Day.     No current facility-administered medications on file prior to visit.     Current Medications:  Scheduled Meds:  Continuous Infusions:No current facility-administered medications for this visit.    PRN Meds:.          Physical Exam: 61 y.o. male  General Appearance:    Alert, cooperative, in no acute distress                   Vitals:    01/28/22 1049   Resp: 18   Temp: 98 °F (36.7 °C)   Weight: 113 kg (250 lb)   Height: 188 cm (74\")      Patient is alert and oriented ×3 no acute distress normal mood physical exam.  Physical exam of the knee, incisions looked good there is no erythema, calf is soft and non-tender.  No sign or sx of DVT      Assessment  S/P knee scope.  I did review intraoperative findings and arthroscopic pictures with the patient.          Plan: To remove sutures today place Steri-Strips and start into  physical therapy and I will have thrm follow up in 4 weeks.                "

## 2022-01-28 NOTE — PROGRESS NOTES
Knee Scope follow Up 1st Visit      Patient: SIMON Gomez Jr.        YOB: 1960      Chief Complaints: knee pain left      History of Present Illness: Pt is here f/u knee arthroscopy of the left knee he states he feels so good he can go up and down stairs and has since day one.  I asked him if he uses crutches he states no he used a cane        Allergies:   Allergies   Allergen Reactions   • Meperidine Anaphylaxis     Per patient's wife, he stopped breathing   • Hydrocodone-Acetaminophen Hallucinations   • Oxycodone-Acetaminophen Hallucinations       Medications:   Home Medications:  Current Outpatient Medications on File Prior to Visit   Medication Sig   • aspirin 81 MG chewable tablet Chew 1 tablet Daily. (Patient taking differently: Chew 81 mg Daily. TO CONTINUE PER CARDIOLOGY)   • atorvastatin (LIPITOR) 80 MG tablet TAKE 1 TABLET BY MOUTH ONCE DAILY AT BEDTIME (Patient taking differently: Take 80 mg by mouth Every Night.)   • Cholecalciferol (VITAMIN D PO) Take 10,000 Units by mouth Every Night.   • clopidogrel (PLAVIX) 75 MG tablet Take 1 tablet by mouth once daily (Patient taking differently: Take 75 mg by mouth Daily. HELD FOR 1 WEEK PRIOR TO OR PER CARDIO)   • Cyanocobalamin (VITAMIN B 12 PO) Take 1,000 mcg by mouth Every Night.   • Diclofenac Sodium (VOLTAREN) 1 % gel gel Apply 4 g topically to the appropriate area as directed 4 (Four) Times a Day As Needed (knee pain and swelling). (Patient taking differently: Apply 4 g topically to the appropriate area as directed 4 (Four) Times a Day As Needed (knee pain and swelling). HELD FOR OR)   • levothyroxine (Euthyrox) 150 MCG tablet Take 1 tablet by mouth Daily.   • losartan (COZAAR) 50 MG tablet Take 1 tablet by mouth once daily (Patient taking differently: Take 50 mg by mouth Every Night.)   • metoprolol succinate XL (TOPROL-XL) 25 MG 24 hr tablet Take 1/2 (one-half) tablet by mouth once daily (Patient taking differently: Take 12.5 mg by mouth  "Every Night.)   • tadalafil (Cialis) 20 MG tablet Take 1 tablet by mouth Daily As Needed for Erectile Dysfunction. (Patient taking differently: Take 20 mg by mouth Daily As Needed for Erectile Dysfunction. TO HOLD X 48 HOURS)   • vitamin C (ASCORBIC ACID) 250 MG tablet Take 250 mg by mouth Every Night.   • HYDROcodone-acetaminophen (NORCO) 5-325 MG per tablet Take 1 tablet by mouth Every 4 (Four) Hours As Needed for Severe Pain .   • sulfamethoxazole-trimethoprim (Bactrim DS) 800-160 MG per tablet Take 1 tablet by mouth 2 (Two) Times a Day.     No current facility-administered medications on file prior to visit.     Current Medications:  Scheduled Meds:  Continuous Infusions:No current facility-administered medications for this visit.    PRN Meds:.          Physical Exam: 61 y.o. male  General Appearance:    Alert, cooperative, in no acute distress                   Vitals:    01/28/22 1049   Resp: 18   Temp: 98 °F (36.7 °C)   Weight: 113 kg (250 lb)   Height: 188 cm (74\")      Patient is alert and oriented ×3 no acute distress normal mood physical exam.  Physical exam of the knee, incisions looked good there is no erythema, calf is soft and non-tender.  No sign or sx of DVT      Assessment  S/P knee scope.  I did review intraoperative findings and arthroscopic pictures with the patient.          Plan: To remove sutures today place Steri-Strips and start into  physical therapy and I will have thrm follow up in 4 weeks.                "

## 2022-02-24 NOTE — PROGRESS NOTES
Knee Scope follow Up       Patient: SIMON Gomez Jr.        YOB: 1960      Chief Complaints: knee pain left      History of Present Illness: Pt is here f/u knee arthroscopy left knee he states he doing great knee feels great he is anxious to return to work        Allergies:   Allergies   Allergen Reactions   • Meperidine Anaphylaxis     Per patient's wife, he stopped breathing   • Hydrocodone-Acetaminophen Hallucinations   • Oxycodone-Acetaminophen Hallucinations       Medications:   Home Medications:  Current Outpatient Medications on File Prior to Visit   Medication Sig   • aspirin 81 MG chewable tablet Chew 1 tablet Daily. (Patient taking differently: Chew 81 mg Daily. TO CONTINUE PER CARDIOLOGY)   • atorvastatin (LIPITOR) 80 MG tablet TAKE 1 TABLET BY MOUTH ONCE DAILY AT BEDTIME (Patient taking differently: Take 80 mg by mouth Every Night.)   • Cholecalciferol (VITAMIN D PO) Take 10,000 Units by mouth Every Night.   • clopidogrel (PLAVIX) 75 MG tablet Take 1 tablet by mouth once daily (Patient taking differently: Take 75 mg by mouth Daily. HELD FOR 1 WEEK PRIOR TO OR PER CARDIO)   • Cyanocobalamin (VITAMIN B 12 PO) Take 1,000 mcg by mouth Every Night.   • Diclofenac Sodium (VOLTAREN) 1 % gel gel Apply 4 g topically to the appropriate area as directed 4 (Four) Times a Day As Needed (knee pain and swelling). (Patient taking differently: Apply 4 g topically to the appropriate area as directed 4 (Four) Times a Day As Needed (knee pain and swelling). HELD FOR OR)   • HYDROcodone-acetaminophen (NORCO) 5-325 MG per tablet Take 1 tablet by mouth Every 4 (Four) Hours As Needed for Severe Pain .   • levothyroxine (Euthyrox) 150 MCG tablet Take 1 tablet by mouth Daily.   • losartan (COZAAR) 50 MG tablet Take 1 tablet by mouth once daily (Patient taking differently: Take 50 mg by mouth Every Night.)   • metoprolol succinate XL (TOPROL-XL) 25 MG 24 hr tablet Take 1/2 (one-half) tablet by mouth once daily  (Patient taking differently: Take 12.5 mg by mouth Every Night.)   • sulfamethoxazole-trimethoprim (Bactrim DS) 800-160 MG per tablet Take 1 tablet by mouth 2 (Two) Times a Day.   • tadalafil (Cialis) 20 MG tablet Take 1 tablet by mouth Daily As Needed for Erectile Dysfunction. (Patient taking differently: Take 20 mg by mouth Daily As Needed for Erectile Dysfunction. TO HOLD X 48 HOURS)   • vitamin C (ASCORBIC ACID) 250 MG tablet Take 250 mg by mouth Every Night.     No current facility-administered medications on file prior to visit.     Current Medications:  Scheduled Meds:  Continuous Infusions:No current facility-administered medications for this visit.    PRN Meds:.          Physical Exam: 61 y.o. male  General Appearance:    Alert, cooperative, in no acute distress                 There were no vitals filed for this visit.   Patient is alert and oriented ×3 no acute distress normal mood physical exam.  Physical exam of the knee, incisions looked good there is no erythema, calf is soft and non-tender.  No sign or sx of DVT      Assessment  S/P knee scope.  Overall doing well.         Plan: Continue with strengthening, progression of activities I will allow him to return to work without restrictions as long as he is doing well he can follow-up as needed

## 2022-02-25 ENCOUNTER — OFFICE VISIT (OUTPATIENT)
Dept: ORTHOPEDIC SURGERY | Facility: CLINIC | Age: 62
End: 2022-02-25

## 2022-02-25 VITALS — WEIGHT: 242.1 LBS | HEIGHT: 74 IN | BODY MASS INDEX: 31.07 KG/M2 | TEMPERATURE: 96.9 F

## 2022-02-25 DIAGNOSIS — Z98.890 S/P ARTHROSCOPY OF KNEE: Primary | ICD-10-CM

## 2022-02-25 PROCEDURE — 99024 POSTOP FOLLOW-UP VISIT: CPT | Performed by: ORTHOPAEDIC SURGERY

## 2022-03-10 RX ORDER — LEVOTHYROXINE SODIUM 150 UG/1
TABLET ORAL
Qty: 90 TABLET | Refills: 0 | OUTPATIENT
Start: 2022-03-10

## 2022-03-14 ENCOUNTER — PATIENT MESSAGE (OUTPATIENT)
Dept: FAMILY MEDICINE CLINIC | Facility: CLINIC | Age: 62
End: 2022-03-14

## 2022-03-14 RX ORDER — LEVOTHYROXINE SODIUM 0.15 MG/1
150 TABLET ORAL DAILY
Qty: 90 TABLET | Refills: 3 | Status: SHIPPED | OUTPATIENT
Start: 2022-03-14 | End: 2022-06-01

## 2022-03-14 NOTE — TELEPHONE ENCOUNTER
Rx Refill Note  Requested Prescriptions      No prescriptions requested or ordered in this encounter      Last office visit with prescribing clinician: 12/14/2021      Next office visit with prescribing clinician: 6/21/2022            Hue Manzo LPN  03/14/22, 07:48 EDT

## 2022-03-24 ENCOUNTER — TELEPHONE (OUTPATIENT)
Dept: ORTHOPEDIC SURGERY | Facility: CLINIC | Age: 62
End: 2022-03-24

## 2022-03-24 NOTE — TELEPHONE ENCOUNTER
Relationship: WORK COMP       Best call back number: [phone]    What form or medical record are you requesting: IMPAIRMENT RATING     Who is requesting this form or medical record from you: WORK COMP SENT PAPERWORK ON 03/01/22 ASKING TO FAX OVER COMPLETED RATING     How would you like to receive the form or medical records (pick-up, mail, fax): FAX   If fax, what is the fax number: [phone]  ATTN CLAIM # [id number]

## 2022-03-30 ENCOUNTER — TELEPHONE (OUTPATIENT)
Dept: ORTHOPEDIC SURGERY | Facility: CLINIC | Age: 62
End: 2022-03-30

## 2022-05-13 ENCOUNTER — OFFICE VISIT (OUTPATIENT)
Dept: CARDIOLOGY | Facility: CLINIC | Age: 62
End: 2022-05-13

## 2022-05-13 VITALS
HEIGHT: 74 IN | BODY MASS INDEX: 32.6 KG/M2 | WEIGHT: 254 LBS | DIASTOLIC BLOOD PRESSURE: 88 MMHG | HEART RATE: 98 BPM | SYSTOLIC BLOOD PRESSURE: 148 MMHG

## 2022-05-13 DIAGNOSIS — E29.1 TESTOSTERONE DEFICIENCY IN MALE: ICD-10-CM

## 2022-05-13 DIAGNOSIS — I25.10 CORONARY ARTERY DISEASE INVOLVING NATIVE CORONARY ARTERY OF NATIVE HEART WITHOUT ANGINA PECTORIS: Primary | ICD-10-CM

## 2022-05-13 PROCEDURE — 99214 OFFICE O/P EST MOD 30 MIN: CPT | Performed by: INTERNAL MEDICINE

## 2022-05-13 NOTE — PROGRESS NOTES
"    Subjective:     Encounter Date: 05/13/22        Patient ID: SIMON Gomez Jr. is a 62 y.o. male.    Chief Complaint: CAD  This is a 62 year old male with history of CAD, HTN, HLD, & hypothyroidism who came into the hospital in December 2018 for chest pain. EKG ruled him in for STEMI and he underwent cardiac catheterization which found occluded circumflex. He received a 3x15mm Xience Cindy REMA to the proximal circumflex . He also had severe mid LAD stenosis, which was intervened on in early 2019 with 3 overlapping drug eluting stents to the proximal-mid LAD.  LV function is preserved.     He is doing well.  No angina.  Continues to exercise but is having some trouble losing weight.  Is curious about getting on testosterone replacement therapy.  Blood pressure is mostly in the 130s over 80s at home.   Works in EMS.  Loves to ride motorcycles.    REVIEW OF SYSTEMS:   All systems reviewed.  Pertinent positives identified in HPI.  All other systems are negative.    The following portions of the patient's history were reviewed and updated as appropriate: allergies, current medications, past family history, past medical history, past social history, past surgical history and problem list.    Past Medical History:   Diagnosis Date   • A-fib ([facility])    • Abnormal heart rhythm     HISTORY OF OF (PT STATES EP STUDY BY DR BENNETT YEARS AGO), FOLLOWED BY DR GALEANO CURRENTLY   • Anesthesia complication     PT STATES BECAME \"HYPOXIC UNDER GAS\" YEARS AGO    • Colorblind    • Coronary artery disease involving native coronary artery of native heart with angina pectoris ([facility])    • Ex-smoker    • History of COVID-19 01/2021    BILAT PNEUMONIA    • History of GI bleed    • Hyperlipidemia LDL goal <70 1/2/2019   • Hypertension    • Hypothyroidism    • ST elevation myocardial infarction (STEMI) ([facility]) 12/26/2018   • Tear of meniscus of knee    • Testosterone deficiency        Family History   Problem Relation Age of Onset   • Arthritis " Mother    • No Known Problems Father    • No Known Problems Brother    • No Known Problems Daughter    • No Known Problems Brother    • Malig Hyperthermia Neg Hx        Social History     Socioeconomic History   • Marital status:    Tobacco Use   • Smoking status: Former Smoker     Packs/day: 0.00     Years: 0.00     Pack years: 0.00     Types: Cigars     Quit date:      Years since quittin.3   • Smokeless tobacco: Never Used   • Tobacco comment: SOCIAL CIGAR SMOKING ONLY QUIT    Vaping Use   • Vaping Use: Never used   Substance and Sexual Activity   • Alcohol use: Yes     Alcohol/week: 0.0 standard drinks     Comment: RARE   • Drug use: No   • Sexual activity: Defer     Partners: Female       Allergies   Allergen Reactions   • Meperidine Anaphylaxis     Per patient's wife, he stopped breathing   • Hydrocodone-Acetaminophen Hallucinations   • Oxycodone-Acetaminophen Hallucinations       Past Surgical History:   Procedure Laterality Date   • ADENOIDECTOMY     • ANGIOPLASTY      STENT PLACEMENT X 5 (,)    • BACK SURGERY      L5-S1   • CARDIAC CATHETERIZATION N/A 2018    Procedure: Left Heart Cath;  Surgeon: Анна Booker MD;  Location[facility] C[facility];  Service: Cardiovascular   • CARDIAC CATHETERIZATION N/A 2018    Procedure: Coronary angiography;  Surgeon: Анна Booker MD;  Location:  [facility] CATH INVASIVE LOCATION;  Service: Cardiovascular   • CARDIAC CATHETERIZATION N/A 2018    Procedure: Stent REMA coronary;  Surgeon: Анна Booker MD;  Location:  [facility] CATH INVASIVE LOCATION;  Service: Cardiovascular   • CARDIAC CATHETERIZATION  2018    Procedure: Percutaneous Mechanical Thrombectomy;  Surgeon: Анна Booker MD;  Location: [facility] CATH [facility]VASIVE LOC[facility];  Service: Cardiovascular   • CARDIAC CATHETERIZATION N/A 2019    Procedure: Coronary angiography;  Surgeon: Анна Booker MD;  Location: [facility] CATH INVASIVE LOCATION;  Service: Cardiology    • CARDIAC CATHETERIZATION N/A 2/13/2019    Procedure: Stent REMA coronary;  Surgeon: Анна Booker MD;  Location:  [facility] CATH INVASIVE LOCATION;  Service: Cardiology   • CARDIAC CATHETERIZATION N/A 2/13/2019    Procedure: Left Heart Cath;  Surgeon: Анна Booker MD;  Location:  [facility] CATH INVASIVE LOCATION;  Service: Cardiology   • CARDIAC CATHETERIZATION N/A 2/13/2019    Procedure: Left ventriculography;  Surgeon: Анна Booker MD;  Location:  [facility] CATH INVASIVE LOCATION;  Service: Cardiology   • CARDIAC ELECTROPHYSIOLOGY STUDY AND ABLATION     • COLONOSCOPY     • COLONOSCOPY N/A 6/26/2017    Procedure: COLONOSCOPY AT BEDSIDE;  Surgeon: Fadi Gallagher MD;  [facility];  Service:    • EXTERNAL EAR SURGERY     • HAND SURGERY Left    • INGUINAL HERNIA REPAIR      X3   • KNEE ARTHROSCOPY Left 1/19/2022    Procedure: KNEE ARTHROSCOPY, PARTIAL MEDIAL MENISECTOMY, DEBRIDEMENT OF ARTHRITIS, REMOVAL OF LOSE BODIES;  Surgeon: Destiney Tucker MD;  Location:  [facility];  Service: Orthopedics;  Laterality: Left;   • MANDIBLE SURGERY      WIRED X 2  - HIGH SCHOOL   • NOSE SURGERY     • VA RT/LT HEART CATHETERS N/A 12/26/2018    Procedure: Percutaneous Coronary Intervention;  Surgeon: Анна Booker MD;  [facility];  Service: Cardiovascular   • ROTATOR CUFF REPAIR Right    • SHOULDER ARTHROSCOPY Right    • SHOULDER ARTHROSCOPY Left    • THYROIDECTOMY     • TONSILLECTOMY             Procedures       Objective:     Physical Exam     GEN: no distress, alert and oriented, obese  Lungs CTAB, no rales or wheezes  Chest: no abnormalities  Heart: RRR, no murmurs  Abdo: soft,  Nontender, nondistended  Extr: no edema, +2 DP and 2+ carotid pulses b/l  Skin: no rash or bruising  Psych: organized thought, normal behavior and affect        Assessment:          Diagnosis Plan   1. Coronary artery disease involving native coronary artery of native heart without angina pectoris             Plan:       1. CAD:   Lateral STEMI December 2018 with REMA to proximal circumflex, followed by staged proximal LAD intervention using 3 overlapping REMA   No angina at this time with great exercise tolerance.  Needs to add cardiovascular exercise 4 times a week for 30 minutes.  Continue ASA/Plavix indefinitely due to long length of stents in the LAD  2. HTN:   Metoprolol 12.5 and Losartan 50.  Blood pressure is well controlled at home  3. HLD:   Atorva 80  4. Hypothyroidism  5.  Low testosterone: The patient was overtreated with injectable testosterone prior to his MI.  He has been off of it now for a few years and is hoping to get back on it with closer supervision.  I have referred him to his urologist to discuss further    I will see him again in 6 months. Dr. Perez, thank you very much for referring this kind patient to me. Please call with any questions or concerns.        Анна Booker MD  05/13/22    Outpatient Encounter Medications as of 5/13/2022   Medication Sig Dispense Refill   • aspirin 81 MG chewable tablet Chew 1 tablet Daily. (Patient taking differently: Chew 81 mg Daily. TO CONTINUE PER CARDIOLOGY)     • atorvastatin (LIPITOR) 80 MG tablet TAKE 1 TABLET BY MOUTH ONCE DAILY AT BEDTIME (Patient taking differently: Take 80 mg by mouth Every Night.) 90 tablet 3   • Cholecalciferol (VITAMIN D PO) Take 10,000 Units by mouth Every Night.     • clopidogrel (PLAVIX) 75 MG tablet Take 1 tablet by mouth once daily (Patient taking differently: Take 75 mg by mouth Daily. HELD FOR 1 WEEK PRIOR TO OR PER CARDIO) 90 tablet 3   • Cyanocobalamin (VITAMIN B 12 PO) Take 1,000 mcg by mouth Every Night.     • levothyroxine (Euthyrox) 150 MCG tablet Take 1 tablet by mouth Daily. 90 tablet 3   • losartan (COZAAR) 50 MG tablet Take 1 tablet by mouth once daily (Patient taking differently: Take 50 mg by mouth Every Night.) 90 tablet 3   • metoprolol succinate XL (TOPROL-XL) 25 MG 24 hr tablet Take 1/2 (one-half) tablet by  mouth once daily (Patient taking differently: Take 12.5 mg by mouth Every Night.) 45 tablet 2   • vitamin C (ASCORBIC ACID) 250 MG tablet Take 250 mg by mouth Every Night.     • Diclofenac Sodium (VOLTAREN) 1 % gel gel Apply 4 g topically to the appropriate area as directed 4 (Four) Times a Day As Needed (knee pain and swelling). (Patient taking differently: Apply 4 g topically to the appropriate area as directed 4 (Four) Times a Day As Needed (knee pain and swelling). HELD FOR OR) 50 g 1   • HYDROcodone-acetaminophen (NORCO) 5-325 MG per tablet Take 1 tablet by mouth Every 4 (Four) Hours As Needed for Severe Pain . 40 tablet 0   • tadalafil (Cialis) 20 MG tablet Take 1 tablet by mouth Daily As Needed for Erectile Dysfunction. (Patient taking differently: Take 20 mg by mouth Daily As Needed for Erectile Dysfunction. TO HOLD X 48 HOURS) 30 tablet 0   • [DISCONTINUED] sulfamethoxazole-trimethoprim (Bactrim DS) 800-160 MG per tablet Take 1 tablet by mouth 2 (Two) Times a Day. 10 tablet 0     No facility-administered encounter medications on file as of 5/13/2022.

## 2022-05-24 RX ORDER — LOSARTAN POTASSIUM 50 MG/1
50 TABLET ORAL NIGHTLY
Qty: 90 TABLET | Refills: 3 | Status: SHIPPED | OUTPATIENT
Start: 2022-05-24

## 2022-05-24 RX ORDER — METOPROLOL SUCCINATE 25 MG/1
12.5 TABLET, EXTENDED RELEASE ORAL NIGHTLY
Qty: 45 TABLET | Refills: 3 | Status: SHIPPED | OUTPATIENT
Start: 2022-05-24

## 2022-05-27 RX ORDER — FLUOROURACIL 50 MG/G
1 CREAM TOPICAL 2 TIMES DAILY
COMMUNITY
End: 2022-06-21

## 2022-06-01 RX ORDER — LEVOTHYROXINE SODIUM 150 UG/1
TABLET ORAL
Qty: 90 TABLET | Refills: 0 | Status: SHIPPED | OUTPATIENT
Start: 2022-06-01 | End: 2023-02-28 | Stop reason: SDUPTHER

## 2022-06-21 ENCOUNTER — OFFICE VISIT (OUTPATIENT)
Dept: FAMILY MEDICINE CLINIC | Facility: CLINIC | Age: 62
End: 2022-06-21

## 2022-06-21 VITALS
TEMPERATURE: 96.6 F | DIASTOLIC BLOOD PRESSURE: 82 MMHG | HEART RATE: 74 BPM | HEIGHT: 74 IN | SYSTOLIC BLOOD PRESSURE: 140 MMHG | OXYGEN SATURATION: 96 % | BODY MASS INDEX: 33.36 KG/M2 | RESPIRATION RATE: 18 BRPM | WEIGHT: 259.9 LBS

## 2022-06-21 DIAGNOSIS — M25.472 ANKLE EDEMA, BILATERAL: ICD-10-CM

## 2022-06-21 DIAGNOSIS — M25.471 ANKLE EDEMA, BILATERAL: ICD-10-CM

## 2022-06-21 DIAGNOSIS — Z86.39 HISTORY OF HYPOGONADISM: ICD-10-CM

## 2022-06-21 DIAGNOSIS — E78.5 HYPERLIPIDEMIA LDL GOAL <70: ICD-10-CM

## 2022-06-21 DIAGNOSIS — I10 PRIMARY HYPERTENSION: ICD-10-CM

## 2022-06-21 DIAGNOSIS — E89.0 POSTOPERATIVE HYPOTHYROIDISM: Primary | ICD-10-CM

## 2022-06-21 DIAGNOSIS — H61.23 BILATERAL IMPACTED CERUMEN: ICD-10-CM

## 2022-06-21 PROCEDURE — 99214 OFFICE O/P EST MOD 30 MIN: CPT | Performed by: FAMILY MEDICINE

## 2022-06-21 NOTE — PROGRESS NOTES
Chief Complaint  Hypothyroidism (6 MFU ), Joint Swelling (Bilateral ankles, resolves by am after elevation ), and Tinnitus (Left only right now,states was in the right before but not now)    Subjective        SIMON Gomez Jr. presents to [facility]  History of Present Illness  62-year-old male with history of hypothyroidism, hypertension, hyperlipidemia, obesity, STEMI status post stent placement and history of adverse effects from testosterone therapy.  He has history of low T and was treated at 26 again to supratherapeutic levels and that is when he had his STEMI.  Recently seen by his cardiologist who referred him to urology to be reevaluated for low level testosterone replacement given the benefits he had while on the treatment.  Note is in chart and given the patient's last 2 levels were well above 300, testosterone replacement was not thought appropriate.  Patient would like to keep an eye on his testosterone levels.    He is here to follow-up on his thyroid and blood work.  He is complaining today of joint swelling in his ankles that improves with raising his legs. Going on for about 5-6 weeks.   Usually could do whatever and never have.  Goes away at night  Wife notices it.  He does not have pain.   Not great at drinking water. He has caffeine.   Not bad now. Says right ankle is worse than left.   Had surgery on the right leg.  Says he feels his heart is good.   Schedule hasn't really allowed for him to get to the gym. Working a lot more hours. Says he went out of the country for two weeks. Was hiking.  Swelling no matter where he is and what he is doing.     He is also complaining of tinnitus that was bilateral but now persists only on the left side. Said used OTC drops in the right ear and the ringing went away. On the left he still has a ringing.   He wears ear plugs when he rides his motorcycle. Has done since he started riding. Wears full coverage helmet.   He wears  "hearing protection for the gun range.     Blood pressure is elevated today.  Patient has been gaining weight this year.  He had three cups of coffee prior to coming in.   This is pretty typical and then will usually have about 2 more.     Objective   Vital Signs:  /82 (BP Location: Left arm, Patient Position: Sitting, Cuff Size: Adult)   Pulse 74   Temp 96.6 °F (35.9 °C) (Infrared)   Resp 18   Ht 188 cm (74\")   Wt 118 kg (259 lb 14.4 oz)   SpO2 96%   BMI 33.37 kg/m²   Estimated body mass index is 33.37 kg/m² as calculated from the following:    Height as of this encounter: 188 cm (74\").    Weight as of this encounter: 118 kg (259 lb 14.4 oz).          Physical Exam  Vitals reviewed.   Constitutional:       General: He is not in acute distress.     Appearance: Normal appearance. He is obese.   HENT:      Right Ear: Ear canal normal. There is impacted cerumen.      Left Ear: Ear canal normal. There is impacted cerumen.   Eyes:      General: No scleral icterus.        Right eye: No discharge.         Left eye: No discharge.      Conjunctiva/sclera: Conjunctivae normal.   Cardiovascular:      Rate and Rhythm: Normal rate and regular rhythm.      Heart sounds: Normal heart sounds. No murmur heard.  Pulmonary:      Effort: Pulmonary effort is normal. No respiratory distress.      Breath sounds: Normal breath sounds. No wheezing.   Musculoskeletal:         General: No swelling.      Cervical back: Neck supple. No muscular tenderness.      Right lower leg: No edema.      Left lower leg: No edema.   Lymphadenopathy:      Cervical: No cervical adenopathy.   Neurological:      Mental Status: He is oriented to person, place, and time.      Motor: No abnormal muscle tone.   Psychiatric:         Behavior: Behavior normal.        Result Review :                Assessment and Plan   Diagnoses and all orders for this visit:    1. Postoperative hypothyroidism (Primary)  -     TSH  -     T4  -     T4, Free    2. Primary " hypertension  -     Comprehensive Metabolic Panel  -     CBC & Differential    3. Bilateral impacted cerumen    4. Ankle edema, bilateral    5. History of hypogonadism  -     Testosterone    6. Hyperlipidemia LDL goal <70  -     Lipid Panel             Follow Up   No follow-ups on file.  Patient was given instructions and counseling regarding his condition or for health maintenance advice. Please see specific information pulled into the AVS if appropriate.     Repeat BP  Elevated today 148/88, was about the same with cardiology last month, says controlled at home and runs in the 120s/80s.  He does measure his blood pressure routinely with a cuff recommended by his cardiologist.  His repeat was a little better.   With further elevated blood pressure would recommend increasing the losartan. With getting to the gym and less caffeine this should improve.    Due for lab work today we will check in on his thyroid and his testosterone.    For the ringing in the ears it could be related to the impaction and/or hearing loss.  Going to have him do some drops with oil and flushing.  If he still having trouble he can come in for nurse visit and have the ears cleaned out after he has done some oil drops for at least a few times.    Has bilateral ankle edema seems most likely to be related to his lack of water intake and weight gain.  Dependent edema.  Encouraged him to use compression socks like he does when he rides his motorcycle and never gets ankle edema.  This is very effective for everyday use.  Encouraged activity and weight loss as second help as well.  I also encouraged less caffeine and more water.

## 2022-06-22 LAB
ALBUMIN SERPL-MCNC: 4.4 G/DL (ref 3.8–4.8)
ALBUMIN/GLOB SERPL: 2.3 {RATIO} (ref 1.2–2.2)
ALP SERPL-CCNC: 126 IU/L (ref 44–121)
ALT SERPL-CCNC: 26 IU/L (ref 0–44)
AST SERPL-CCNC: 26 IU/L (ref 0–40)
BASOPHILS # BLD AUTO: 0 X10E3/UL (ref 0–0.2)
BASOPHILS NFR BLD AUTO: 1 %
BILIRUB SERPL-MCNC: 0.6 MG/DL (ref 0–1.2)
BUN SERPL-MCNC: 12 MG/DL (ref 8–27)
BUN/CREAT SERPL: 13 (ref 10–24)
CALCIUM SERPL-MCNC: 9.4 MG/DL (ref 8.6–10.2)
CHLORIDE SERPL-SCNC: 106 MMOL/L (ref 96–106)
CHOLEST SERPL-MCNC: 124 MG/DL (ref 100–199)
CO2 SERPL-SCNC: 24 MMOL/L (ref 20–29)
CREAT SERPL-MCNC: 0.89 MG/DL (ref 0.76–1.27)
EGFRCR SERPLBLD CKD-EPI 2021: 97 ML/MIN/1.73
EOSINOPHIL # BLD AUTO: 0.1 X10E3/UL (ref 0–0.4)
EOSINOPHIL NFR BLD AUTO: 2 %
ERYTHROCYTE [DISTWIDTH] IN BLOOD BY AUTOMATED COUNT: 13.2 % (ref 11.6–15.4)
GLOBULIN SER CALC-MCNC: 1.9 G/DL (ref 1.5–4.5)
GLUCOSE SERPL-MCNC: 73 MG/DL (ref 65–99)
HCT VFR BLD AUTO: 46.2 % (ref 37.5–51)
HDLC SERPL-MCNC: 39 MG/DL
HGB BLD-MCNC: 15.3 G/DL (ref 13–17.7)
IMM GRANULOCYTES # BLD AUTO: 0.1 X10E3/UL (ref 0–0.1)
IMM GRANULOCYTES NFR BLD AUTO: 1 %
LDLC SERPL CALC-MCNC: 66 MG/DL (ref 0–99)
LYMPHOCYTES # BLD AUTO: 1.9 X10E3/UL (ref 0.7–3.1)
LYMPHOCYTES NFR BLD AUTO: 29 %
MCH RBC QN AUTO: 30.3 PG (ref 26.6–33)
MCHC RBC AUTO-ENTMCNC: 33.1 G/DL (ref 31.5–35.7)
MCV RBC AUTO: 92 FL (ref 79–97)
MONOCYTES # BLD AUTO: 0.7 X10E3/UL (ref 0.1–0.9)
MONOCYTES NFR BLD AUTO: 10 %
NEUTROPHILS # BLD AUTO: 4 X10E3/UL (ref 1.4–7)
NEUTROPHILS NFR BLD AUTO: 57 %
PLATELET # BLD AUTO: 235 X10E3/UL (ref 150–450)
POTASSIUM SERPL-SCNC: 4.2 MMOL/L (ref 3.5–5.2)
PROT SERPL-MCNC: 6.3 G/DL (ref 6–8.5)
RBC # BLD AUTO: 5.05 X10E6/UL (ref 4.14–5.8)
SODIUM SERPL-SCNC: 142 MMOL/L (ref 134–144)
T4 FREE SERPL-MCNC: 1.39 NG/DL (ref 0.82–1.77)
T4 SERPL-MCNC: 9.2 UG/DL (ref 4.5–12)
TESTOST SERPL-MCNC: 417 NG/DL (ref 264–916)
TRIGL SERPL-MCNC: 100 MG/DL (ref 0–149)
TSH SERPL DL<=0.005 MIU/L-ACNC: 1.98 UIU/ML (ref 0.45–4.5)
VLDLC SERPL CALC-MCNC: 19 MG/DL (ref 5–40)
WBC # BLD AUTO: 6.8 X10E3/UL (ref 3.4–10.8)

## 2022-11-14 ENCOUNTER — OFFICE VISIT (OUTPATIENT)
Dept: CARDIOLOGY | Facility: CLINIC | Age: 62
End: 2022-11-14

## 2022-11-14 VITALS
HEIGHT: 74 IN | DIASTOLIC BLOOD PRESSURE: 90 MMHG | WEIGHT: 255 LBS | BODY MASS INDEX: 32.73 KG/M2 | HEART RATE: 70 BPM | SYSTOLIC BLOOD PRESSURE: 148 MMHG

## 2022-11-14 DIAGNOSIS — I25.10 CORONARY ARTERY DISEASE INVOLVING NATIVE CORONARY ARTERY OF NATIVE HEART WITHOUT ANGINA PECTORIS: Primary | ICD-10-CM

## 2022-11-14 PROCEDURE — 99213 OFFICE O/P EST LOW 20 MIN: CPT | Performed by: INTERNAL MEDICINE

## 2022-11-14 NOTE — PROGRESS NOTES
"    Subjective:     Encounter Date: 11/14/22        Patient ID: SIMON Gomez Jr. is a 62 y.o. male.    Chief Complaint: CAD    This is a 62 year old male with history of CAD, HTN, HLD, & hypothyroidism who came into the hospital in December 2018 for chest pain. EKG ruled him in for STEMI and he underwent cardiac catheterization which found occluded circumflex. He received a 3x15mm Xience Cindy REMA to the proximal circumflex . He also had severe mid LAD stenosis, which was intervened on in early 2019 with 3 overlapping drug eluting stents to the proximal-mid LAD.  LV function is preserved.     He is doing well.  No angina or dyspnea.  Really trying to lose weight now.  Goal is about 20 pounds.  Lifting weights for 5 times a week.  Works in [employer].  Loves to ride motorcycles.    REVIEW OF SYSTEMS:   All systems reviewed.  Pertinent positives identified in HPI.  All other systems are negative.    The following portions of the patient's history were reviewed and updated as appropriate: allergies, current medications, past family history, past medical history, past social history, past surgical history and problem list.    Past Medical History:   Diagnosis Date   • A-fib ([facility])    • Abnormal heart rhythm     HISTORY OF OF (PT STATES EP STUDY BY DR BENNETT YEARS AGO), FOLLOWED BY DR GALEANO CURRENTLY   • Anesthesia complication     PT STATES BECAME \"HYPOXIC UNDER GAS\" YEARS AGO    • Colorblind    • Coronary artery disease involving native coronary artery of native heart with angina pectoris ([facility])    • Ex-smoker    • History of COVID-19 01/2021    BILAT PNEUMONIA    • History of GI bleed    • Hyperlipidemia LDL goal <70 1/2/2019   • Hypertension    • Hypogonadism in male 10/21/2016   • Hypothyroidism    • ST elevation myocardial infarction (STEMI) ([facility]) 12/26/2018   • Tear of meniscus of knee    • Testosterone deficiency        Family History   Problem Relation Age of Onset   • Arthritis Mother    • No Known Problems Father  "   • No Known Problems Brother    • No Known Problems Daughter    • No Known Problems Brother    • Malig Hyperthermia Neg Hx        Social History     Socioeconomic History   • Marital status:    Tobacco Use   • Smoking status: Former     Packs/day: 0.00     Years: 0.00     Pack years: 0.00     Types: Cigars, Cigarettes     Quit date:      Years since quittin.8   • Smokeless tobacco: Never   • Tobacco comments:     SOCIAL CIGAR SMOKING ONLY QUIT    Vaping Use   • Vaping Use: Never used   Substance and Sexual Activity   • Alcohol use: Yes     Alcohol/week: 0.0 standard drinks     Comment: RARE   • Drug use: No   • Sexual activity: Defer     Partners: Female       Allergies   Allergen Reactions   • Meperidine Anaphylaxis     Per patient's wife, he stopped breathing   • Hydrocodone-Acetaminophen Hallucinations   • Oxycodone-Acetaminophen Hallucinations       Past Surgical History:   Procedure Laterality Date   • ADENOIDECTOMY     • ANGIOPLASTY      STENT PLACEMENT X 5 (,)    • BACK SURGERY      L5-S1   • CARDIAC CATHETERIZATION N/A 2018    Procedure: Left Heart Cath;  Surgeon: Анна Booker MD;  [facility];  Service: Cardiovascular   • CARDIAC CATHETERIZATION N/A 2018    Procedure: Coronary angiography;  Surgeon: Анна Booker MD;  Location:  [facility] CATH INVASIVE LOCATION;  Service: Cardiovascular   • CARDIAC CATHETERIZATION N/A 2018    Procedure: Stent REMA coronary;  Surgeon: Анна Booker MD;  Location:  [facility] CATH INVASIVE LOCATION;  Service: Cardiovascular   • CARDIAC CATHETERIZATION  2018    Procedure: Percutaneous Mechanical Thrombectomy;  Surgeon: Анна Booker MD;  [facility];  Service: Cardiovascular   • CARDIAC CATHETERIZATION N/A 2019    Procedure: Coronary angiography;  Surgeon: Анна Booker MD;  Location:  [facility] CATH INVASIVE LOCATION;  Service: Cardiology   • CARDIAC CATHETERIZATION N/A  2/13/2019    Procedure: Stent REMA coronary;  Surgeon: Анна Booker MD;  Location:  [facility] CATH INVASIVE LOCATION;  Service: Cardiology   • CARDIAC CATHETERIZATION N/A 2/13/2019    Procedure: Left Heart Cath;  Surgeon: Анна Booker MD;  Location:  [facility] CATH INVASIVE LOCATION;  Service: Cardiology   • CARDIAC CATHETERIZATION N/A 2/13/2019    Procedure: Left ventriculography;  Surgeon: Анна Booker MD;  Location:  [facility] CATH INVASIVE LOCATION;  Service: Cardiology   • CARDIAC ELECTROPHYSIOLOGY STUDY AND ABLATION     • COLONOSCOPY     • COLONOSCOPY N/A 6/26/2017    Procedure: COLONOSCOPY AT BEDSIDE;  Surgeon: Fadi Gallagher MD;  [facility];  Service:    • EXTERNAL EAR SURGERY     • HAND SURGERY Left    • INGUINAL HERNIA REPAIR      X3   • KNEE ARTHROSCOPY Left 1/19/2022    Procedure: KNEE ARTHROSCOPY, PARTIAL MEDIAL MENISECTOMY, DEBRIDEMENT OF ARTHRITIS, REMOVAL OF LOSE BODIES;  Surgeon: Destiney Tucker MD;  Location:  [facility] OR [facility];  Service: Orthopedics;  Laterality: Left;   • MANDIBLE SURGERY      WIRED X 2  - HIGH SCHOOL   • NOSE SURGERY     • AR RT/LT HEART CATHETERS N/A 12/26/2018    Procedure: Percutaneous Coronary Intervention;  Surgeon: Анна Booker MD;  [facility];  Service: Cardiovascular   • ROTATOR CUFF REPAIR Right    • SHOULDER ARTHROSCOPY Right    • SHOULDER ARTHROSCOPY Left    • THYROIDECTOMY     • TONSILLECTOMY             Procedures       Objective:     Physical Exam     GEN: no distress, alert and oriented, obese  Lungs CTAB, no rales or wheezes  Chest: no abnormalities  Heart: RRR, no murmurs  Abdo: soft,  Nontender, nondistended  Extr: no edema, +2 DP and 2+ carotid pulses b/l  Skin: no rash or bruising  Psych: organized thought, normal behavior and affect        Assessment:          Diagnosis Plan   1. Coronary artery disease involving native coronary artery of native heart without angina pectoris               Plan:       1. CAD:   Lateral  STEMI December 2018 with REMA to proximal circumflex, followed by staged proximal LAD intervention using 3 overlapping REMA   Continue ASA/Plavix indefinitely due to long length of stents in the LAD  2. HTN:   Metoprolol 12.5 and Losartan 50.  Blood pressure is well controlled at home  3. HLD:   Atorva 80, at goal  4. Hypothyroidism  5.  Obesity: Discussed in detail      I will see him again in 6 months. Dr. Perez, thank you very much for referring this kind patient to me. Please call with any questions or concerns.        Анна Booker MD  11/14/22    Outpatient Encounter Medications as of 11/14/2022   Medication Sig Dispense Refill   • aspirin 81 MG chewable tablet Chew 1 tablet Daily. (Patient taking differently: Chew 1 tablet Daily. TO CONTINUE PER CARDIOLOGY)     • atorvastatin (LIPITOR) 80 MG tablet TAKE 1 TABLET BY MOUTH ONCE DAILY AT BEDTIME (Patient taking differently: Take 1 tablet by mouth Every Night.) 90 tablet 3   • Cholecalciferol (VITAMIN D PO) Take 10,000 Units by mouth Every Night.     • clopidogrel (PLAVIX) 75 MG tablet Take 1 tablet by mouth once daily (Patient taking differently: Take 1 tablet by mouth Daily. HELD FOR 1 WEEK PRIOR TO OR PER CARDIO) 90 tablet 3   • Cyanocobalamin (VITAMIN B 12 PO) Take 1,000 mcg by mouth Every Night.     • Euthyrox 150 MCG tablet Take 1 tablet by mouth once daily 90 tablet 0   • losartan (COZAAR) 50 MG tablet Take 1 tablet by mouth Every Night. 90 tablet 3   • metoprolol succinate XL (TOPROL-XL) 25 MG 24 hr tablet Take 0.5 tablets by mouth Every Night. 45 tablet 3   • tadalafil (Cialis) 20 MG tablet Take 1 tablet by mouth Daily As Needed for Erectile Dysfunction. (Patient taking differently: Take 1 tablet by mouth Daily As Needed for Erectile Dysfunction. TO HOLD X 48 HOURS) 30 tablet 0   • vitamin C (ASCORBIC ACID) 250 MG tablet Take 250 mg by mouth Every Night.       No facility-administered encounter medications on file as of 11/14/2022.

## 2022-12-19 RX ORDER — ATORVASTATIN CALCIUM 80 MG/1
80 TABLET, FILM COATED ORAL NIGHTLY
Qty: 90 TABLET | Refills: 1 | Status: SHIPPED | OUTPATIENT
Start: 2022-12-19

## 2022-12-21 ENCOUNTER — TELEPHONE (OUTPATIENT)
Dept: CARDIOLOGY | Facility: CLINIC | Age: 62
End: 2022-12-21

## 2022-12-21 NOTE — TELEPHONE ENCOUNTER
Called Kyleigh romero w/ [facility] with my fax # so she can fax the form for cardiac clearance, also I will reference msg with holding Plavix per Dr. Ade villanueva [facility]  12/21/22

## 2023-02-17 ENCOUNTER — TELEPHONE (OUTPATIENT)
Dept: FAMILY MEDICINE CLINIC | Facility: CLINIC | Age: 63
End: 2023-02-17

## 2023-02-17 NOTE — TELEPHONE ENCOUNTER
Caller: JasonDonna    Relationship: Emergency Contact    Best call back number: [phone]    Who are you requesting to speak with (clinical staff, provider,  specific staff member): NGOZI JOSEPH     What was the call regarding:  WANTS TO KNOW IF SSHE WOULD TAKE HIM BACK ON AS A PATIENT NOW THAT DR. PLATT IS LEAVING WAS SEEING HER IN THE PAST     Do you require a callback:YES

## 2023-02-20 NOTE — TELEPHONE ENCOUNTER
CALLED AND LVM FOR PT TO CALL BACK AND ASK FOR ME.     HUB OKAY TO TRANSFER TO [facility] IN OFFICE IF PT CALLS BACK. THANK YOU.

## 2023-02-28 ENCOUNTER — OFFICE VISIT (OUTPATIENT)
Dept: FAMILY MEDICINE CLINIC | Facility: CLINIC | Age: 63
End: 2023-02-28
Payer: COMMERCIAL

## 2023-02-28 VITALS
SYSTOLIC BLOOD PRESSURE: 136 MMHG | OXYGEN SATURATION: 98 % | WEIGHT: 256.3 LBS | HEART RATE: 85 BPM | RESPIRATION RATE: 18 BRPM | TEMPERATURE: 96.9 F | BODY MASS INDEX: 32.89 KG/M2 | DIASTOLIC BLOOD PRESSURE: 76 MMHG | HEIGHT: 74 IN

## 2023-02-28 DIAGNOSIS — E89.0 POSTOPERATIVE HYPOTHYROIDISM: ICD-10-CM

## 2023-02-28 DIAGNOSIS — Z12.11 SCREENING FOR MALIGNANT NEOPLASM OF COLON: ICD-10-CM

## 2023-02-28 DIAGNOSIS — E78.5 HYPERLIPIDEMIA LDL GOAL <70: ICD-10-CM

## 2023-02-28 DIAGNOSIS — I10 PRIMARY HYPERTENSION: ICD-10-CM

## 2023-02-28 DIAGNOSIS — Z12.5 SCREENING FOR PROSTATE CANCER: ICD-10-CM

## 2023-02-28 DIAGNOSIS — Z86.39 HISTORY OF HYPOGONADISM: ICD-10-CM

## 2023-02-28 DIAGNOSIS — Z00.00 ANNUAL PHYSICAL EXAM: Primary | ICD-10-CM

## 2023-02-28 PROCEDURE — 90677 PCV20 VACCINE IM: CPT | Performed by: NURSE PRACTITIONER

## 2023-02-28 PROCEDURE — 99396 PREV VISIT EST AGE 40-64: CPT | Performed by: NURSE PRACTITIONER

## 2023-02-28 PROCEDURE — 90471 IMMUNIZATION ADMIN: CPT | Performed by: NURSE PRACTITIONER

## 2023-02-28 RX ORDER — LEVOTHYROXINE SODIUM 0.15 MG/1
150 TABLET ORAL DAILY
Qty: 90 TABLET | Refills: 1 | Status: SHIPPED | OUTPATIENT
Start: 2023-02-28

## 2023-03-06 RX ORDER — CLOPIDOGREL BISULFATE 75 MG/1
75 TABLET ORAL DAILY
Qty: 90 TABLET | Refills: 3 | Status: SHIPPED | OUTPATIENT
Start: 2023-03-06

## 2023-04-04 ENCOUNTER — TELEPHONE (OUTPATIENT)
Dept: CARDIOLOGY | Facility: CLINIC | Age: 63
End: 2023-04-04
Payer: COMMERCIAL

## 2023-04-04 NOTE — TELEPHONE ENCOUNTER
Yes that's fine   Suturegard Body: The suture ends were repeatedly re-tightened and re-clamped to achieve the desired tissue expansion.

## 2023-04-04 NOTE — TELEPHONE ENCOUNTER
rec'd fax from Dr. Lizandro Lopez, w/ [facility], asking for cardiac clearance.  Pt is being scheduled for a Colonoscopy, they are asking for clearance, and permission to hold Plavix 5 days prior to procedure.  He also takes ASA 81mg qd.    Please advise.    Thank you,    NEIL Oliveira

## 2023-05-15 ENCOUNTER — OFFICE VISIT (OUTPATIENT)
Dept: CARDIOLOGY | Facility: CLINIC | Age: 63
End: 2023-05-15
Payer: COMMERCIAL

## 2023-05-15 VITALS
DIASTOLIC BLOOD PRESSURE: 90 MMHG | WEIGHT: 259 LBS | SYSTOLIC BLOOD PRESSURE: 142 MMHG | HEIGHT: 74 IN | BODY MASS INDEX: 33.24 KG/M2 | HEART RATE: 55 BPM | OXYGEN SATURATION: 98 %

## 2023-05-15 DIAGNOSIS — I25.10 CORONARY ARTERY DISEASE INVOLVING NATIVE CORONARY ARTERY OF NATIVE HEART WITHOUT ANGINA PECTORIS: Primary | ICD-10-CM

## 2023-05-15 PROCEDURE — 93000 ELECTROCARDIOGRAM COMPLETE: CPT | Performed by: INTERNAL MEDICINE

## 2023-05-15 PROCEDURE — 99214 OFFICE O/P EST MOD 30 MIN: CPT | Performed by: INTERNAL MEDICINE

## 2023-05-15 NOTE — PROGRESS NOTES
"    Subjective:     Encounter Date: 05/15/23        Patient ID: SIMON Gomez Jr. is a 63 y.o. male.    Chief Complaint: CAD    This is a 63 year old male with history of CAD, HTN, HLD, & hypothyroidism who came into the hospital in December 2018 for chest pain. EKG ruled him in for STEMI and he underwent cardiac catheterization which found occluded circumflex. He received a 3x15mm Xience Cindy REMA to the proximal circumflex . He also had severe mid LAD stenosis, which was intervened on in early 2019 with 3 overlapping drug eluting stents to the proximal-mid LAD.  LV function is preserved.     He is doing well.  He has no complaints.  He has intermittent tachycardia/palpitations which last seconds at a time.  He had cataract surgery about 6 months ago and has had some complications from that.  Was not cleared to exercise again until last week.    REVIEW OF SYSTEMS:   All systems reviewed.  Pertinent positives identified in HPI.  All other systems are negative.    The following portions of the patient's history were reviewed and updated as appropriate: allergies, current medications, past family history, past medical history, past social history, past surgical history and problem list.    Past Medical History:   Diagnosis Date   • A-fib    • Abnormal heart rhythm     HISTORY OF OF (PT STATES EP STUDY BY DR BENNETT YEARS AGO), FOLLOWED BY DR GALEANO CURRENTLY   • Anesthesia complication     PT STATES BECAME \"HYPOXIC UNDER GAS\" YEARS AGO    • Colorblind    • Coronary artery disease involving native coronary artery of native heart with angina pectoris    • Ex-smoker    • History of COVID-19 01/2021    BILAT PNEUMONIA    • History of GI bleed    • Hyperlipidemia LDL goal <70 01/02/2019   • Hypertension    • Hypogonadism in male 10/21/2016   • Hypothyroidism    • Pneumonia due to COVID-19 virus    • ST elevation myocardial infarction (STEMI) 12/26/2018   • Tear of meniscus of knee    • Testosterone deficiency        Family " History   Problem Relation Age of Onset   • Arthritis Mother    • No Known Problems Father    • No Known Problems Brother    • No Known Problems Daughter    • No Known Problems Brother    • Malig Hyperthermia Neg Hx        Social History     Socioeconomic History   • Marital status:    Tobacco Use   • Smoking status: Former     Types: Cigars   • Smokeless tobacco: Never   • Tobacco comments:     SOCIAL CIGAR SMOKING ONLY QUIT 2009   Vaping Use   • Vaping Use: Never used   Substance and Sexual Activity   • Alcohol use: Yes     Comment: Rarely   • Drug use: No   • Sexual activity: Defer     Partners: Female       Allergies   Allergen Reactions   • Meperidine Anaphylaxis     Per patient's wife, he stopped breathing   • Hydrocodone-Acetaminophen Hallucinations   • Oxycodone-Acetaminophen Hallucinations       Past Surgical History:   Procedure Laterality Date   • ADENOIDECTOMY     • ANGIOPLASTY      STENT PLACEMENT X 5 (2018,2019)    • BACK SURGERY      L5-S1   • CARDIAC CATHETERIZATION N/A 12/26/2018    Procedure: Left Heart Cath;  Surgeon: Анна Booker MD;  Location:  [facility] CATH INVASIVE LOCATION;  Service: Cardiovascular   • CARDIAC CATHETERIZATION N/A 12/26/2018    Procedure: Coronary angiography;  Surgeon: Анна Booker MD;  Location:  [facility] CATH INVASIVE LOCATION;  Service: Cardiovascular   • CARDIAC CATHETERIZATION N/A 12/26/2018    Procedure: Stent REMA coronary;  Surgeon: Анна Booker MD;  Location:  [facility] CATH INVASIVE LOCATION;  Service: Cardiovascular   • CARDIAC CATHETERIZATION  12/26/2018    Procedure: Percutaneous Mechanical Thrombectomy;  Surgeon: Анна Booker MD;  Location:  [facility] CATH INVASIVE LOCATION;  Service: Cardiovascular   • CARDIAC CATHETERIZATION N/A 02/13/2019    Procedure: Coronary angiography;  Surgeon: Анна Booker MD;  Location:  [facility] CATH INVASIVE LOCATION;  Service: Cardiology   • CARDIAC CATHETERIZATION N/A 02/13/2019    Procedure: Stent REMA coronary;  Surgeon: Ade  MD Анна;  [facility];  Service: Cardiology   • CARDIAC CATHETERIZATION N/A 02/13/2019    Procedure: Left Heart Cath;  Surgeon: Анна Booker MD;  [facility];  Service: Cardiology   • CARDIAC CATHETERIZATION N/A 02/13/2019    Procedure: Left ventriculography;  Surgeon: Анна Booker MD;  [facility];  Service: Cardiology   • CARDIAC CATHETERIZATION  2/13/2019   • CARDIAC ELECTROPHYSIOLOGY STUDY AND ABLATION     • CATARACT EXTRACTION Right 01/04/2023   • CATARACT EXTRACTION Left 01/11/2023   • CATARACT EXTRACTION, BILATERAL     • COLONOSCOPY     • COLONOSCOPY N/A 06/26/2017    Procedure: COLONOSCOPY AT BEDSIDE;  Surgeon: Fadi Gallagher MD;  Location: [facility] ENDOSCOPY;  Service:    • EXTERNAL EAR SURGERY     • HAND SURGERY Left    • INGUINAL HERNIA REPAIR      X3   • KNEE ARTHROSCOPY Left 01/19/2022    Procedure: KNEE ARTHROSCOPY, PARTIAL MEDIAL MENISECTOMY, DEBRIDEMENT OF ARTHRITIS, REMOVAL OF LOSE BODIES;  Surgeon: Destiney Tucker MD;  Location:  [facility];  Service: Orthopedics;  Laterality: Left;   • MANDIBLE SURGERY      WIRED X 2  - HIGH SCHOOL   • NOSE SURGERY     • MO RT/LT HEART CATHETERS N/A 12/26/2018    Procedure: Percutaneous Coronary Intervention;  Surgeon: Анна Booker MD;  Location: [facility] CATH INVASIVE LOCATION;  Service: Cardiovascular   • ROTATOR CUFF REPAIR Right    • SHOULDER ARTHROSCOPY Right    • SHOULDER ARTHROSCOPY Left    • THYROIDECTOMY     • TONSILLECTOMY               ECG 12 Lead    Date/Time: 5/15/2023 10:42 AM  Performed by: Анна Booker MD  Authorized by: Анна Booker MD   Comparison: compared with previous ECG from 1/10/2022  Similar to previous ECG  Rhythm: sinus rhythm  Rate: normal  Conduction: conduction normal  ST Segments: ST segments normal  T Waves: T waves normal  QRS axis: left  Other: no other findings    Clinical impression: normal ECG               Objective:     Physical  Exam     GEN: no distress, alert and oriented, obese  Lungs CTAB, no rales or wheezes  Chest: no abnormalities  Heart: RRR, no murmurs  Abdo: soft,  Nontender, nondistended  Extr: no edema, +2 DP and 2+ carotid pulses b/l  Skin: no rash or bruising  Psych: organized thought, normal behavior and affect        Assessment:          Diagnosis Plan   1. Coronary artery disease involving native coronary artery of native heart without angina pectoris               Plan:       1. CAD:  Lateral STEMI December 2018 with REMA to proximal circumflex, followed by staged proximal LAD intervention using 3 overlapping REMA   Continue Plavix.  Stop aspirin.  2. HTN:   Metoprolol 12.5 and Losartan 50.  Blood pressure is well controlled at home  3. HLD:  Atorva 80, at goal  4. Hypothyroidism  5.  Obesity: Discussed in detail.  Goal to get close to 220 pounds      Clarissa, thank you referring this kind patient to me.  Please call with any questions or concerns.  I will see him again in 6 months.       Анна Booker MD  05/15/23    Outpatient Encounter Medications as of 5/15/2023   Medication Sig Dispense Refill   • aspirin 81 MG chewable tablet Chew 1 tablet Daily. (Patient taking differently: Chew 1 tablet Daily. TO CONTINUE PER CARDIOLOGY)     • atorvastatin (LIPITOR) 80 MG tablet Take 1 tablet by mouth Every Night. 90 tablet 1   • Cholecalciferol (VITAMIN D PO) Take 10,000 Units by mouth Every Night.     • clopidogrel (PLAVIX) 75 MG tablet Take 1 tablet by mouth Daily. 90 tablet 3   • Cyanocobalamin (VITAMIN B 12 PO) Take 1,000 mcg by mouth Every Night.     • levothyroxine (Euthyrox) 150 MCG tablet Take 1 tablet by mouth Daily. 90 tablet 1   • losartan (COZAAR) 50 MG tablet Take 1 tablet by mouth Every Night. 90 tablet 3   • metoprolol succinate XL (TOPROL-XL) 25 MG 24 hr tablet Take 0.5 tablets by mouth Every Night. 45 tablet 3   • tadalafil (Cialis) 20 MG tablet Take 1 tablet by mouth Daily As Needed for Erectile Dysfunction. (Patient  taking differently: Take 1 tablet by mouth Daily As Needed for Erectile Dysfunction. TO HOLD X 48 HOURS) 30 tablet 0   • vitamin C (ASCORBIC ACID) 250 MG tablet Take 1 tablet by mouth Every Night.       No facility-administered encounter medications on file as of 5/15/2023.

## 2023-05-16 RX ORDER — METOPROLOL SUCCINATE 25 MG/1
TABLET, EXTENDED RELEASE ORAL
Qty: 45 TABLET | Refills: 0 | Status: SHIPPED | OUTPATIENT
Start: 2023-05-16

## 2023-05-16 RX ORDER — LOSARTAN POTASSIUM 50 MG/1
TABLET ORAL
Qty: 90 TABLET | Refills: 0 | Status: SHIPPED | OUTPATIENT
Start: 2023-05-16

## 2023-05-16 NOTE — TELEPHONE ENCOUNTER
Rx Refill Note  Requested Prescriptions     Pending Prescriptions Disp Refills    losartan (COZAAR) 50 MG tablet [Pharmacy Med Name: Losartan Potassium 50 MG Oral Tablet] 90 tablet 0     Sig: TAKE 1 TABLET BY MOUTH ONCE DAILY AT NIGHT    metoprolol succinate XL (TOPROL-XL) 25 MG 24 hr tablet [Pharmacy Med Name: Metoprolol Succinate ER 25 MG Oral Tablet Extended Release 24 Hour] 45 tablet 0     Sig: TAKE 1/2 (ONE-HALF) TABLET BY MOUTH ONCE DAILY AT NIGHT      Last office visit with prescribing clinician: 5/15/2023   Last telemedicine visit with prescribing clinician: 5/15/2023   Next office visit with prescribing clinician: Visit date not found                         Would you like a call back once the refill request has been completed: [] Yes [] No    If the office needs to give you a call back, can they leave a voicemail: [] Yes [] No    Fiorella Caldera MA  05/16/23, 08:54 EDT

## 2023-06-07 ENCOUNTER — AMBULATORY SURGICAL CENTER (AMBULATORY)
Dept: URBAN - METROPOLITAN AREA SURGERY 17 | Facility: SURGERY | Age: 63
End: 2023-06-07
Payer: COMMERCIAL

## 2023-06-07 ENCOUNTER — OFFICE (AMBULATORY)
Dept: URBAN - METROPOLITAN AREA PATHOLOGY 4 | Facility: PATHOLOGY | Age: 63
End: 2023-06-07
Payer: COMMERCIAL

## 2023-06-07 VITALS
DIASTOLIC BLOOD PRESSURE: 79 MMHG | SYSTOLIC BLOOD PRESSURE: 115 MMHG | TEMPERATURE: 97.4 F | SYSTOLIC BLOOD PRESSURE: 131 MMHG | DIASTOLIC BLOOD PRESSURE: 61 MMHG | DIASTOLIC BLOOD PRESSURE: 80 MMHG | DIASTOLIC BLOOD PRESSURE: 75 MMHG | HEART RATE: 62 BPM | SYSTOLIC BLOOD PRESSURE: 149 MMHG | TEMPERATURE: 97.2 F | OXYGEN SATURATION: 94 % | DIASTOLIC BLOOD PRESSURE: 91 MMHG | HEART RATE: 70 BPM | RESPIRATION RATE: 16 BRPM | RESPIRATION RATE: 13 BRPM | HEART RATE: 63 BPM | SYSTOLIC BLOOD PRESSURE: 154 MMHG | DIASTOLIC BLOOD PRESSURE: 70 MMHG | RESPIRATION RATE: 14 BRPM | SYSTOLIC BLOOD PRESSURE: 103 MMHG | HEART RATE: 68 BPM | HEART RATE: 69 BPM | OXYGEN SATURATION: 98 % | HEART RATE: 74 BPM | SYSTOLIC BLOOD PRESSURE: 109 MMHG | HEART RATE: 73 BPM | HEART RATE: 72 BPM | RESPIRATION RATE: 12 BRPM | TEMPERATURE: 97.3 F | OXYGEN SATURATION: 97 % | HEIGHT: 74 IN | DIASTOLIC BLOOD PRESSURE: 58 MMHG | WEIGHT: 245 LBS | DIASTOLIC BLOOD PRESSURE: 77 MMHG | SYSTOLIC BLOOD PRESSURE: 125 MMHG | SYSTOLIC BLOOD PRESSURE: 148 MMHG | DIASTOLIC BLOOD PRESSURE: 66 MMHG | HEART RATE: 56 BPM | RESPIRATION RATE: 15 BRPM | SYSTOLIC BLOOD PRESSURE: 124 MMHG | DIASTOLIC BLOOD PRESSURE: 73 MMHG

## 2023-06-07 DIAGNOSIS — D12.3 BENIGN NEOPLASM OF TRANSVERSE COLON: ICD-10-CM

## 2023-06-07 DIAGNOSIS — D12.0 BENIGN NEOPLASM OF CECUM: ICD-10-CM

## 2023-06-07 DIAGNOSIS — Z86.010 PERSONAL HISTORY OF COLONIC POLYPS: ICD-10-CM

## 2023-06-07 DIAGNOSIS — K57.30 DIVERTICULOSIS OF LARGE INTESTINE WITHOUT PERFORATION OR ABS: ICD-10-CM

## 2023-06-07 PROBLEM — K63.5 POLYP OF COLON: Status: ACTIVE | Noted: 2023-06-07

## 2023-06-07 LAB
GI HISTOLOGY: A. SELECT: (no result)
GI HISTOLOGY: B. SELECT: (no result)
GI HISTOLOGY: PDF REPORT: (no result)

## 2023-06-07 PROCEDURE — 45380 COLONOSCOPY AND BIOPSY: CPT | Mod: 33,59 | Performed by: INTERNAL MEDICINE

## 2023-06-07 PROCEDURE — 88305 TISSUE EXAM BY PATHOLOGIST: CPT | Performed by: INTERNAL MEDICINE

## 2023-06-07 PROCEDURE — 45385 COLONOSCOPY W/LESION REMOVAL: CPT | Mod: 33 | Performed by: INTERNAL MEDICINE

## 2023-06-16 RX ORDER — ATORVASTATIN CALCIUM 80 MG/1
TABLET, FILM COATED ORAL
Qty: 90 TABLET | Refills: 3 | Status: SHIPPED | OUTPATIENT
Start: 2023-06-16

## 2023-08-15 RX ORDER — LOSARTAN POTASSIUM 50 MG/1
50 TABLET ORAL NIGHTLY
Qty: 90 TABLET | Refills: 1 | Status: SHIPPED | OUTPATIENT
Start: 2023-08-15

## 2023-08-15 RX ORDER — METOPROLOL SUCCINATE 25 MG/1
12.5 TABLET, EXTENDED RELEASE ORAL NIGHTLY
Qty: 45 TABLET | Refills: 1 | Status: SHIPPED | OUTPATIENT
Start: 2023-08-15

## 2023-08-17 RX ORDER — LEVOTHYROXINE SODIUM 0.15 MG/1
TABLET ORAL
Qty: 90 TABLET | Refills: 1 | Status: SHIPPED | OUTPATIENT
Start: 2023-08-17

## 2023-08-17 NOTE — TELEPHONE ENCOUNTER
Rx Refill Note  Requested Prescriptions     Pending Prescriptions Disp Refills    levothyroxine (SYNTHROID, LEVOTHROID) 150 MCG tablet [Pharmacy Med Name: Levothyroxine Sodium 150 MCG Oral Tablet] 90 tablet 0     Sig: Take 1 tablet by mouth once daily      Last office visit with prescribing clinician: 2/28/2023   Last telemedicine visit with prescribing clinician: Visit date not found   Next office visit with prescribing clinician: Visit date not found       {TIP  Please add Last Relevant Lab Date if appropriate: 06/21/22                 Would you like a call back once the refill request has been completed: [] Yes [] No    If the office needs to give you a call back, can they leave a voicemail: [] Yes [] No    Sara Etienne MA  08/17/23, 17:18 EDT

## 2023-11-15 ENCOUNTER — OFFICE VISIT (OUTPATIENT)
Dept: CARDIOLOGY | Facility: CLINIC | Age: 63
End: 2023-11-15
Payer: COMMERCIAL

## 2023-11-15 VITALS
OXYGEN SATURATION: 97 % | HEIGHT: 74 IN | WEIGHT: 252.8 LBS | BODY MASS INDEX: 32.44 KG/M2 | HEART RATE: 62 BPM | DIASTOLIC BLOOD PRESSURE: 72 MMHG | SYSTOLIC BLOOD PRESSURE: 125 MMHG

## 2023-11-15 DIAGNOSIS — E78.5 HYPERLIPIDEMIA LDL GOAL <70: ICD-10-CM

## 2023-11-15 DIAGNOSIS — I25.118 CORONARY ARTERY DISEASE OF NATIVE ARTERY OF NATIVE HEART WITH STABLE ANGINA PECTORIS: ICD-10-CM

## 2023-11-15 DIAGNOSIS — I10 PRIMARY HYPERTENSION: ICD-10-CM

## 2023-11-15 DIAGNOSIS — Z95.5 S/P DRUG ELUTING CORONARY STENT PLACEMENT: Primary | ICD-10-CM

## 2023-11-15 PROCEDURE — 93000 ELECTROCARDIOGRAM COMPLETE: CPT | Performed by: NURSE PRACTITIONER

## 2023-11-15 PROCEDURE — 99214 OFFICE O/P EST MOD 30 MIN: CPT | Performed by: NURSE PRACTITIONER

## 2023-11-15 NOTE — PROGRESS NOTES
"  Date of Office Visit: 11/15/2023  Encounter Provider: TASHA Escamilla  Place of Service: [facility] CARDIOLOGY  Patient Name: SIMON Gomez Jr.  :1960    Chief Complaint   Patient presents with    6 month follow up    Coronary Artery Disease   :     HPI: SIMON Gomez Jr. is a 63 y.o. male who is a patient of  Dr. Galeano and is new to me today. He has a history of coronary disease and had a STEMI in 2018. His coronary angiography showed an occluded circumflex and he received a REMA to the proximal Circ. He also had a lesion in the LAD and he got a Stent there in . Ef has been normal. He also has hypertension, hypothyroidism and hyperlipidemia. He was last in the office in May and was having some intermittent palpitations. He was maintained on BB. BP was well controlled.     He comes in today for follow-up.  He has not been having any shortness of breath chest pain or dizziness.  He was going to the gym regularly but got a shoulder injury and this has restricted his gym time.  He has lost 7 pounds since his last visit with us however.  He plans to go back to the gym soon.  His cholesterol in October showed an LDL of 71 which was good.  Blood pressure has been stable.    Previous testing and notes have been reviewed by me.   Past Medical History:   Diagnosis Date    A-fib     Abnormal heart rhythm     HISTORY OF OF (PT STATES EP STUDY BY DR BENNETT YEARS AGO), FOLLOWED BY DR GALEANO CURRENTLY    Anesthesia complication     PT STATES BECAME \"HYPOXIC UNDER GAS\" YEARS AGO     Colorblind     Coronary artery disease involving native coronary artery of native heart with angina pectoris     Ex-smoker     History of COVID-19 2021    BILAT PNEUMONIA     History of GI bleed     Hyperlipidemia LDL goal <70 2019    Hypertension     Hypogonadism in male 10/21/2016    Hypothyroidism     Pneumonia due to COVID-19 virus     ST elevation myocardial infarction (STEMI) " 12/26/2018    Tear of meniscus of knee     Testosterone deficiency        Past Surgical History:   Procedure Laterality Date    ADENOIDECTOMY      ANGIOPLASTY      STENT PLACEMENT X 5 (2018,2019)     BACK SURGERY      L5-S1    CARDIAC CATHETERIZATION N/A 12/26/2018    Procedure: Left Heart Cath;  Surgeon: Анна Booker MD;  Location:  [facility] CATH INVASIVE LOCATION;  Service: Cardiovascular    CARDIAC CATHETERIZATION N/A 12/26/2018    Procedure: Coronary angiography;  Surgeon: Анна Booker MD;  Location:  [facility] CATH INVASIVE LOCATION;  Service: Cardiovascular    CARDIAC CATHETERIZATION N/A 12/26/2018    Procedure: Stent REMA coronary;  Surgeon: Анна Booker MD;  Location:  [facility] CATH INVASIVE LOCATION;  Service: Cardiovascular    CARDIAC CATHETERIZATION  12/26/2018    Procedure: Percutaneous Mechanical Thrombectomy;  Surgeon: Анна Booker MD;  Location:  [facility] CATH INVASIVE LOCATION;  Service: Cardiovascular    CARDIAC CATHETERIZATION N/A 02/13/2019    Procedure: Coronary angiography;  Surgeon: Анна Booker MD;  Location: [facility] CATH INVASIVE LOCATION;  Service: Cardiology    CARDIAC CATHETERIZATION N/A 02/13/2019    Procedure: Stent REMA coronary;  Surgeon: Анна Booker MD;  Location:  [facility] CATH INVASIVE LOCATION;  Service: Cardiology    CARDIAC CATHETERIZATION N/A 02/13/2019    Procedure: Left Heart Cath;  Surgeon: Анна Booker MD;  Location:  [facility] CATH INVASIVE LOCATION;  Service: Cardiology    CARDIAC CATHETERIZATION N/A 02/13/2019    Procedure: Left ventriculography;  Surgeon: Анна Booker MD;  Location: [facility] CATH INVASIVE LOCATION;  Service: Cardiology    CARDIAC CATHETERIZATION  2/13/2019    CARDIAC ELECTROPHYSIOLOGY STUDY AND ABLATION      CATARACT EXTRACTION Right 01/04/2023    CATARACT EXTRACTION Left 01/11/2023    CATARACT EXTRACTION, BILATERAL      COLONOSCOPY      COLONOSCOPY N/A 06/26/2017    Procedure: COLONOSCOPY AT BEDSIDE;  Surgeon: Fadi Gallagher MD;  Location: [facility] ENDOSCOPY;   Service:     EXTERNAL EAR SURGERY      HAND SURGERY Left     INGUINAL HERNIA REPAIR      X3    KNEE ARTHROSCOPY Left 01/19/2022    Procedure: KNEE ARTHROSCOPY, PARTIAL MEDIAL MENISECTOMY, DEBRIDEMENT OF ARTHRITIS, REMOVAL OF LOSE BODIES;  Surgeon: Destiney Tucker MD;  Location: [facility] OR [facility];  Service: Orthopedics;  Laterality: Left;    MANDIBLE SURGERY      WIRED X 2  - HIGH SCHOOL    NOSE SURGERY      NM RT/LT HEART CATHETERS N/A 12/26/2018    Procedure: Percutaneous Coronary Intervention;  Surgeon: Анна Booker MD;  Location: [facility] CATH INVASIVE LOCATION;  Service: Cardiovascular    ROTATOR CUFF REPAIR Right     SHOULDER ARTHROSCOPY Right     SHOULDER ARTHROSCOPY Left     THYROIDECTOMY      TONSILLECTOMY         Social History     Socioeconomic History    Marital status:    Tobacco Use    Smoking status: Former     Types: Cigars    Smokeless tobacco: Never    Tobacco comments:     SOCIAL CIGAR SMOKING ONLY QUIT 2009   Vaping Use    Vaping Use: Never used   Substance and Sexual Activity    Alcohol use: Yes     Comment: Rarely    Drug use: No    Sexual activity: Defer     Partners: Female       Family History   Problem Relation Age of Onset    Arthritis Mother     No Known Problems Father     No Known Problems Brother     No Known Problems Daughter     No Known Problems Brother     Malig Hyperthermia Neg Hx        Review of Systems   Constitutional: Negative for diaphoresis and malaise/fatigue.   Cardiovascular:  Negative for chest pain, claudication, dyspnea on exertion, irregular heartbeat, leg swelling, near-syncope, orthopnea, palpitations, paroxysmal nocturnal dyspnea and syncope.   Respiratory:  Negative for cough, shortness of breath and sleep disturbances due to breathing.    Musculoskeletal:  Negative for falls.   Neurological:  Negative for dizziness and weakness.   Psychiatric/Behavioral:  Negative for altered mental status and substance abuse.        Allergies   Allergen Reactions     "Meperidine Anaphylaxis     Per patient's wife, he stopped breathing    Hydrocodone-Acetaminophen Hallucinations    Oxycodone-Acetaminophen Hallucinations         Current Outpatient Medications:     atorvastatin (LIPITOR) 80 MG tablet, TAKE 1 TABLET BY MOUTH ONCE DAILY AT NIGHT, Disp: 90 tablet, Rfl: 3    Cholecalciferol (VITAMIN D PO), Take 10,000 Units by mouth Every Night., Disp: , Rfl:     clopidogrel (PLAVIX) 75 MG tablet, Take 1 tablet by mouth Daily., Disp: 90 tablet, Rfl: 3    Cyanocobalamin (VITAMIN B 12 PO), Take 1,000 mcg by mouth Every Night., Disp: , Rfl:     levothyroxine (SYNTHROID, LEVOTHROID) 150 MCG tablet, Take 1 tablet by mouth once daily, Disp: 90 tablet, Rfl: 1    losartan (COZAAR) 50 MG tablet, Take 1 tablet by mouth Every Night., Disp: 90 tablet, Rfl: 1    metoprolol succinate XL (TOPROL-XL) 25 MG 24 hr tablet, Take 0.5 tablets by mouth Every Night., Disp: 45 tablet, Rfl: 1    tadalafil (Cialis) 20 MG tablet, Take 1 tablet by mouth Daily As Needed for Erectile Dysfunction., Disp: 30 tablet, Rfl: 0    vitamin C (ASCORBIC ACID) 250 MG tablet, Take 1 tablet by mouth Every Night., Disp: , Rfl:       Objective:     Vitals:    11/15/23 1030   BP: 125/72   BP Location: Right arm   Patient Position: Sitting   Cuff Size: Adult   Pulse: 62   SpO2: 97%   Weight: 115 kg (252 lb 12.8 oz)   Height: 188 cm (74\")     Body mass index is 32.46 kg/m².    PHYSICAL EXAM:    Constitutional:       General: Not in acute distress.     Appearance: Normal appearance. Well-developed.   Eyes:      Pupils: Pupils are equal, round, and reactive to light.   HENT:      Head: Normocephalic.   Neck:      Vascular: No carotid bruit or JVD.   Pulmonary:      Effort: Pulmonary effort is normal. No tachypnea.      Breath sounds: Normal breath sounds. No wheezing. No rales.   Cardiovascular:      Normal rate. Regular rhythm.      No gallop.    Pulses:     Intact distal pulses.   Edema:     Peripheral edema absent.   Abdominal:      " General: Bowel sounds are normal.      Palpations: Abdomen is soft.      Tenderness: There is no abdominal tenderness.   Musculoskeletal: Normal range of motion.      Cervical back: Normal range of motion and neck supple. No edema. Skin:     General: Skin is warm and dry.   Neurological:      Mental Status: Alert and oriented to person, place, and time.         ECG 12 Lead    Date/Time: 11/15/2023 10:49 AM  Performed by: Helen Whittington APRN    Authorized by: Helen Whittington APRN  Comparison: compared with previous ECG from 5/15/2023  Similar to previous ECG  Rhythm: sinus rhythm  Ectopy: infrequent PVCs  Rate: normal  Conduction: non-specific intraventricular conduction delay  Q waves: III and aVF    QRS axis: normal    Clinical impression: non-specific ECG          Assessment:       Diagnosis Plan   1. S/P drug eluting coronary stent placement     On plavix and statin   2. Primary hypertension     Bp controlled on current regimen   3. Hyperlipidemia LDL goal <70     Cholesterol at goal on current regimen   4. Coronary artery disease of native artery of native heart with stable angina pectoris     No angina     Orders Placed This Encounter   Procedures    ECG 12 Lead     This order was created via procedure documentation     Order Specific Question:   Release to patient     Answer:   Routine Release [[id number]]          Plan:       Follow up in 6 months. Encouraged weight loss and exercise.          Your medication list            Accurate as of November 15, 2023 11:07 AM. If you have any questions, ask your nurse or doctor.                CONTINUE taking these medications        Instructions Last Dose Given Next Dose Due   atorvastatin 80 MG tablet  Commonly known as: LIPITOR      TAKE 1 TABLET BY MOUTH ONCE DAILY AT NIGHT       clopidogrel 75 MG tablet  Commonly known as: PLAVIX      Take 1 tablet by mouth Daily.       levothyroxine 150 MCG tablet  Commonly known as: SYNTHROID, LEVOTHROID      Take 1  tablet by mouth once daily       losartan 50 MG tablet  Commonly known as: COZAAR      Take 1 tablet by mouth Every Night.       metoprolol succinate XL 25 MG 24 hr tablet  Commonly known as: TOPROL-XL      Take 0.5 tablets by mouth Every Night.       tadalafil 20 MG tablet  Commonly known as: Cialis      Take 1 tablet by mouth Daily As Needed for Erectile Dysfunction.       VITAMIN B 12 PO      Take 1,000 mcg by mouth Every Night.       vitamin C 250 MG tablet  Commonly known as: ASCORBIC ACID      Take 1 tablet by mouth Every Night.       VITAMIN D PO      Take 10,000 Units by mouth Every Night.                  As always, it has been a pleasure to participate in your patient's care.      Sincerely,     Helen CORDOVA

## 2024-02-12 RX ORDER — METOPROLOL SUCCINATE 25 MG/1
12.5 TABLET, EXTENDED RELEASE ORAL NIGHTLY
Qty: 45 TABLET | Refills: 0 | Status: SHIPPED | OUTPATIENT
Start: 2024-02-12

## 2024-02-12 RX ORDER — LOSARTAN POTASSIUM 50 MG/1
50 TABLET ORAL NIGHTLY
Qty: 90 TABLET | Refills: 0 | Status: SHIPPED | OUTPATIENT
Start: 2024-02-12

## 2024-02-12 RX ORDER — CLOPIDOGREL BISULFATE 75 MG/1
75 TABLET ORAL DAILY
Qty: 90 TABLET | Refills: 0 | Status: SHIPPED | OUTPATIENT
Start: 2024-02-12

## 2024-02-13 RX ORDER — LEVOTHYROXINE SODIUM 0.15 MG/1
TABLET ORAL
Qty: 90 TABLET | Refills: 0 | Status: SHIPPED | OUTPATIENT
Start: 2024-02-13

## 2024-05-07 ENCOUNTER — TELEPHONE (OUTPATIENT)
Dept: CARDIOLOGY | Facility: CLINIC | Age: 64
End: 2024-05-07
Payer: COMMERCIAL

## 2024-05-13 RX ORDER — CLOPIDOGREL BISULFATE 75 MG/1
75 TABLET ORAL DAILY
Qty: 90 TABLET | Refills: 0 | Status: SHIPPED | OUTPATIENT
Start: 2024-05-13

## 2024-05-13 RX ORDER — METOPROLOL SUCCINATE 25 MG/1
12.5 TABLET, EXTENDED RELEASE ORAL NIGHTLY
Qty: 45 TABLET | Refills: 0 | Status: SHIPPED | OUTPATIENT
Start: 2024-05-13

## 2024-05-13 RX ORDER — LOSARTAN POTASSIUM 50 MG/1
50 TABLET ORAL NIGHTLY
Qty: 90 TABLET | Refills: 0 | Status: SHIPPED | OUTPATIENT
Start: 2024-05-13

## 2024-05-13 NOTE — TELEPHONE ENCOUNTER
Rx Refill Note  Requested Prescriptions     Pending Prescriptions Disp Refills    levothyroxine (SYNTHROID, LEVOTHROID) 150 MCG tablet [Pharmacy Med Name: Levothyroxine Sodium 150 MCG Oral Tablet] 90 tablet 0     Sig: Take 1 tablet by mouth once daily      Last office visit with prescribing clinician: 2/28/2023   Last telemedicine visit with prescribing clinician: Visit date not found   Next office visit with prescribing clinician: Visit date not found                         Would you like a call back once the refill request has been completed: [] Yes [] No    If the office needs to give you a call back, can they leave a voicemail: [] Yes [] No    Liliana Maxwell MA  05/13/24, 12:10 EDT

## 2024-05-14 RX ORDER — LEVOTHYROXINE SODIUM 0.15 MG/1
TABLET ORAL
Qty: 90 TABLET | Refills: 0 | Status: SHIPPED | OUTPATIENT
Start: 2024-05-14

## 2024-05-20 ENCOUNTER — LAB (OUTPATIENT)
Dept: LAB | Facility: HOSPITAL | Age: 64
End: 2024-05-20
Payer: COMMERCIAL

## 2024-05-20 ENCOUNTER — OFFICE VISIT (OUTPATIENT)
Age: 64
End: 2024-05-20
Payer: COMMERCIAL

## 2024-05-20 VITALS
HEART RATE: 60 BPM | DIASTOLIC BLOOD PRESSURE: 90 MMHG | SYSTOLIC BLOOD PRESSURE: 138 MMHG | OXYGEN SATURATION: 97 % | WEIGHT: 263 LBS | BODY MASS INDEX: 33.75 KG/M2 | HEIGHT: 74 IN

## 2024-05-20 DIAGNOSIS — I25.118 CORONARY ARTERY DISEASE OF NATIVE ARTERY OF NATIVE HEART WITH STABLE ANGINA PECTORIS: ICD-10-CM

## 2024-05-20 DIAGNOSIS — I25.118 CORONARY ARTERY DISEASE OF NATIVE ARTERY OF NATIVE HEART WITH STABLE ANGINA PECTORIS: Primary | ICD-10-CM

## 2024-05-20 LAB
ALBUMIN SERPL-MCNC: 4 G/DL (ref 3.5–5.2)
ALBUMIN/GLOB SERPL: 1.7 G/DL
ALP SERPL-CCNC: 100 U/L (ref 39–117)
ALT SERPL W P-5'-P-CCNC: 19 U/L (ref 1–41)
ANION GAP SERPL CALCULATED.3IONS-SCNC: 10.6 MMOL/L (ref 5–15)
AST SERPL-CCNC: 10 U/L (ref 1–40)
BILIRUB SERPL-MCNC: 0.6 MG/DL (ref 0–1.2)
BUN SERPL-MCNC: 13 MG/DL (ref 8–23)
BUN/CREAT SERPL: 13.1 (ref 7–25)
CALCIUM SPEC-SCNC: 8.9 MG/DL (ref 8.6–10.5)
CHLORIDE SERPL-SCNC: 108 MMOL/L (ref 98–107)
CO2 SERPL-SCNC: 22.4 MMOL/L (ref 22–29)
CREAT SERPL-MCNC: 0.99 MG/DL (ref 0.76–1.27)
DEPRECATED RDW RBC AUTO: 43.9 FL (ref 37–54)
EGFRCR SERPLBLD CKD-EPI 2021: 85.6 ML/MIN/1.73
ERYTHROCYTE [DISTWIDTH] IN BLOOD BY AUTOMATED COUNT: 13.1 % (ref 12.3–15.4)
GLOBULIN UR ELPH-MCNC: 2.3 GM/DL
GLUCOSE SERPL-MCNC: 91 MG/DL (ref 65–99)
HBA1C MFR BLD: 5.5 % (ref 4.8–5.6)
HCT VFR BLD AUTO: 46.4 % (ref 37.5–51)
HGB BLD-MCNC: 15.3 G/DL (ref 13–17.7)
MCH RBC QN AUTO: 30.2 PG (ref 26.6–33)
MCHC RBC AUTO-ENTMCNC: 33 G/DL (ref 31.5–35.7)
MCV RBC AUTO: 91.5 FL (ref 79–97)
PLATELET # BLD AUTO: 238 10*3/MM3 (ref 140–450)
PMV BLD AUTO: 9.2 FL (ref 6–12)
POTASSIUM SERPL-SCNC: 4.4 MMOL/L (ref 3.5–5.2)
PROT SERPL-MCNC: 6.3 G/DL (ref 6–8.5)
RBC # BLD AUTO: 5.07 10*6/MM3 (ref 4.14–5.8)
SODIUM SERPL-SCNC: 141 MMOL/L (ref 136–145)
TSH SERPL DL<=0.05 MIU/L-ACNC: 1.37 UIU/ML (ref 0.27–4.2)
WBC NRBC COR # BLD AUTO: 7.5 10*3/MM3 (ref 3.4–10.8)

## 2024-05-20 PROCEDURE — 93000 ELECTROCARDIOGRAM COMPLETE: CPT | Performed by: INTERNAL MEDICINE

## 2024-05-20 PROCEDURE — 99214 OFFICE O/P EST MOD 30 MIN: CPT | Performed by: INTERNAL MEDICINE

## 2024-05-20 PROCEDURE — 36415 COLL VENOUS BLD VENIPUNCTURE: CPT

## 2024-05-20 PROCEDURE — 80050 GENERAL HEALTH PANEL: CPT

## 2024-05-20 PROCEDURE — 83036 HEMOGLOBIN GLYCOSYLATED A1C: CPT

## 2024-05-20 RX ORDER — ZINC SULFATE 50(220)MG
220 CAPSULE ORAL DAILY
COMMUNITY

## 2024-05-20 NOTE — PROGRESS NOTES
"    Subjective:     Encounter Date: 05/20/24        Patient ID: SIMON Gomez Jr. is a 64 y.o. male.    Chief Complaint: CAD    This is a 64 year old male with history of CAD, HTN, HLD, & hypothyroidism who came into the hospital in December 2018 for chest pain. EKG ruled him in for STEMI and he underwent cardiac catheterization which found occluded circumflex. He received a 3x15mm Xience Cindy REMA to the proximal circumflex . He also had severe mid LAD stenosis, which was intervened on in early 2019 with 3 overlapping drug eluting stents to the proximal-mid LAD.  LV function is preserved.     He is doing well.  He has new fatigue, no energy, feels unrefreshed when waking. 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 No angina or dyspnea.   Brief palps lasting less than 10 seconds.     REVIEW OF SYSTEMS:   All systems reviewed.  Pertinent positives identified in HPI.  All other systems are negative.    The following portions of the patient's history were reviewed and updated as appropriate: allergies, current medications, past family history, past medical history, past social history, past surgical history and problem list.    Past Medical History:   Diagnosis Date    A-fib     Abnormal heart rhythm     HISTORY OF OF (PT STATES EP STUDY BY DR BENNETT YEARS AGO), FOLLOWED BY DR GALEANO CURRENTLY    Anesthesia complication     PT STATES BECAME \"HYPOXIC UNDER GAS\" YEARS AGO     Colorblind     Coronary artery disease involving native coronary artery of native heart with angina pectoris     Ex-smoker     History of COVID-19 01/2021    BILAT PNEUMONIA     History of GI bleed     Hyperlipidemia LDL goal <70 01/02/2019    Hypertension     Hypogonadism in male 10/21/2016    Hypothyroidism     Pneumonia due to COVID-19 virus     ST elevation myocardial infarction (STEMI) 12/26/2018    Tear of meniscus of knee     Testosterone deficiency  "       Family History   Problem Relation Age of Onset    Arthritis Mother     No Known Problems Father     No Known Problems Brother     No Known Problems Daughter     No Known Problems Brother     Malig Hyperthermia Neg Hx        Social History     Socioeconomic History    Marital status:    Tobacco Use    Smoking status: Former     Types: Cigars    Smokeless tobacco: Never    Tobacco comments:     SOCIAL CIGAR SMOKING ONLY QUIT 2009   Vaping Use    Vaping status: Never Used   Substance and Sexual Activity    Alcohol use: Yes     Comment: Rarely    Drug use: No    Sexual activity: Defer     Partners: Female       Allergies   Allergen Reactions    Meperidine Anaphylaxis     Per patient's wife, he stopped breathing    Hydrocodone-Acetaminophen Hallucinations    Oxycodone-Acetaminophen Hallucinations       Past Surgical History:   Procedure Laterality Date    ADENOIDECTOMY      ANGIOPLASTY      STENT PLACEMENT X 5 (2018,2019)     BACK SURGERY      L5-S1    CARDIAC CATHETERIZATION N/A 12/26/2018    Procedure: Left Heart Cath;  Surgeon: Анна Booker MD;  Location:  [facility] CATH INVASIVE LOCATION;  Service: Cardiovascular    CARDIAC CATHETERIZATION N/A 12/26/2018    Procedure: Coronary angiography;  Surgeon: Анна Booker MD;  Location:  [facility] CATH INVASIVE LOCATION;  Service: Cardiovascular    CARDIAC CATHETERIZATION N/A 12/26/2018    Procedure: Stent REMA coronary;  Surgeon: Анна Booker MD;  Location:  [facility] CATH INVASIVE LOCATION;  Service: Cardiovascular    CARDIAC CATHETERIZATION  12/26/2018    Procedure: Percutaneous Mechanical Thrombectomy;  Surgeon: Анна Booker MD;  Location:  [facility] CATH INVASIVE LOCATION;  Service: Cardiovascular    CARDIAC CATHETERIZATION N/A 02/13/2019    Procedure: Coronary angiography;  Surgeon: Анна Booker MD;  Location:  [facility] CATH INVASIVE LOCATION;  Service: Cardiology    CARDIAC CATHETERIZATION N/A 02/13/2019    Procedure: Stent REMA coronary;  Surgeon: Анна Booker MD;   Location:  [facility] CATH INVASIVE LOCATION;  Service: Cardiology    CARDIAC CATHETERIZATION N/A 02/13/2019    Procedure: Left Heart Cath;  Surgeon: Анна Booker MD;  Location:  [facility] CATH INVASIVE LOCATION;  Service: Cardiology    CARDIAC CATHETERIZATION N/A 02/13/2019    Procedure: Left ventriculography;  Surgeon: Анна Booker MD;  Location:  [facility] CATH INVASIVE LOCATION;  Service: Cardiology    CARDIAC CATHETERIZATION  2/13/2019    CARDIAC ELECTROPHYSIOLOGY STUDY AND ABLATION      CATARACT EXTRACTION Right 01/04/2023    CATARACT EXTRACTION Left 01/11/2023    CATARACT EXTRACTION, BILATERAL      COLONOSCOPY      COLONOSCOPY N/A 06/26/2017    Procedure: COLONOSCOPY AT BEDSIDE;  Surgeon: Fadi Gallagher MD;  Location:  [facility] ENDOSCOPY;  Service:     EXTERNAL EAR SURGERY      HAND SURGERY Left     INGUINAL HERNIA REPAIR      X3    KNEE ARTHROSCOPY Left 01/19/2022    Procedure: KNEE ARTHROSCOPY, PARTIAL MEDIAL MENISECTOMY, DEBRIDEMENT OF ARTHRITIS, REMOVAL OF LOSE BODIES;  Surgeon: Destiney Tucker MD;  Location:  [facility];  Service: Orthopedics;  Laterality: Left;    MANDIBLE SURGERY      WIRED X 2  - HIGH SCHOOL    NOSE SURGERY      VA RT/LT HEART CATHETERS N/A 12/26/2018    Procedure: Percutaneous Coronary Intervention;  Surgeon: Анна Booker MD;  Location:  [facility] CATH INVASIVE LOCATION;  Service: Cardiovascular    ROTATOR CUFF REPAIR Right     SHOULDER ARTHROSCOPY Right     SHOULDER ARTHROSCOPY Left     THYROIDECTOMY      TONSILLECTOMY               ECG 12 Lead    Date/Time: 5/20/2024 10:47 AM  Performed by: Анна Booker MD    Authorized by: Анна Booker MD  Comparison: compared with previous ECG from 11/15/2023  Similar to previous ECG  Rhythm: sinus rhythm  Rate: normal  Conduction: conduction normal  Q waves: III and aVF    QRS axis: left  Other findings: non-specific ST-T wave changes    Clinical impression: non-specific ECG           Objective:     Physical Exam     GEN: no distress, alert and  oriented, obese  Lungs CTAB, no rales or wheezes  Chest: no abnormalities  Heart: RRR, no murmurs  Abdo: soft,  Nontender, nondistended  Extr: no edema, +2 DP and 2+ carotid pulses b/l  Skin: no rash or bruising  Psych: organized thought, normal behavior and affect        Assessment:          Diagnosis Plan   1. Coronary artery disease of native artery of native heart with stable angina pectoris  Treadmill Stress Test    CBC (No Diff)    Comprehensive Metabolic Panel    TSH Rfx On Abnormal To Free T4    Hemoglobin A1c    TSH Rfx On Abnormal To Free T4               Plan:       1. CAD:  Lateral STEMI December 2018 with REMA to proximal circumflex, followed by staged proximal LAD intervention using 3 overlapping REMA   Continue Plavix.  Stop aspirin.  2. HTN:   Blood pressure is well controlled at home  3. HLD:  Atorva 80, at goal  4. Hypothyroidism  5.  Obesity: Discussed in detail.  Goal to get close to 220 pounds  6. Fatigue: Labs, treadmill. Will get back to the gym soon. If no improvement will need a sleep study      Clarissa, thank you referring this kind patient to me.  Please call with any questions or concerns.  I will see him again in 6 months.       Анна Booker MD  05/20/24    Outpatient Encounter Medications as of 5/20/2024   Medication Sig Dispense Refill    atorvastatin (LIPITOR) 80 MG tablet TAKE 1 TABLET BY MOUTH ONCE DAILY AT NIGHT 90 tablet 3    azithromycin (ZITHROMAX) 250 MG tablet 2 tablets by mouth day 1, then 1 tablet by mouth days 2-5. 6 tablet 0    Cholecalciferol (VITAMIN D PO) Take 10,000 Units by mouth Every Night.      clopidogrel (PLAVIX) 75 MG tablet Take 1 tablet by mouth once daily 90 tablet 0    Cyanocobalamin (VITAMIN B 12 PO) Take 1,000 mcg by mouth Every Night.      levothyroxine (SYNTHROID, LEVOTHROID) 150 MCG tablet Take 1 tablet by mouth once daily 90 tablet 0    losartan (COZAAR) 50 MG tablet TAKE 1 TABLET BY MOUTH ONCE DAILY AT NIGHT 90 tablet 0    metoprolol succinate XL  (TOPROL-XL) 25 MG 24 hr tablet TAKE 1/2 (ONE-HALF) TABLET BY MOUTH ONCE DAILY AT NIGHT 45 tablet 0    tadalafil (Cialis) 20 MG tablet Take 1 tablet by mouth Daily As Needed for Erectile Dysfunction. 30 tablet 0    vitamin C (ASCORBIC ACID) 250 MG tablet Take 1 tablet by mouth Every Night.      [DISCONTINUED] clopidogrel (PLAVIX) 75 MG tablet Take 1 tablet by mouth once daily 90 tablet 0    [DISCONTINUED] levothyroxine (SYNTHROID, LEVOTHROID) 150 MCG tablet Take 1 tablet by mouth once daily 90 tablet 0    [DISCONTINUED] losartan (COZAAR) 50 MG tablet TAKE 1 TABLET BY MOUTH ONCE DAILY AT NIGHT 90 tablet 0    [DISCONTINUED] metoprolol succinate XL (TOPROL-XL) 25 MG 24 hr tablet TAKE 1/2 (ONE-HALF) TABLET BY MOUTH ONCE DAILY AT NIGHT 45 tablet 0     No facility-administered encounter medications on file as of 5/20/2024.

## 2024-05-30 ENCOUNTER — TELEPHONE (OUTPATIENT)
Dept: CARDIOLOGY | Facility: CLINIC | Age: 64
End: 2024-05-30
Payer: COMMERCIAL

## 2024-05-31 ENCOUNTER — HOSPITAL ENCOUNTER (OUTPATIENT)
Dept: CARDIOLOGY | Facility: HOSPITAL | Age: 64
Discharge: HOME | End: 2024-05-31
Payer: COMMERCIAL

## 2024-05-31 DIAGNOSIS — I25.118 CORONARY ARTERY DISEASE OF NATIVE ARTERY OF NATIVE HEART WITH STABLE ANGINA PECTORIS: ICD-10-CM

## 2024-05-31 LAB
BH CV STRESS BP STAGE 1: NORMAL
BH CV STRESS BP STAGE 2: NORMAL
BH CV STRESS DURATION MIN STAGE 1: 3
BH CV STRESS DURATION MIN STAGE 2: 3
BH CV STRESS DURATION MIN STAGE 3: 0
BH CV STRESS DURATION SEC STAGE 1: 0
BH CV STRESS DURATION SEC STAGE 2: 0
BH CV STRESS DURATION SEC STAGE 3: 6
BH CV STRESS GRADE STAGE 1: 10
BH CV STRESS GRADE STAGE 2: 12
BH CV STRESS GRADE STAGE 3: 14
BH CV STRESS HR STAGE 1: 110
BH CV STRESS HR STAGE 2: 128
BH CV STRESS HR STAGE 3: 128
BH CV STRESS METS STAGE 1: 5
BH CV STRESS METS STAGE 2: 7.5
BH CV STRESS METS STAGE 3: 7.5
BH CV STRESS PROTOCOL 1: NORMAL
BH CV STRESS RECOVERY BP: NORMAL MMHG
BH CV STRESS RECOVERY HR: 90 BPM
BH CV STRESS SPEED STAGE 1: 1.7
BH CV STRESS SPEED STAGE 2: 2.5
BH CV STRESS SPEED STAGE 3: 3.4
BH CV STRESS STAGE 1: 1
BH CV STRESS STAGE 2: 2
BH CV STRESS STAGE 3: 3
MAXIMAL PREDICTED HEART RATE: 157 BPM
PERCENT MAX PREDICTED HR: 81.53 %
STRESS BASELINE BP: NORMAL MMHG
STRESS BASELINE HR: 87 BPM
STRESS PERCENT HR: 96 %
STRESS POST ESTIMATED WORKLOAD: 7.5 METS
STRESS POST EXERCISE DUR MIN: 6 MIN
STRESS POST EXERCISE DUR SEC: 6 SEC
STRESS POST PEAK BP: NORMAL MMHG
STRESS POST PEAK HR: 128 BPM
STRESS TARGET HR: 133 BPM

## 2024-05-31 PROCEDURE — 93017 CV STRESS TEST TRACING ONLY: CPT

## 2024-06-20 RX ORDER — ATORVASTATIN CALCIUM 80 MG/1
TABLET, FILM COATED ORAL
Qty: 90 TABLET | Refills: 0 | Status: SHIPPED | OUTPATIENT
Start: 2024-06-20

## 2024-07-01 RX ORDER — LOSARTAN POTASSIUM 50 MG/1
50 TABLET ORAL NIGHTLY
Qty: 90 TABLET | Refills: 0 | Status: SHIPPED | OUTPATIENT
Start: 2024-07-01

## 2024-08-13 RX ORDER — METOPROLOL SUCCINATE 25 MG/1
12.5 TABLET, EXTENDED RELEASE ORAL NIGHTLY
Qty: 45 TABLET | Refills: 0 | Status: SHIPPED | OUTPATIENT
Start: 2024-08-13

## 2024-09-10 RX ORDER — LEVOTHYROXINE SODIUM 150 UG/1
150 TABLET ORAL DAILY
Qty: 90 TABLET | Refills: 0 | Status: CANCELLED | OUTPATIENT
Start: 2024-09-10

## 2024-09-12 ENCOUNTER — TELEPHONE (OUTPATIENT)
Dept: FAMILY MEDICINE CLINIC | Facility: CLINIC | Age: 64
End: 2024-09-12
Payer: COMMERCIAL

## 2024-09-12 RX ORDER — LEVOTHYROXINE SODIUM 150 UG/1
TABLET ORAL
Qty: 90 TABLET | Refills: 0 | OUTPATIENT
Start: 2024-09-12

## 2024-09-12 RX ORDER — ATORVASTATIN CALCIUM 80 MG/1
TABLET, FILM COATED ORAL
Qty: 90 TABLET | Refills: 0 | Status: SHIPPED | OUTPATIENT
Start: 2024-09-12

## 2024-09-12 RX ORDER — LEVOTHYROXINE SODIUM 150 UG/1
150 TABLET ORAL DAILY
Qty: 90 TABLET | Refills: 0 | Status: SHIPPED | OUTPATIENT
Start: 2024-09-12

## 2024-09-12 NOTE — TELEPHONE ENCOUNTER
Patient adv he is out of req meds - due to his job he can come in any sooner than 9/17 - wants to know if we can call in a partial Rx to hold him over until then - if not could we maybe do a virtual apt

## 2024-09-16 RX ORDER — CLOPIDOGREL BISULFATE 75 MG/1
75 TABLET ORAL DAILY
Qty: 90 TABLET | Refills: 3 | Status: SHIPPED | OUTPATIENT
Start: 2024-09-16

## 2024-09-17 ENCOUNTER — OFFICE VISIT (OUTPATIENT)
Dept: FAMILY MEDICINE CLINIC | Facility: CLINIC | Age: 64
End: 2024-09-17
Payer: COMMERCIAL

## 2024-09-17 VITALS
HEIGHT: 74 IN | HEART RATE: 66 BPM | WEIGHT: 258.4 LBS | DIASTOLIC BLOOD PRESSURE: 88 MMHG | BODY MASS INDEX: 33.16 KG/M2 | OXYGEN SATURATION: 96 % | SYSTOLIC BLOOD PRESSURE: 128 MMHG

## 2024-09-17 DIAGNOSIS — E78.5 HYPERLIPIDEMIA LDL GOAL <70: ICD-10-CM

## 2024-09-17 DIAGNOSIS — Z12.5 SCREENING FOR PROSTATE CANCER: ICD-10-CM

## 2024-09-17 DIAGNOSIS — Z00.00 ANNUAL PHYSICAL EXAM: Primary | ICD-10-CM

## 2024-09-17 DIAGNOSIS — I10 PRIMARY HYPERTENSION: ICD-10-CM

## 2024-09-17 PROCEDURE — 99396 PREV VISIT EST AGE 40-64: CPT | Performed by: NURSE PRACTITIONER

## 2024-09-23 LAB
ALBUMIN SERPL-MCNC: 4.4 G/DL (ref 3.5–5.2)
ALBUMIN/GLOB SERPL: 1.9 G/DL
ALP SERPL-CCNC: 116 U/L (ref 39–117)
ALT SERPL-CCNC: 22 U/L (ref 1–41)
AST SERPL-CCNC: 18 U/L (ref 1–40)
BILIRUB SERPL-MCNC: 0.7 MG/DL (ref 0–1.2)
BUN SERPL-MCNC: 12 MG/DL (ref 8–23)
BUN/CREAT SERPL: 13 (ref 7–25)
CALCIUM SERPL-MCNC: 9.3 MG/DL (ref 8.6–10.5)
CHLORIDE SERPL-SCNC: 104 MMOL/L (ref 98–107)
CHOLEST SERPL-MCNC: 128 MG/DL (ref 0–200)
CO2 SERPL-SCNC: 21.5 MMOL/L (ref 22–29)
CREAT SERPL-MCNC: 0.92 MG/DL (ref 0.76–1.27)
EGFRCR SERPLBLD CKD-EPI 2021: 93.5 ML/MIN/1.73
GLOBULIN SER CALC-MCNC: 2.3 GM/DL
GLUCOSE SERPL-MCNC: 87 MG/DL (ref 65–99)
HDLC SERPL-MCNC: 40 MG/DL (ref 40–60)
LDLC SERPL CALC-MCNC: 66 MG/DL (ref 0–100)
LDLC/HDLC SERPL: 1.59 {RATIO}
POTASSIUM SERPL-SCNC: 4 MMOL/L (ref 3.5–5.2)
PROT SERPL-MCNC: 6.7 G/DL (ref 6–8.5)
PSA SERPL-MCNC: 3.05 NG/ML (ref 0–4)
SODIUM SERPL-SCNC: 139 MMOL/L (ref 136–145)
TESTOST FREE SERPL-MCNC: 5.2 PG/ML (ref 6.6–18.1)
TESTOST SERPL-MCNC: 535.6 NG/DL (ref 264–916)
TRIGL SERPL-MCNC: 123 MG/DL (ref 0–150)
VLDLC SERPL CALC-MCNC: 22 MG/DL (ref 5–40)

## 2024-09-23 RX ORDER — LOSARTAN POTASSIUM 50 MG/1
50 TABLET ORAL NIGHTLY
Qty: 90 TABLET | Refills: 0 | Status: SHIPPED | OUTPATIENT
Start: 2024-09-23

## 2024-11-05 RX ORDER — METOPROLOL SUCCINATE 25 MG/1
12.5 TABLET, EXTENDED RELEASE ORAL NIGHTLY
Qty: 45 TABLET | Refills: 0 | Status: SHIPPED | OUTPATIENT
Start: 2024-11-05

## 2024-12-09 RX ORDER — LEVOTHYROXINE SODIUM 150 UG/1
150 TABLET ORAL DAILY
Qty: 90 TABLET | Refills: 0 | Status: SHIPPED | OUTPATIENT
Start: 2024-12-09

## 2024-12-16 RX ORDER — ATORVASTATIN CALCIUM 80 MG/1
TABLET, FILM COATED ORAL
Qty: 90 TABLET | Refills: 0 | Status: SHIPPED | OUTPATIENT
Start: 2024-12-16

## 2024-12-17 RX ORDER — LOSARTAN POTASSIUM 50 MG/1
50 TABLET ORAL NIGHTLY
Qty: 90 TABLET | Refills: 0 | Status: SHIPPED | OUTPATIENT
Start: 2024-12-17

## 2024-12-17 NOTE — TELEPHONE ENCOUNTER
Protocol Failed.     NOV - No future appointment scheduled for the patient.     LOV - 5/20/2024 ()        Plan:      Plan  1. CAD:  Lateral STEMI December 2018 with REMA to proximal circumflex, followed by staged proximal LAD intervention using 3 overlapping REMA   Continue Plavix.  Stop aspirin.  2. HTN:   Blood pressure is well controlled at home  3. HLD:  Atorva 80, at goal  4. Hypothyroidism  5.  Obesity: Discussed in detail.  Goal to get close to 220 pounds  6. Fatigue: Labs, treadmill. Will get back to the gym soon. If no improvement will need a sleep study        Clarissa, thank you referring this kind patient to me.  Please call with any questions or concerns.  I will see him again in 6 months.

## 2025-01-29 RX ORDER — METOPROLOL SUCCINATE 25 MG/1
12.5 TABLET, EXTENDED RELEASE ORAL NIGHTLY
Qty: 45 TABLET | Refills: 0 | Status: SHIPPED | OUTPATIENT
Start: 2025-01-29

## 2025-02-11 NOTE — PROGRESS NOTES
New Knee      Patient: SIMON Gomez Jr.        YOB: 1960    Medical Record Number: [id number]        Chief Complaints: Right knee pain      History of Present Illness: This is a 64-year-old  who presents complaining of right knee pain he fell January 8 twice 1 walking into work where the ice was not cleared and the second walking out work still was not cleared.  He said severe pain and swelling since that time he has locking and catching sometimes difficulty bearing weight.  He is status post left knee arthroscopy previously and did well with that        Allergies:   Allergies   Allergen Reactions    Meperidine Anaphylaxis     Per patient's wife, he stopped breathing    Hydrocodone-Acetaminophen Hallucinations    Oxycodone-Acetaminophen Hallucinations       Medications:   Home Medications:  Current Outpatient Medications on File Prior to Visit   Medication Sig    anastrozole (ARIMIDEX) 1 MG tablet Take  by mouth Daily.    atorvastatin (LIPITOR) 80 MG tablet TAKE 1 TABLET BY MOUTH ONCE DAILY AT NIGHT    Cholecalciferol (VITAMIN D PO) Take 10,000 Units by mouth Every Night.    clopidogrel (PLAVIX) 75 MG tablet Take 1 tablet by mouth once daily    Cyanocobalamin (VITAMIN B 12 PO) Take 1,000 mcg by mouth Every Night.    levothyroxine (SYNTHROID, LEVOTHROID) 150 MCG tablet Take 1 tablet by mouth once daily    losartan (COZAAR) 50 MG tablet TAKE 1 TABLET BY MOUTH ONCE DAILY AT NIGHT    metoprolol succinate XL (TOPROL-XL) 25 MG 24 hr tablet TAKE 1/2 (ONE-HALF) TABLET BY MOUTH ONCE DAILY AT NIGHT    tadalafil (Cialis) 20 MG tablet Take 1 tablet by mouth Daily As Needed for Erectile Dysfunction.    vitamin C (ASCORBIC ACID) 250 MG tablet Take 1 tablet by mouth Every Night.    zinc sulfate (ZINCATE) 220 (50 Zn) MG capsule Take 1 capsule by mouth Daily.    azithromycin (Zithromax Z-Venkatesh) 250 MG tablet Take 2 tablets by mouth on day 1, then 1 tablet daily on days 2-5     No current facility-administered  "medications on file prior to visit.     Current Medications:  Scheduled Meds:  Continuous Infusions:No current facility-administered medications for this visit.    PRN Meds:.    Past Medical History:   Diagnosis Date    A-fib     Abnormal heart rhythm     HISTORY OF OF (PT STATES EP STUDY BY DR BENNETT YEARS AGO), FOLLOWED BY DR GALEANO CURRENTLY    Anesthesia complication     PT STATES BECAME \"HYPOXIC UNDER GAS\" YEARS AGO     Colorblind     Coronary artery disease involving native coronary artery of native heart with angina pectoris     Ex-smoker     History of COVID-19 01/2021    BILAT PNEUMONIA     History of GI bleed     Hyperlipidemia LDL goal <70 01/02/2019    Hypertension     Hypogonadism in male 10/21/2016    Hypothyroidism     Pneumonia due to COVID-19 virus     ST elevation myocardial infarction (STEMI) 12/26/2018    Tear of meniscus of knee     Testosterone deficiency         Past Surgical History:   Procedure Laterality Date    ADENOIDECTOMY      ANGIOPLASTY      STENT PLACEMENT X 5 (2018,2019)     BACK SURGERY      L5-S1    CARDIAC CATHETERIZATION N/A 12/26/2018    Procedure: Left Heart Cath;  Surgeon: Анна Galeano MD;  Location: [facility] CATH [facility]VASIVE LOC[facility];  Service: Cardiovascular    CARDIAC CATHETERIZATION N/A 12/26/2018    Procedure: Coronary angiography;  Surgeon: Анна Galeano MD;  Location:  [facility] CATH INVASIVE LOCATION;  Service: Cardiovascular    CARDIAC CATHETERIZATION N/A 12/26/2018    Procedure: Stent REMA coronary;  Surgeon: Анна Galeano MD;  Location:  [facility] CATH INVASIVE LOCATION;  Service: Cardiovascular    CARDIAC CATHETERIZATION  12/26/2018    Procedure: Percutaneous Mechanical Thrombectomy;  Surgeon: Анна Galeano MD;  Location: [facility] CATH [facility]VAS[facility] LOC[facility];  Service: Cardiovascular    CARDIAC CATHETERIZATION N/A 02/13/2019    Procedure: Coronary angiography;  Surgeon: Анна Galeano MD;  Location: [facility] CATH [facility]ASIVE LOC[facility];  Service: Cardiology    CARDIAC CATHETERIZATION N/A " 02/13/2019    Procedure: Stent REMA coronary;  Surgeon: Анна Booker MD;  [facility];  Service: Cardiology    CARDIAC CATHETERIZATION N/A 02/13/2019    Procedure: Left Heart Cath;  Surgeon: Анна Booker MD;  [facility];  Service: Cardiology    CARDIAC CATHETERIZATION N/A 02/13/2019    Procedure: Left ventriculography;  Surgeon: Анна Booker MD;  Location: [facility] CATH INVASIVE LOCATION;  Service: Cardiology    CARDIAC CATHETERIZATION  2/13/2019    CARDIAC ELECTROPHYSIOLOGY STUDY AND ABLATION      CATARACT EXTRACTION Right 01/04/2023    CATARACT EXTRACTION Left 01/11/2023    CATARACT EXTRACTION, BILATERAL      COLONOSCOPY      COLONOSCOPY N/A 06/26/2017    Procedure: COLONOSCOPY AT BEDSIDE;  Surgeon: Fadi Gallagher MD;  Location: [facility] ENDOSCOPY;  Service:     EXTERNAL EAR SURGERY      HAND SURGERY Left     INGUINAL HERNIA REPAIR      X3    KNEE ARTHROSCOPY Left 01/19/2022    Procedure: KNEE ARTHROSCOPY, PARTIAL MEDIAL MENISECTOMY, DEBRIDEMENT OF ARTHRITIS, REMOVAL OF LOSE BODIES;  Surgeon: Destiney Tucker MD;  Location: [facility];  Service: Orthopedics;  Laterality: Left;    KNEE SURGERY      MANDIBLE SURGERY      WIRED X 2  - HIGH SCHOOL    NOSE SURGERY      KS RT/LT HEART CATHETERS N/A 12/26/2018    Procedure: Percutaneous Coronary Intervention;  Surgeon: Анна Booker MD;  Location: [facility] CATH INVASIVE LOCATION;  Service: Cardiovascular    ROTATOR CUFF REPAIR Right     SHOULDER ARTHROSCOPY Right     SHOULDER ARTHROSCOPY Left     SHOULDER SURGERY      THYROIDECTOMY      TONSILLECTOMY          Social History     Occupational History    Not on file   Tobacco Use    Smoking status: Former     Current packs/day: 0.00     Types: Cigars, Cigarettes    Smokeless tobacco: Never    Tobacco comments:     SOCIAL CIGAR SMOKING ONLY QUIT 2009   Vaping Use    Vaping status: Never Used   Substance and Sexual Activity    Alcohol use: Yes     Comment: Rarely  "   Drug use: No    Sexual activity: Defer     Partners: Female      Social History     Social History Narrative    Works at [employer]            Family History   Problem Relation Age of Onset    Arthritis Mother     No Known Problems Father     No Known Problems Brother     No Known Problems Daughter     No Known Problems Brother     Malstephy Hyperthermia Neg Hx              Review of Systems:     Review of Systems      Physical Exam: 64 y.o. male  General Appearance:    Alert, cooperative, in no acute distress                   Vitals:    02/13/25 1005   Temp: 98.6 °F (37 °C)   TempSrc: Temporal   Weight: 118 kg (260 lb 6.4 oz)   Height: 188 cm (74\")   PainSc:   8   PainLoc: Knee      Patient is alert and read ×3 no acute distress appears her above-listed at height weight and age.  Affect is normal respiratory rate is normal unlabored. Heart rate regular rate rhythm, sclera, dentition and hearing are normal for the purpose of this exam.  He has a steppage gait      Ortho Exam  Physical exam of the right  knee reveals no effusion no redness.  The patient does have tenderness about the medial l joint line.  No tenderness about the lateral l joint line.  A negative bounce home and a positive l medial Marquise.    Patient has a stable ligamentous exam.  The patient has a negative Lachman and negative anterior drawer and a negative pivot shift.  Quads are reasonable and symmetric bilaterally.  Calf is soft and nontender.  There is no overlying skin changes no lymphedema lymphadenopathy.  Patient has good hip range of motion full symmetric and asymptomatic and a normal ankle exam.  She has good distal pulses and sensation distally is intact  He does have a foot drop on the right from a prior nerve injury  Procedures             Radiology:   AP, Lateral and merchant views of the right knee  were ordered/reviewed to evauateknee pain.  I have compared to previous films he has some narrowing of his " medial compartment on the left on the right he has good maintenance with joint space medially and laterally some mild to moderate patellofemoral OA nothing that looks acute no fracture  Imaging Results (Most Recent)       Procedure Component Value Units Date/Time    XR Knee 3 View Right [[id number]] Resulted: 02/13/25 0956     Updated: 02/13/25 0956    Impression:      Ordering physician's impression is located in the Encounter Note dated 02/13/25. X-ray performed in the DR room.            Assessment/Plan:        Right knee pain I suspect this is a meniscus tear with a flap less likely fracture he has been doing the exercises that he knew how to do from his left knee plan is to proceed with an MRI I will allow him to work with sitting work only

## 2025-02-13 ENCOUNTER — OFFICE VISIT (OUTPATIENT)
Dept: ORTHOPEDIC SURGERY | Facility: CLINIC | Age: 65
End: 2025-02-13
Payer: COMMERCIAL

## 2025-02-13 VITALS — BODY MASS INDEX: 33.42 KG/M2 | HEIGHT: 74 IN | WEIGHT: 260.4 LBS | TEMPERATURE: 98.6 F

## 2025-02-13 DIAGNOSIS — S83.241A TEAR OF MEDIAL MENISCUS OF RIGHT KNEE, CURRENT, UNSPECIFIED TEAR TYPE, INITIAL ENCOUNTER: ICD-10-CM

## 2025-02-13 DIAGNOSIS — M25.561 RIGHT KNEE PAIN, UNSPECIFIED CHRONICITY: Primary | ICD-10-CM

## 2025-02-13 RX ORDER — ANASTROZOLE 1 MG/1
TABLET ORAL DAILY
COMMUNITY

## 2025-02-13 NOTE — LETTER
February 13, 2025     Patient: SIMON Gomez .   YOB: 1960   Date of Visit: 2/13/2025       To Whom It May Concern:    It is my medical opinion that SIMON Gomez can continue to work, but I want sitting work only at this time.           Sincerely,        Destiney Tucker MD    CC: No Recipients

## 2025-02-14 ENCOUNTER — TELEPHONE (OUTPATIENT)
Dept: ORTHOPEDIC SURGERY | Facility: CLINIC | Age: 65
End: 2025-02-14
Payer: COMMERCIAL

## 2025-02-14 NOTE — TELEPHONE ENCOUNTER
"Caller: SIMON Gomez Jr. \"KASEY\"    Relationship to patient: Self    Best call back number: [phone]*    Patient is needing: PT IS CALLING TO GIVE THE WORKERS COMP INFO..    CLAIM NUMBER - [id number]   - ULI QUACH ([phone])  SEND RECORDS TO NIRMAL SANCHEZ - ([phone]) - EXT - 221 ([email or web address])        "

## 2025-02-14 NOTE — TELEPHONE ENCOUNTER
I filled out the rest of the form he started when he was in office. I will enter his WC info into his chart.

## 2025-02-21 ENCOUNTER — PREP FOR SURGERY (OUTPATIENT)
Dept: OTHER | Facility: HOSPITAL | Age: 65
End: 2025-02-21
Payer: COMMERCIAL

## 2025-02-21 DIAGNOSIS — G89.29 CHRONIC PAIN OF RIGHT KNEE: Primary | ICD-10-CM

## 2025-02-21 DIAGNOSIS — M25.561 CHRONIC PAIN OF RIGHT KNEE: Primary | ICD-10-CM

## 2025-02-26 ENCOUNTER — TELEPHONE (OUTPATIENT)
Dept: ORTHOPEDIC SURGERY | Facility: CLINIC | Age: 65
End: 2025-02-26
Payer: COMMERCIAL

## 2025-02-28 ENCOUNTER — PRE-ADMISSION TESTING (OUTPATIENT)
Dept: PREADMISSION TESTING | Facility: HOSPITAL | Age: 65
End: 2025-02-28
Payer: OTHER MISCELLANEOUS

## 2025-02-28 VITALS
TEMPERATURE: 98.2 F | RESPIRATION RATE: 16 BRPM | HEIGHT: 72 IN | DIASTOLIC BLOOD PRESSURE: 92 MMHG | OXYGEN SATURATION: 96 % | BODY MASS INDEX: 34.66 KG/M2 | WEIGHT: 255.9 LBS | HEART RATE: 80 BPM | SYSTOLIC BLOOD PRESSURE: 151 MMHG

## 2025-02-28 LAB
ANION GAP SERPL CALCULATED.3IONS-SCNC: 11.6 MMOL/L (ref 5–15)
BUN SERPL-MCNC: 15 MG/DL (ref 8–23)
BUN/CREAT SERPL: 13 (ref 7–25)
CALCIUM SPEC-SCNC: 8.7 MG/DL (ref 8.6–10.5)
CHLORIDE SERPL-SCNC: 107 MMOL/L (ref 98–107)
CO2 SERPL-SCNC: 22.4 MMOL/L (ref 22–29)
CREAT SERPL-MCNC: 1.15 MG/DL (ref 0.76–1.27)
DEPRECATED RDW RBC AUTO: 44.6 FL (ref 37–54)
EGFRCR SERPLBLD CKD-EPI 2021: 71.5 ML/MIN/1.73
ERYTHROCYTE [DISTWIDTH] IN BLOOD BY AUTOMATED COUNT: 13.1 % (ref 12.3–15.4)
GLUCOSE SERPL-MCNC: 132 MG/DL (ref 65–99)
HCT VFR BLD AUTO: 49.1 % (ref 37.5–51)
HGB BLD-MCNC: 16.1 G/DL (ref 13–17.7)
MCH RBC QN AUTO: 30.3 PG (ref 26.6–33)
MCHC RBC AUTO-ENTMCNC: 32.8 G/DL (ref 31.5–35.7)
MCV RBC AUTO: 92.3 FL (ref 79–97)
PLATELET # BLD AUTO: 266 10*3/MM3 (ref 140–450)
PMV BLD AUTO: 9.2 FL (ref 6–12)
POTASSIUM SERPL-SCNC: 3.8 MMOL/L (ref 3.5–5.2)
QT INTERVAL: 384 MS
QTC INTERVAL: 423 MS
RBC # BLD AUTO: 5.32 10*6/MM3 (ref 4.14–5.8)
SODIUM SERPL-SCNC: 141 MMOL/L (ref 136–145)
WBC NRBC COR # BLD AUTO: 9.78 10*3/MM3 (ref 3.4–10.8)

## 2025-02-28 PROCEDURE — 85027 COMPLETE CBC AUTOMATED: CPT

## 2025-02-28 PROCEDURE — 93010 ELECTROCARDIOGRAM REPORT: CPT | Performed by: INTERNAL MEDICINE

## 2025-02-28 PROCEDURE — 80048 BASIC METABOLIC PNL TOTAL CA: CPT

## 2025-02-28 PROCEDURE — 93005 ELECTROCARDIOGRAM TRACING: CPT

## 2025-02-28 PROCEDURE — 36415 COLL VENOUS BLD VENIPUNCTURE: CPT

## 2025-02-28 NOTE — DISCHARGE INSTRUCTIONS
Take the following medications the morning of surgery:    LEVOTHYROXINE    If you are on prescription narcotic pain medication to control your pain you may also take that medication the morning of surgery.      General Instructions:     Do not eat solid food after midnight the night before surgery.  Clear liquids day of surgery are allowed but must be stopped at least two hours before your hospital arrival time.       Allowed clear liquids      Water, sodas, and tea or coffee with no cream or milk added.       12 to 20 ounces of a clear liquid that contains carbohydrates is recommended.  If non-diabetic, have Gatorade or Powerade.  If diabetic, have G2 or Powerade Zero.     Do not have liquids red in color.  Do not consume chicken, beef, pork or vegetable broth or bouillon cubes of any variety as they are not considered clear liquids and are not allowed.      Infants may have breast milk up to four hours before surgery.  Infants drinking formula may drink formula up to six hours before surgery.   Patients who avoid smoking, chewing tobacco and alcohol for 4 weeks prior to surgery have a reduced risk of post-operative complications.  Quit smoking as many days before surgery as you can.  Do not smoke, use chewing tobacco or drink alcohol the day of surgery.   If applicable bring your C-PAP/ BI-PAP machine in with you to preop day of surgery.  Bring any papers given to you in the doctor’s office.  Wear clean comfortable clothes.  Do not wear contact lenses, false eyelashes or make-up.  Bring a case for your glasses.   Bring crutches or walker if applicable.  Remove all piercings.  Leave jewelry and any other valuables at home.  Hair extensions with metal clips must be removed prior to surgery.  The Pre-Admission Testing nurse will instruct you to bring medications if unable to obtain an accurate list in Pre-Admission Testing.    Day of surgery you will need to let the preoperative nurse know the last time you took each  of your medications.  To ensure a safe environment for patients and staff, we kindly ask that children under the age of 16 not accompany patients.  If you must bring a dependent child or dependent adult please ensure a responsible adult, other than yourself, is present to supervise them.      If you were given a blood bank ID arm band remember to bring it with you the day of surgery.    Preventing a Surgical Site Infection:  For 2 to 3 days before surgery, avoid shaving with a razor because the razor can irritate skin and make it easier to develop an infection.    Any areas of open skin can increase the risk of a post-operative wound infection by allowing bacteria to enter and travel throughout the body.  Notify your surgeon if you have any skin wounds / rashes even if it is not near the expected surgical site.  The area will need assessed to determine if surgery should be delayed until it is healed.  The night prior to surgery shower using a fresh bar of anti-bacterial soap (such as Dial) and clean washcloth.  Sleep in a clean bed with clean clothing.  Do not allow pets to sleep with you.  Shower on the morning of surgery using a fresh bar of anti-bacterial soap (such as Dial) and clean washcloth.  Dry with a clean towel and dress in clean clothing.  Ask your surgeon if you will be receiving antibiotics prior to surgery.  Make sure you, your family, and all healthcare providers clean their hands with soap and water or an alcohol based hand  before caring for you or your wound.    Day of surgery:  Your arrival time is approximately two hours before your scheduled surgery time.  Please note if you have an early arrival time the surgery doors do not open before 5:00 AM.  Upon arrival, a Pre-op nurse and Anesthesiologist will review your health history, obtain vital signs, and answer questions you may have.  The only belongings needed at this time will be a list of your home medications and if applicable your  C-PAP/BI-PAP machine.  A Pre-op nurse will start an IV and you may receive medication in preparation for surgery, including something to help you relax.     Please be aware that surgery does come with discomfort.  We want to make every effort to control your discomfort so please discuss any uncontrolled symptoms with your nurse.   Your doctor will most likely have prescribed pain medications.      If you are going home after surgery you will receive individualized written care instructions before being discharged.  A responsible adult must drive you to and from the hospital on the day of your surgery and ideally stay with you through the night.   .  Discharge prescriptions can be filled by the hospital pharmacy during regular pharmacy hours.  If you are having surgery late in the day/evening your prescription may be e-prescribed to your pharmacy.  Please verify your pharmacy hours or chose a 24 hour pharmacy to avoid not having access to your prescription because your pharmacy has closed for the day.    If you are staying overnight following surgery, you will be transported to your hospital room following the recovery period.  [facility] has all private rooms.    If you have any questions please call Pre-Admission Testing at [phone].  Deductibles and co-payments are collected on the day of service. Please be prepared to pay the required co-pay, deductible or deposit on the day of service as defined by your plan.    Call your surgeon immediately if you experience any of the following symptoms:  Sore Throat  Shortness of Breath or difficulty breathing  Cough  Chills  Body soreness or muscle pain  Headache  Fever  New loss of taste or smell  Do not arrive for your surgery ill.  Your procedure will need to be rescheduled to another time.  You will need to call your physician before the day of surgery to avoid any unnecessary exposure to hospital staff as well as other patients.

## 2025-03-04 RX ORDER — LEVOTHYROXINE SODIUM 150 UG/1
150 TABLET ORAL DAILY
Qty: 90 TABLET | Refills: 0 | Status: SHIPPED | OUTPATIENT
Start: 2025-03-04

## 2025-03-04 NOTE — TELEPHONE ENCOUNTER
Rx Refill Note  Requested Prescriptions     Pending Prescriptions Disp Refills    levothyroxine (SYNTHROID, LEVOTHROID) 150 MCG tablet [Pharmacy Med Name: Levothyroxine Sodium 150 MCG Oral Tablet] 90 tablet 0     Sig: Take 1 tablet by mouth once daily      Last office visit with prescribing clinician: 9/17/2024   Last telemedicine visit with prescribing clinician: Visit date not found   Next office visit with prescribing clinician: Visit date not found                         Would you like a call back once the refill request has been completed: [] Yes [] No    If the office needs to give you a call back, can they leave a voicemail: [] Yes [] No    Naomi Guzman MA  03/04/25, 07:59 EST

## 2025-03-07 ENCOUNTER — HOSPITAL ENCOUNTER (OUTPATIENT)
Facility: HOSPITAL | Age: 65
Setting detail: HOSPITAL OUTPATIENT SURGERY
Discharge: HOME | End: 2025-03-07
Attending: ORTHOPAEDIC SURGERY | Admitting: ORTHOPAEDIC SURGERY
Payer: OTHER MISCELLANEOUS

## 2025-03-07 ENCOUNTER — ANESTHESIA (OUTPATIENT)
Dept: PERIOP | Facility: HOSPITAL | Age: 65
End: 2025-03-07
Payer: OTHER MISCELLANEOUS

## 2025-03-07 ENCOUNTER — ANESTHESIA EVENT (OUTPATIENT)
Dept: PERIOP | Facility: HOSPITAL | Age: 65
End: 2025-03-07
Payer: OTHER MISCELLANEOUS

## 2025-03-07 VITALS
RESPIRATION RATE: 16 BRPM | TEMPERATURE: 97.5 F | SYSTOLIC BLOOD PRESSURE: 174 MMHG | OXYGEN SATURATION: 95 % | HEART RATE: 83 BPM | DIASTOLIC BLOOD PRESSURE: 109 MMHG

## 2025-03-07 DIAGNOSIS — S83.232D COMPLEX TEAR OF MEDIAL MENISCUS OF LEFT KNEE AS CURRENT INJURY, SUBSEQUENT ENCOUNTER: Primary | ICD-10-CM

## 2025-03-07 DIAGNOSIS — G89.29 CHRONIC PAIN OF RIGHT KNEE: ICD-10-CM

## 2025-03-07 DIAGNOSIS — M25.561 CHRONIC PAIN OF RIGHT KNEE: ICD-10-CM

## 2025-03-07 PROCEDURE — 25010000002 HYDROMORPHONE PER 4 MG: Performed by: NURSE ANESTHETIST, CERTIFIED REGISTERED

## 2025-03-07 PROCEDURE — 29880 ARTHRS KNE SRG MNISECTMY M&L: CPT | Performed by: ORTHOPAEDIC SURGERY

## 2025-03-07 PROCEDURE — 25810000003 LACTATED RINGERS PER 1000 ML: Performed by: STUDENT IN AN ORGANIZED HEALTH CARE EDUCATION/TRAINING PROGRAM

## 2025-03-07 PROCEDURE — 25010000002 CEFAZOLIN PER 500 MG: Performed by: ORTHOPAEDIC SURGERY

## 2025-03-07 PROCEDURE — 25010000002 ONDANSETRON PER 1 MG: Performed by: NURSE ANESTHETIST, CERTIFIED REGISTERED

## 2025-03-07 PROCEDURE — 25010000002 PROPOFOL 200 MG/20ML EMULSION: Performed by: NURSE ANESTHETIST, CERTIFIED REGISTERED

## 2025-03-07 PROCEDURE — 25010000002 MIDAZOLAM PER 1 MG: Performed by: STUDENT IN AN ORGANIZED HEALTH CARE EDUCATION/TRAINING PROGRAM

## 2025-03-07 PROCEDURE — 29880 ARTHRS KNE SRG MNISECTMY M&L: CPT | Performed by: TECHNICIAN, OTHER

## 2025-03-07 PROCEDURE — 25010000002 LIDOCAINE PF 2% 2 % SOLUTION: Performed by: NURSE ANESTHETIST, CERTIFIED REGISTERED

## 2025-03-07 PROCEDURE — 25010000002 FENTANYL CITRATE (PF) 50 MCG/ML SOLUTION: Performed by: NURSE ANESTHETIST, CERTIFIED REGISTERED

## 2025-03-07 PROCEDURE — 25010000002 HYDROMORPHONE 1 MG/ML SOLUTION: Performed by: NURSE ANESTHETIST, CERTIFIED REGISTERED

## 2025-03-07 PROCEDURE — 25010000002 METHYLPREDNISOLONE PER 80 MG: Performed by: ORTHOPAEDIC SURGERY

## 2025-03-07 PROCEDURE — 25010000002 DEXAMETHASONE SODIUM PHOSPHATE 20 MG/5ML SOLUTION: Performed by: NURSE ANESTHETIST, CERTIFIED REGISTERED

## 2025-03-07 PROCEDURE — 25010000002 HYDRALAZINE PER 20 MG: Performed by: NURSE ANESTHETIST, CERTIFIED REGISTERED

## 2025-03-07 RX ORDER — HYDRALAZINE HYDROCHLORIDE 20 MG/ML
INJECTION INTRAMUSCULAR; INTRAVENOUS AS NEEDED
Status: DISCONTINUED | OUTPATIENT
Start: 2025-03-07 | End: 2025-03-07 | Stop reason: SURG

## 2025-03-07 RX ORDER — PROMETHAZINE HYDROCHLORIDE 25 MG/1
25 TABLET ORAL ONCE AS NEEDED
Status: DISCONTINUED | OUTPATIENT
Start: 2025-03-07 | End: 2025-03-07 | Stop reason: HOSPADM

## 2025-03-07 RX ORDER — HYDROCODONE BITARTRATE AND ACETAMINOPHEN 5; 325 MG/1; MG/1
1 TABLET ORAL EVERY 4 HOURS PRN
Qty: 12 TABLET | Refills: 0 | Status: SHIPPED | OUTPATIENT
Start: 2025-03-07

## 2025-03-07 RX ORDER — IPRATROPIUM BROMIDE AND ALBUTEROL SULFATE 2.5; .5 MG/3ML; MG/3ML
3 SOLUTION RESPIRATORY (INHALATION) ONCE AS NEEDED
Status: DISCONTINUED | OUTPATIENT
Start: 2025-03-07 | End: 2025-03-07 | Stop reason: HOSPADM

## 2025-03-07 RX ORDER — ONDANSETRON 2 MG/ML
4 INJECTION INTRAMUSCULAR; INTRAVENOUS ONCE AS NEEDED
Status: COMPLETED | OUTPATIENT
Start: 2025-03-07 | End: 2025-03-07

## 2025-03-07 RX ORDER — LABETALOL HYDROCHLORIDE 5 MG/ML
5 INJECTION, SOLUTION INTRAVENOUS
Status: DISCONTINUED | OUTPATIENT
Start: 2025-03-07 | End: 2025-03-07 | Stop reason: HOSPADM

## 2025-03-07 RX ORDER — TRAMADOL HYDROCHLORIDE 50 MG/1
50 TABLET ORAL EVERY 6 HOURS PRN
Qty: 20 TABLET | Refills: 0 | Status: SHIPPED | OUTPATIENT
Start: 2025-03-07

## 2025-03-07 RX ORDER — FENTANYL CITRATE 50 UG/ML
INJECTION, SOLUTION INTRAMUSCULAR; INTRAVENOUS AS NEEDED
Status: DISCONTINUED | OUTPATIENT
Start: 2025-03-07 | End: 2025-03-07 | Stop reason: SURG

## 2025-03-07 RX ORDER — SODIUM CHLORIDE, SODIUM LACTATE, POTASSIUM CHLORIDE, AND CALCIUM CHLORIDE .6; .31; .03; .02 G/100ML; G/100ML; G/100ML; G/100ML
IRRIGANT IRRIGATION AS NEEDED
Status: DISCONTINUED | OUTPATIENT
Start: 2025-03-07 | End: 2025-03-07 | Stop reason: HOSPADM

## 2025-03-07 RX ORDER — EPHEDRINE SULFATE 50 MG/ML
5 INJECTION, SOLUTION INTRAVENOUS ONCE AS NEEDED
Status: DISCONTINUED | OUTPATIENT
Start: 2025-03-07 | End: 2025-03-07 | Stop reason: HOSPADM

## 2025-03-07 RX ORDER — ATROPINE SULFATE 0.4 MG/ML
0.4 INJECTION, SOLUTION INTRAMUSCULAR; INTRAVENOUS; SUBCUTANEOUS ONCE AS NEEDED
Status: DISCONTINUED | OUTPATIENT
Start: 2025-03-07 | End: 2025-03-07 | Stop reason: HOSPADM

## 2025-03-07 RX ORDER — LIDOCAINE HYDROCHLORIDE 20 MG/ML
INJECTION, SOLUTION EPIDURAL; INFILTRATION; INTRACAUDAL; PERINEURAL AS NEEDED
Status: DISCONTINUED | OUTPATIENT
Start: 2025-03-07 | End: 2025-03-07 | Stop reason: SURG

## 2025-03-07 RX ORDER — NALOXONE HCL 0.4 MG/ML
0.2 VIAL (ML) INJECTION AS NEEDED
Status: DISCONTINUED | OUTPATIENT
Start: 2025-03-07 | End: 2025-03-07 | Stop reason: HOSPADM

## 2025-03-07 RX ORDER — SODIUM CHLORIDE 0.9 % (FLUSH) 0.9 %
3-10 SYRINGE (ML) INJECTION AS NEEDED
Status: DISCONTINUED | OUTPATIENT
Start: 2025-03-07 | End: 2025-03-07 | Stop reason: HOSPADM

## 2025-03-07 RX ORDER — SODIUM CHLORIDE 0.9 % (FLUSH) 0.9 %
3 SYRINGE (ML) INJECTION EVERY 12 HOURS SCHEDULED
Status: DISCONTINUED | OUTPATIENT
Start: 2025-03-07 | End: 2025-03-07 | Stop reason: HOSPADM

## 2025-03-07 RX ORDER — HYDROMORPHONE HYDROCHLORIDE 1 MG/ML
0.5 INJECTION, SOLUTION INTRAMUSCULAR; INTRAVENOUS; SUBCUTANEOUS
Status: DISCONTINUED | OUTPATIENT
Start: 2025-03-07 | End: 2025-03-07 | Stop reason: HOSPADM

## 2025-03-07 RX ORDER — DIPHENHYDRAMINE HYDROCHLORIDE 50 MG/ML
12.5 INJECTION INTRAMUSCULAR; INTRAVENOUS
Status: DISCONTINUED | OUTPATIENT
Start: 2025-03-07 | End: 2025-03-07 | Stop reason: HOSPADM

## 2025-03-07 RX ORDER — FENTANYL CITRATE 50 UG/ML
50 INJECTION, SOLUTION INTRAMUSCULAR; INTRAVENOUS
Status: DISCONTINUED | OUTPATIENT
Start: 2025-03-07 | End: 2025-03-07 | Stop reason: HOSPADM

## 2025-03-07 RX ORDER — SODIUM CHLORIDE, SODIUM LACTATE, POTASSIUM CHLORIDE, CALCIUM CHLORIDE 600; 310; 30; 20 MG/100ML; MG/100ML; MG/100ML; MG/100ML
9 INJECTION, SOLUTION INTRAVENOUS CONTINUOUS
Status: DISCONTINUED | OUTPATIENT
Start: 2025-03-07 | End: 2025-03-07 | Stop reason: HOSPADM

## 2025-03-07 RX ORDER — ONDANSETRON 2 MG/ML
INJECTION INTRAMUSCULAR; INTRAVENOUS AS NEEDED
Status: DISCONTINUED | OUTPATIENT
Start: 2025-03-07 | End: 2025-03-07 | Stop reason: SURG

## 2025-03-07 RX ORDER — MIDAZOLAM HYDROCHLORIDE 1 MG/ML
1 INJECTION, SOLUTION INTRAMUSCULAR; INTRAVENOUS
Status: COMPLETED | OUTPATIENT
Start: 2025-03-07 | End: 2025-03-07

## 2025-03-07 RX ORDER — PROPOFOL 10 MG/ML
INJECTION, EMULSION INTRAVENOUS AS NEEDED
Status: DISCONTINUED | OUTPATIENT
Start: 2025-03-07 | End: 2025-03-07 | Stop reason: SURG

## 2025-03-07 RX ORDER — TRAMADOL HYDROCHLORIDE 50 MG/1
50 TABLET ORAL ONCE AS NEEDED
Status: COMPLETED | OUTPATIENT
Start: 2025-03-07 | End: 2025-03-07

## 2025-03-07 RX ORDER — LIDOCAINE HYDROCHLORIDE 10 MG/ML
0.5 INJECTION, SOLUTION INFILTRATION; PERINEURAL ONCE AS NEEDED
Status: DISCONTINUED | OUTPATIENT
Start: 2025-03-07 | End: 2025-03-07 | Stop reason: HOSPADM

## 2025-03-07 RX ORDER — PROMETHAZINE HYDROCHLORIDE 25 MG/1
25 SUPPOSITORY RECTAL ONCE AS NEEDED
Status: DISCONTINUED | OUTPATIENT
Start: 2025-03-07 | End: 2025-03-07 | Stop reason: HOSPADM

## 2025-03-07 RX ORDER — HYDRALAZINE HYDROCHLORIDE 20 MG/ML
5 INJECTION INTRAMUSCULAR; INTRAVENOUS
Status: DISCONTINUED | OUTPATIENT
Start: 2025-03-07 | End: 2025-03-07 | Stop reason: HOSPADM

## 2025-03-07 RX ORDER — FLUMAZENIL 0.1 MG/ML
0.2 INJECTION INTRAVENOUS AS NEEDED
Status: DISCONTINUED | OUTPATIENT
Start: 2025-03-07 | End: 2025-03-07 | Stop reason: HOSPADM

## 2025-03-07 RX ORDER — DEXAMETHASONE SODIUM PHOSPHATE 4 MG/ML
INJECTION, SOLUTION INTRA-ARTICULAR; INTRALESIONAL; INTRAMUSCULAR; INTRAVENOUS; SOFT TISSUE AS NEEDED
Status: DISCONTINUED | OUTPATIENT
Start: 2025-03-07 | End: 2025-03-07 | Stop reason: SURG

## 2025-03-07 RX ORDER — FAMOTIDINE 10 MG/ML
20 INJECTION, SOLUTION INTRAVENOUS ONCE
Status: COMPLETED | OUTPATIENT
Start: 2025-03-07 | End: 2025-03-07

## 2025-03-07 RX ADMIN — HYDROMORPHONE HYDROCHLORIDE 1 MG: 1 INJECTION, SOLUTION INTRAMUSCULAR; INTRAVENOUS; SUBCUTANEOUS at 12:37

## 2025-03-07 RX ADMIN — LIDOCAINE HYDROCHLORIDE 100 MG: 20 INJECTION, SOLUTION EPIDURAL; INFILTRATION; INTRACAUDAL; PERINEURAL at 12:31

## 2025-03-07 RX ADMIN — TRAMADOL HYDROCHLORIDE 50 MG: 50 TABLET, COATED ORAL at 13:35

## 2025-03-07 RX ADMIN — SODIUM CHLORIDE, SODIUM LACTATE, POTASSIUM CHLORIDE, AND CALCIUM CHLORIDE 9 ML/HR: 600; 310; 30; 20 INJECTION, SOLUTION INTRAVENOUS at 10:45

## 2025-03-07 RX ADMIN — HYDRALAZINE HYDROCHLORIDE 5 MG: 20 INJECTION INTRAMUSCULAR; INTRAVENOUS at 12:55

## 2025-03-07 RX ADMIN — ONDANSETRON 4 MG: 2 INJECTION, SOLUTION INTRAMUSCULAR; INTRAVENOUS at 12:35

## 2025-03-07 RX ADMIN — PROPOFOL 200 MG: 10 INJECTION, EMULSION INTRAVENOUS at 12:31

## 2025-03-07 RX ADMIN — Medication 3 ML: at 10:57

## 2025-03-07 RX ADMIN — ONDANSETRON 4 MG: 2 INJECTION, SOLUTION INTRAMUSCULAR; INTRAVENOUS at 13:20

## 2025-03-07 RX ADMIN — FENTANYL CITRATE 50 MCG: 50 INJECTION, SOLUTION INTRAMUSCULAR; INTRAVENOUS at 12:37

## 2025-03-07 RX ADMIN — MIDAZOLAM HYDROCHLORIDE 1 MG: 1 INJECTION, SOLUTION INTRAMUSCULAR; INTRAVENOUS at 11:57

## 2025-03-07 RX ADMIN — MIDAZOLAM HYDROCHLORIDE 1 MG: 1 INJECTION, SOLUTION INTRAMUSCULAR; INTRAVENOUS at 10:52

## 2025-03-07 RX ADMIN — FENTANYL CITRATE 50 MCG: 50 INJECTION, SOLUTION INTRAMUSCULAR; INTRAVENOUS at 13:45

## 2025-03-07 RX ADMIN — FAMOTIDINE 20 MG: 10 INJECTION INTRAVENOUS at 10:56

## 2025-03-07 RX ADMIN — CEFAZOLIN 2 G: 2 INJECTION, POWDER, FOR SOLUTION INTRAMUSCULAR; INTRAVENOUS at 12:20

## 2025-03-07 RX ADMIN — DEXAMETHASONE SODIUM PHOSPHATE 12 MG: 4 INJECTION, SOLUTION INTRAMUSCULAR; INTRAVENOUS at 12:35

## 2025-03-07 RX ADMIN — SODIUM CHLORIDE, SODIUM LACTATE, POTASSIUM CHLORIDE, AND CALCIUM CHLORIDE: 600; 310; 30; 20 INJECTION, SOLUTION INTRAVENOUS at 12:57

## 2025-03-07 RX ADMIN — HYDROMORPHONE HYDROCHLORIDE 0.5 MG: 1 INJECTION, SOLUTION INTRAMUSCULAR; INTRAVENOUS; SUBCUTANEOUS at 13:20

## 2025-03-07 NOTE — H&P
History & Physical       Patient: SIMON Gomez Jr.    Date of Admission: 3/7/2025  9:12 AM    YOB: 1960    Medical Record Number: [id number]    Attending Physician: Destiney Tucker MD        Chief Complaints: Chronic pain of right knee [M25.561, G89.29]      History of Present Illness: This patient has a several week history of right knee faint pain following a day distinct episode he has an exam and an MRI which consistent with a medial meniscus tear his symptoms are truly mechanical he presents for arthroscopy     Allergies:   Allergies   Allergen Reactions    Meperidine Anaphylaxis     Per patient's wife, he stopped breathing    Hydrocodone-Acetaminophen Hallucinations    Oxycodone-Acetaminophen Hallucinations       Medications:   Home Medications:  No current facility-administered medications on file prior to encounter.     Current Outpatient Medications on File Prior to Encounter   Medication Sig    anastrozole (ARIMIDEX) 1 MG tablet Take  by mouth Daily.    atorvastatin (LIPITOR) 80 MG tablet TAKE 1 TABLET BY MOUTH ONCE DAILY AT NIGHT    Cholecalciferol (VITAMIN D PO) Take 10,000 Units by mouth Every Night.    clopidogrel (PLAVIX) 75 MG tablet Take 1 tablet by mouth once daily (Patient taking differently: Take 1 tablet by mouth Daily. HOLD PER MD INSTR)    Cyanocobalamin (VITAMIN B 12 PO) Take 1,000 mcg by mouth Every Night. HOLD PER MD INSTR    losartan (COZAAR) 50 MG tablet TAKE 1 TABLET BY MOUTH ONCE DAILY AT NIGHT (Patient taking differently: Take 1 tablet by mouth Every Night. HOLD PER MD INSTR-HOLD THE NIGHT BEFORE SURGERY.)    metoprolol succinate XL (TOPROL-XL) 25 MG 24 hr tablet TAKE 1/2 (ONE-HALF) TABLET BY MOUTH ONCE DAILY AT NIGHT    tadalafil (Cialis) 20 MG tablet Take 1 tablet by mouth Daily As Needed for Erectile Dysfunction. (Patient taking differently: Take 1 tablet by mouth Daily As Needed for Erectile Dysfunction. HOLD PER MD INSTR-HOLD 72 HOURS)    vitamin C (ASCORBIC  "ACID) 250 MG tablet Take 1 tablet by mouth Every Night. HOLD PER MD INSTR    zinc sulfate (ZINCATE) 220 (50 Zn) MG capsule Take 1 capsule by mouth Daily. HOLD PER MD INSTR    azithromycin (Zithromax Z-Venkatesh) 250 MG tablet Take 2 tablets by mouth on day 1, then 1 tablet daily on days 2-5     Current Medications:  Scheduled Meds:ceFAZolin, 2 g, Intravenous, Once  ethyl alcohol, 2 Swab, Nasal, Once  sodium chloride, 3 mL, Intravenous, Q12H      Continuous Infusions:lactated ringers, 9 mL/hr, Last Rate: 9 mL/hr (03/07/25 1045)      PRN Meds:.  lidocaine    midazolam    sodium chloride    Past Medical History:   Diagnosis Date    Abnormal heart rhythm     HISTORY OF OF (PT STATES EP STUDY BY DR BENNETT YEARS AGO), FOLLOWED BY DR GALEANO CURRENTLY    Anesthesia complication     PT STATES BECAME \"HYPOXIC UNDER GAS\" YEARS AGO     Arthritis     Cancer     SQUAMOUS CELL CARCINOMA ON BACK 1990.    Colorblind     Coronary artery disease involving native coronary artery of native heart with angina pectoris     Ex-smoker     History of COVID-19 01/2021    BILAT PNEUMONIA     History of GI bleed     Hyperlipidemia LDL goal <70 01/02/2019    Hypertension     Hypogonadism in male 10/21/2016    Hypothyroidism     Pneumonia due to COVID-19 virus     ST elevation myocardial infarction (STEMI) 12/26/2018    Tear of meniscus of knee     Testosterone deficiency         Past Surgical History:   Procedure Laterality Date    ADENOIDECTOMY      ANGIOPLASTY      STENT PLACEMENT X 5 (2018,2019)     BACK SURGERY      L5-S1    CARDIAC CATHETERIZATION N/A 12/26/2018    Procedure: Left Heart Cath;  Surgeon: Анна Galeano MD;  Location:  [facility];  Service: Cardiovascular    CARDIAC CATHETERIZATION N/A 12/26/2018    Procedure: Coronary angiography;  Surgeon: Анна Galeano MD;  Location:  [facility];  Service: Cardiovascular    CARDIAC CATHETERIZATION N/A 12/26/2018    Procedure: Stent REMA coronary;  Surgeon: Ade" MD Анна;  Location:  [facility];  Service: Cardiovascular    CARDIAC CATHETERIZATION  12/26/2018    Procedure: Percutaneous Mechanical Thrombectomy;  Surgeon: Анна Booker MD;  Location[facility];  Service: Cardiovascular    CARDIAC CATHETERIZATION N/A 02/13/2019    Procedure: Coronary angiography;  Surgeon: Анна Booker MD;  Location:  [facility];  Service: Cardiology    CARDIAC CATHETERIZATION N/A 02/13/2019    Procedure: Stent REMA coronary;  Surgeon: Анна Booker MD;  [facility];  Service: Cardiology    CARDIAC CATHETERIZATION N/A 02/13/2019    Procedure: Left Heart Cath;  Surgeon: Анна Booker MD;  [facility];  Service: Cardiology    CARDIAC CATHETERIZATION N/A 02/13/2019    Procedure: Left ventriculography;  Surgeon: Анна Booker MD;  [facility];  Service: Cardiology    CARDIAC CATHETERIZATION  2/13/2019    CARDIAC ELECTROPHYSIOLOGY STUDY AND ABLATION      CATARACT EXTRACTION Right 01/04/2023    CATARACT EXTRACTION Left 01/11/2023    CATARACT EXTRACTION, BILATERAL      COLONOSCOPY      COLONOSCOPY N/A 06/26/2017    Procedure: COLONOSCOPY AT BEDSIDE;  Surgeon: Fadi Gallagher MD;  Location: [facility] ENDOSCOPY;  Service:     EXTERNAL EAR SURGERY      HAND SURGERY Left     INGUINAL HERNIA REPAIR      X3    KNEE ARTHROSCOPY Left 01/19/2022    Procedure: KNEE ARTHROSCOPY, PARTIAL MEDIAL MENISECTOMY, DEBRIDEMENT OF ARTHRITIS, REMOVAL OF LOSE BODIES;  Surgeon: Destiney Tucker MD;  Location: [facility] OR [facility];  Service: Orthopedics;  Laterality: Left;    KNEE SURGERY      MANDIBLE SURGERY      WIRED X 2  - HIGH SCHOOL    NOSE SURGERY      CO RT/LT HEART CATHETERS N/A 12/26/2018    Procedure: Percutaneous Coronary Intervention;  Surgeon: Анна Booker MD;  [facility];  Service: Cardiovascular    ROTATOR CUFF REPAIR Right     SHOULDER  ARTHROSCOPY Right     SHOULDER ARTHROSCOPY Left     SHOULDER SURGERY      THYROIDECTOMY      TONSILLECTOMY          Social History     Occupational History    Not on file   Tobacco Use    Smoking status: Former     Current packs/day: 0.00     Types: Cigars, Cigarettes    Smokeless tobacco: Never    Tobacco comments:     SOCIAL CIGAR SMOKING ONLY QUIT 2009   Vaping Use    Vaping status: Never Used   Substance and Sexual Activity    Alcohol use: Yes     Comment: Rarely    Drug use: No    Sexual activity: Defer     Partners: Female      Social History     Social History Narrative    Works at [employer]            Family History   Problem Relation Age of Onset    Arthritis Mother     No Known Problems Father     No Known Problems Brother     No Known Problems Daughter     No Known Problems Brother     Malig Hyperthermia Neg Hx        Review of Systems      Physical Exam: 65 y.o. male  General Appearance:    Alert, cooperative, in no acute distress                      Vitals:    03/07/25 0950 03/07/25 1000 03/07/25 1018 03/07/25 1103   BP: (!) 177/119 (!) 171/117 (!) 171/119 (!) 143/105   BP Location: Right arm      Patient Position: Sitting      Pulse:  66 68 68   Resp: 16      Temp: 98 °F (36.7 °C)      TempSrc: Oral      SpO2: 96% 96%  97%        Head:  Normocephalic, without obvious abnormality, atraumatic   Eyes:          Conjunctivae and sclerae normal, no pallor, corneas clear,    Ears:  Ears appear intact with no abnormalities noted   Throat: No oral lesions, no thrush, oral mucosa moist   Neck: No adenopathy, supple, trachea midline, no thyromegaly,    Back:   No kyphosis present, no scoliosis present, no skin lesions,      erythema or scars, no tenderness to percussion or                   palpation,range of motion normal   Lungs:   C respirations regular, even and                 unlabored    Heart:  Regular rhythm and normal rate               Chest Wall:  No abnormalities  observed               Pulses: Pulses palpable and equal bilaterally   Skin: No bleeding, bruising or rash   Lymph nodes: No palpable adenopathy   Neurologic: Appears neurologic intact             Assessment:    Chronic pain of right knee          Plan: All risks, benefits and alternatives were discussed.  Risks including but not exclusive to anesthetic complications, including death, MI, CVA, infection, bleeding DVT, PE,  fracture, residual pain and need for future surgery.  Patient understood all and agrees to proceed.

## 2025-03-07 NOTE — ANESTHESIA PREPROCEDURE EVALUATION
Anesthesia Evaluation     Patient summary reviewed and Nursing notes reviewed   NPO Solid Status: > 8 hours  NPO Liquid Status: > 2 hours           Airway   Mallampati: II  TM distance: >3 FB  Neck ROM: full  Dental      Pulmonary - negative pulmonary ROS   Cardiovascular   Exercise tolerance: good (4-7 METS)    ECG reviewed    (+) hypertension, past MI (2018) , CAD, cardiac stents (2018) Drug eluting stent , hyperlipidemia  (-) angina, CHF      Neuro/Psych- negative ROS  GI/Hepatic/Renal/Endo    (+) thyroid problem hypothyroidism    Musculoskeletal     Abdominal    Substance History      OB/GYN          Other   arthritis,   history of cancer                  Anesthesia Plan    ASA 3     general     intravenous induction     Anesthetic plan, risks, benefits, and alternatives have been provided, discussed and informed consent has been obtained with: patient.      CODE STATUS:

## 2025-03-07 NOTE — OP NOTE
Operative Note      Facility: [facility]  Patient Name: SIMON Gomez Jr.  YOB: 1960  Date: 3/7/2025  Medical Record Number: [id number]      Pre-op Diagnosis:   Chronic pain of right knee [M25.561, G89.29]    Post-Op Diagnosis Codes:     * Chronic pain of right knee [M25.561, G89.29]  Complex tear medial meniscus grade 3 and 4 changes medial femoral condyle, central tear lateral meniscus, grade 3 changes patellofemoral joint  Procedure(s):  Right Knee Arthroscopy with partial medial and partial lateral meniscectomy with debridement of arthritis chondroplasty of the medial femoral condyle patellofemoral joint    Surgeon(s):  Destiney Tucker MD    Anesthesia: General  Anesthesiologist: Ortiz Archibald MD  CRNA: Ellen Goss CRNA    Staff:   Circulator: Otilia Cody RN  Scrub Person: Jl Douglas; Adwoa Argueta  Vendor Representative: Chris Dominguez  Assistant: Naomi Bryant CSA    Assistants : Naomi Bryant first assist.  First assist was used throughout the case for proposition patient on the table, existing extremity during the procedure and closure      Estimated Blood Loss: 5 mL    Specimens:    none     Drains: None    Findings: See Dictation    Complications: None      Indication for procedure: This patient has had a several month history of knee pain and has an exam and an MRI which are consistent with meniscal pathology. They understand all options and wish to proceed with arthroscopy.      Description of procedure: The patient was taken to the operating room. They were placed supine on the operating room table. After induction of adequate LMA anesthesia, IV antibiotics the patient underwent exam under anesthesia with symmetric full range of motion. Nonsterile tourniquet was applied patient was placed in the thigh amor all prominent areas were well padded and end of the table dropped. The leg was prepped and draped in usual sterile fashion.  Standard lateral incision was made with 11 blade. Blunt trocar penetrated into the joint, scope followed and the evaluation began.  The patella.  Is essentially within the trochlear grooves that he had marked degenerative changes on both sides felt to be grade 3 some areas of grade 4 he had marked synovitis throughout the knee.  I then entered the medial compartment under spinal needle localization direct visualization a medial portal was established he had a complex tear of the medial meniscus with a large flap that was flipped posteriorly.  This was all resected with various angles biters baskets motorized aroldo ultimately Apollo device he had diffuse grade 3 and some isolated grade 4 changes medial femoral condyle that were gently debrided with a motorized shaver the notch was normal lateral compartment is normal except for the fact he had a small lateral meniscus tear in the central aspect of the body this was debrided with a motorized shaver and the Apollo device and then turned my attention patellofemoral joint gently debrided both sides            At this point everything was thoroughly irrigated it was suctioned all 3 compartments, the gutters the suprapatellar pouch were all evaluated there was no further acute pathology seen.  Everything was thoroughly irrigated it was injected with Marcaine Depo-Medrol.  The portals were closed with 3-0 nylon interrupted fashion.  Sterile dressings and Ace wraps were applied.  The patient tolerated the procedure well and was taken to recovery room in good condition.  All sponge and needle counts were correct.                    Date: 3/7/2025  Time: 13:15 EST

## 2025-03-07 NOTE — ANESTHESIA POSTPROCEDURE EVALUATION
Patient: SIMON Gomez Jr.    Procedure Summary       Date: 03/07/25 Room / Location:  [facility]  /  [facility]    Anesthesia Start: 1222 Anesthesia Stop: 1312    Procedure: Right Knee Arthroscopy with partial medial and partial lateral meniscectomy with debridement of arthritis (Right: Knee) Diagnosis:       Chronic pain of right knee      (Chronic pain of right knee [M25.561, G89.29])    Surgeons: Destiney Tucker MD Provider: Ortiz Archibald MD    Anesthesia Type: general ASA Status: 3            Anesthesia Type: general    Vitals  Vitals Value Taken Time   /103 03/07/25 1408   Temp 36.4 °C (97.5 °F) 03/07/25 1308   Pulse 80 03/07/25 1410   Resp 16 03/07/25 1401   SpO2 96 % 03/07/25 1410   Vitals shown include unfiled device data.        Post Anesthesia Care and Evaluation    Level of consciousness: awake and alert  Pain management: adequate    Airway patency: patent  Anesthetic complications: No anesthetic complications  PONV Status: controlled  Cardiovascular status: blood pressure returned to baseline and acceptable  Respiratory status: acceptable  Hydration status: acceptable

## 2025-03-07 NOTE — ANESTHESIA PROCEDURE NOTES
Airway  Urgency: elective    Date/Time: 3/7/2025 12:33 PM  Airway not difficult    General Information and Staff    Patient location during procedure: OR  Anesthesiologist: Ortiz Archibald MD  CRNA/CAA: Ellen Goss CRNA    Indications and Patient Condition  Indications for airway management: airway protection    Preoxygenated: yes  MILS maintained throughout  Mask difficulty assessment: 0 - not attempted    Final Airway Details  Final airway type: supraglottic airway      Successful airway: classic  Size 5     Number of attempts at approach: 1  Assessment: lips, teeth, and gum same as pre-op and atraumatic intubation

## 2025-03-07 NOTE — PROGRESS NOTES
Knee Scope follow Up 1st Visit      Patient: SIMON Gomez Jr.        YOB: 1960      Chief Complaints: knee pain right      History of Present Illness: Pt is here f/u knee arthroscopy on the right he states he is doing great he feels much better than before surgery happy with where he is        Allergies:   Allergies   Allergen Reactions    Meperidine Anaphylaxis     Per patient's wife, he stopped breathing    Hydrocodone-Acetaminophen Hallucinations    Oxycodone-Acetaminophen Hallucinations       Medications:   Home Medications:  Current Outpatient Medications on File Prior to Visit   Medication Sig    anastrozole (ARIMIDEX) 1 MG tablet Take  by mouth Daily.    atorvastatin (LIPITOR) 80 MG tablet TAKE 1 TABLET BY MOUTH ONCE DAILY AT NIGHT    azithromycin (Zithromax Z-Venkatesh) 250 MG tablet Take 2 tablets by mouth on day 1, then 1 tablet daily on days 2-5    Cholecalciferol (VITAMIN D PO) Take 10,000 Units by mouth Every Night.    clopidogrel (PLAVIX) 75 MG tablet Take 1 tablet by mouth once daily (Patient taking differently: Take 1 tablet by mouth Daily. HOLD PER MD INSTR)    Cyanocobalamin (VITAMIN B 12 PO) Take 1,000 mcg by mouth Every Night. HOLD PER MD INSTR    levothyroxine (SYNTHROID, LEVOTHROID) 150 MCG tablet Take 1 tablet by mouth once daily    losartan (COZAAR) 50 MG tablet TAKE 1 TABLET BY MOUTH ONCE DAILY AT NIGHT (Patient taking differently: Take 1 tablet by mouth Every Night. HOLD PER MD INSTR-HOLD THE NIGHT BEFORE SURGERY.)    metoprolol succinate XL (TOPROL-XL) 25 MG 24 hr tablet TAKE 1/2 (ONE-HALF) TABLET BY MOUTH ONCE DAILY AT NIGHT    tadalafil (Cialis) 20 MG tablet Take 1 tablet by mouth Daily As Needed for Erectile Dysfunction. (Patient taking differently: Take 1 tablet by mouth Daily As Needed for Erectile Dysfunction. HOLD PER MD INSTR-HOLD 72 HOURS)    vitamin C (ASCORBIC ACID) 250 MG tablet Take 1 tablet by mouth Every Night. HOLD PER MD INSTR    zinc sulfate (ZINCATE) 220 (50  Zn) MG capsule Take 1 capsule by mouth Daily. HOLD PER MD RUSSO     No current facility-administered medications on file prior to visit.     Current Medications:  Scheduled Meds:  Continuous Infusions:No current facility-administered medications for this visit.    PRN Meds:.          Physical Exam: 65 y.o. male  General Appearance:    Alert, cooperative, in no acute distress                 There were no vitals filed for this visit.   Patient is alert and oriented ×3 no acute distress normal mood physical exam.  Physical exam of the knee, incisions looked good there is no erythema, calf is soft and non-tender.  No sign or sx of DVT      Assessment  S/P knee scope.  I did review intraoperative findings and arthroscopic pictures with the patient.          Plan: To remove sutures today place Steri-Strips and start into  physical therapy and I will have thrm follow up in 4 weeks.  I will allow him to return to work on April 1 I really want him to continue to work on quad and core strengthening, maintaining an ideal body weight he understands he does have some arthritis in his knee and that will get worse over time                  Answers submitted by the patient for this visit:  Post Operative Visit (Submitted on 3/18/2025)  Chief Complaint: Follow-up  Pain Control: well controlled  Fever: no fever  Diet: adequate intake  Activity: returning to normal  Operative Site Issues: No  Additional information: On March 15, 2025 i had some fluid which came out of the suture on the left of my right knee.  I had additional fluid which came out on March 16, 2025.  Prior to March 15 my knee was feeling great no pain by end of day i was feeling pain.

## 2025-03-10 RX ORDER — ATORVASTATIN CALCIUM 80 MG/1
80 TABLET, FILM COATED ORAL NIGHTLY
Qty: 90 TABLET | Refills: 0 | Status: SHIPPED | OUTPATIENT
Start: 2025-03-10

## 2025-03-17 RX ORDER — LOSARTAN POTASSIUM 50 MG/1
50 TABLET ORAL NIGHTLY
Qty: 90 TABLET | Refills: 0 | Status: SHIPPED | OUTPATIENT
Start: 2025-03-17

## 2025-03-20 ENCOUNTER — OFFICE VISIT (OUTPATIENT)
Dept: ORTHOPEDIC SURGERY | Facility: CLINIC | Age: 65
End: 2025-03-20
Payer: OTHER MISCELLANEOUS

## 2025-03-20 VITALS — WEIGHT: 254.6 LBS | TEMPERATURE: 98 F | BODY MASS INDEX: 34.53 KG/M2

## 2025-03-20 DIAGNOSIS — Z98.890 S/P ARTHROSCOPY OF KNEE: Primary | ICD-10-CM

## 2025-03-20 PROCEDURE — 99024 POSTOP FOLLOW-UP VISIT: CPT | Performed by: ORTHOPAEDIC SURGERY

## 2025-03-20 NOTE — LETTER
March 20, 2025     Patient: SIMON Gomez Jr.   YOB: 1960   Date of Visit: 3/20/2025       To Whom It May Concern:    It is my medical opinion that SIMON Gomez can return to work without restrictions on April 1, 2025.           Sincerely,        Destiney Tucker MD    CC: No Recipients

## 2025-04-29 RX ORDER — METOPROLOL SUCCINATE 25 MG/1
12.5 TABLET, EXTENDED RELEASE ORAL NIGHTLY
Qty: 45 TABLET | Refills: 1 | Status: SHIPPED | OUTPATIENT
Start: 2025-04-29

## 2025-05-29 RX ORDER — LEVOTHYROXINE SODIUM 150 UG/1
150 TABLET ORAL DAILY
Qty: 90 TABLET | Refills: 0 | Status: SHIPPED | OUTPATIENT
Start: 2025-05-29

## 2025-06-03 ENCOUNTER — OFFICE VISIT (OUTPATIENT)
Age: 65
End: 2025-06-03
Payer: COMMERCIAL

## 2025-06-03 VITALS
DIASTOLIC BLOOD PRESSURE: 90 MMHG | SYSTOLIC BLOOD PRESSURE: 128 MMHG | BODY MASS INDEX: 34.4 KG/M2 | HEIGHT: 72 IN | HEART RATE: 101 BPM | WEIGHT: 254 LBS

## 2025-06-03 DIAGNOSIS — R00.2 PALPITATIONS: Primary | ICD-10-CM

## 2025-06-03 PROCEDURE — 99214 OFFICE O/P EST MOD 30 MIN: CPT | Performed by: INTERNAL MEDICINE

## 2025-06-03 PROCEDURE — 93000 ELECTROCARDIOGRAM COMPLETE: CPT | Performed by: INTERNAL MEDICINE

## 2025-06-03 NOTE — PROGRESS NOTES
"    Subjective:     Encounter Date: 06/03/25        Patient ID: SIMON Gomez Jr. is a 65 y.o. male.    Chief Complaint: CAD    This is a 65 year old male with history of CAD, HTN, HLD, & hypothyroidism who came into the hospital in December 2018 for chest pain. EKG ruled him in for STEMI and he underwent cardiac catheterization which found occluded circumflex. He received a 3x15mm Xience Cindy REMA to the proximal circumflex . He also had severe mid LAD stenosis, which was intervened on in early 2019 with 3 overlapping drug eluting stents to the proximal-mid LAD.  LV function is preserved.     He is doing well.  When he was last here he noted some mild exertional dyspnea he completed a treadmill stress test was able to achieve 81% of his predicted heart rate which is mildly submaximal.  There were no ischemic changes to that level.  Since then he continues to feel well.  He has had knee surgery which is limited as his exercise.  Has had 2 episodes of brief palpitations associated with presyncope in the last month.  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    REVIEW OF SYSTEMS:   All systems reviewed.  Pertinent positives identified in HPI.  All other systems are negative.    The following portions of the patient's history were reviewed and updated as appropriate: allergies, current medications, past family history, past medical history, past social history, past surgical history and problem list.    Past Medical History:   Diagnosis Date    Abnormal heart rhythm     HISTORY OF OF (PT STATES EP STUDY BY DR BENNETT YEARS AGO), FOLLOWED BY DR GALEANO CURRENTLY    Anesthesia complication     PT STATES BECAME \"HYPOXIC UNDER GAS\" YEARS AGO     Arthritis     Atrial fibrillation     Cancer     SQUAMOUS CELL CARCINOMA ON BACK 1990.    Colorblind     Coronary artery disease involving native coronary artery of native heart with angina " pectoris     Ex-smoker     History of COVID-19 01/2021    BILAT PNEUMONIA     History of GI bleed     Hyperlipidemia LDL goal <70 01/02/2019    Hypertension     Hypogonadism in male 10/21/2016    Hypothyroidism     Pneumonia due to COVID-19 virus     ST elevation myocardial infarction (STEMI) 12/26/2018    Tear of meniscus of knee     Testosterone deficiency        Family History   Problem Relation Age of Onset    Arthritis Mother     No Known Problems Father     No Known Problems Brother     No Known Problems Daughter     No Known Problems Brother     Malig Hyperthermia Neg Hx        Social History     Socioeconomic History    Marital status:    Tobacco Use    Smoking status: Former     Types: Cigars, Cigarettes    Smokeless tobacco: Never    Tobacco comments:     SOCIAL CIGAR SMOKING ONLY QUIT 2009   Vaping Use    Vaping status: Never Used   Substance and Sexual Activity    Alcohol use: Yes     Comment: Rarely    Drug use: No    Sexual activity: Defer     Partners: Female       Allergies   Allergen Reactions    Meperidine Anaphylaxis     Per patient's wife, he stopped breathing    Hydrocodone-Acetaminophen Hallucinations    Oxycodone-Acetaminophen Hallucinations       Past Surgical History:   Procedure Laterality Date    ADENOIDECTOMY      ANGIOPLASTY      STENT PLACEMENT X 5 (2018,2019)     BACK SURGERY      L5-S1    CARDIAC CATHETERIZATION N/A 12/26/2018    Procedure: Left Heart Cath;  Surgeon: Анна Booker MD;  Location:  [facility] CATH INVASIVE LOCATION;  Service: Cardiovascular    CARDIAC CATHETERIZATION N/A 12/26/2018    Procedure: Coronary angiography;  Surgeon: Анна Booker MD;  Location:  [facility] CATH INVASIVE LOCATION;  Service: Cardiovascular    CARDIAC CATHETERIZATION N/A 12/26/2018    Procedure: Stent REMA coronary;  Surgeon: Анна Booker MD;  Location:  [facility] CATH INVASIVE LOCATION;  Service: Cardiovascular    CARDIAC CATHETERIZATION  12/26/2018    Procedure: Percutaneous Mechanical  Thrombectomy;  Surgeon: Анна Booker MD;  Location: [facility] CATH INVASIVE LOCATION;  Service: Cardiovascular    CARDIAC CATHETERIZATION N/A 02/13/2019    Procedure: Coronary angiography;  Surgeon: Анна Booker MD;  [facility];  Service: Cardiology    CARDIAC CATHETERIZATION N/A 02/13/2019    Procedure: Stent REMA coronary;  Surgeon: Анна Booker MD;  [facility];  Service: Cardiology    CARDIAC CATHETERIZATION N/A 02/13/2019    Procedure: Left Heart Cath;  Surgeon: Анна Booker MD;  Location: [facility] CATH INVASIVE LOCATION;  Service: Cardiology    CARDIAC CATHETERIZATION N/A 02/13/2019    Procedure: Left ventriculography;  Surgeon: Анна Booker MD;  Location: [facility] CATH INVASIVE LOCATION;  Service: Cardiology    CARDIAC CATHETERIZATION  2/13/2019    CARDIAC ELECTROPHYSIOLOGY STUDY AND ABLATION      CATARACT EXTRACTION Right 01/04/2023    CATARACT EXTRACTION Left 01/11/2023    CATARACT EXTRACTION, BILATERAL      COLONOSCOPY      COLONOSCOPY N/A 06/26/2017    Procedure: COLONOSCOPY AT BEDSIDE;  Surgeon: Fadi Gallagher MD;  Location: [facility] ENDOSCOPY;  Service:     EXTERNAL EAR SURGERY      HAND SURGERY Left     INGUINAL HERNIA REPAIR      X3    KNEE ARTHROSCOPY Left 01/19/2022    Procedure: KNEE ARTHROSCOPY, PARTIAL MEDIAL MENISECTOMY, DEBRIDEMENT OF ARTHRITIS, REMOVAL OF LOSE BODIES;  Surgeon: Destiney Tucker MD;  Location: [facility] OR [facility];  Service: Orthopedics;  Laterality: Left;    KNEE ARTHROSCOPY Right 3/7/2025    Procedure: Right Knee Arthroscopy with partial medial and partial lateral meniscectomy with debridement of arthritis;  Surgeon: Destiney Tucker MD;  Location: [facility] OR [facility];  Service: Orthopedics;  Laterality: Right;    KNEE SURGERY      MANDIBLE SURGERY      WIRED X 2  - HIGH SCHOOL    NOSE SURGERY      MS RT/LT HEART CATHETERS N/A 12/26/2018    Procedure: Percutaneous Coronary Intervention;  Surgeon: Анна Booker MD;  Location:   [facility] CATH INVASIVE LOCATION;  Service: Cardiovascular    ROTATOR CUFF REPAIR Right     SHOULDER ARTHROSCOPY Right     SHOULDER ARTHROSCOPY Left     SHOULDER SURGERY      THYROIDECTOMY      TONSILLECTOMY               ECG 12 Lead    Date/Time: 6/3/2025 1:02 PM  Performed by: Анна Booker MD    Authorized by: Анна Booker MD  Comparison: compared with previous ECG from 2/28/2025  Rhythm: sinus tachycardia  Rate: tachycardic  Conduction: conduction normal  ST Segments: ST segments normal  T Waves: T waves normal  QRS axis: left  Other: no other findings    Clinical impression: non-specific ECG           Objective:     Physical Exam     GEN: no distress, alert and oriented, obese  Lungs CTAB, no rales or wheezes  Chest: no abnormalities  Heart: RRR, no murmurs  Abdo: soft,  Nontender, nondistended  Extr: no edema, +2 DP and 2+ carotid pulses b/l  Skin: no rash or bruising  Psych: organized thought, normal behavior and affect        Assessment:          Diagnosis Plan   1. Palpitations  Holter Monitor - 72 Hour Up To 15 Days           Plan:       1. CAD:  Lateral STEMI December 2018 with REMA to proximal circumflex, followed by staged proximal LAD intervention using 3 overlapping REMA   Continue Plavix.  CCS class 0 symptoms.  Mildly submaximal treadmill 2024 was unremarkable  2. HTN:   Blood pressure is well controlled at home  3. HLD:  Atorva 80, at goal  4. Hypothyroidism  5.  Obesity: Discussed in detail.  Goal to get close to 220 pounds  6.  Palpitations associated with presyncope: Likely related to volume depletion/heat exposure.  Plan 2-week monitor    Clarissa, thank you referring this kind patient to me.  Please call with any questions or concerns.  I will see him again in 6 months.       Анна Booker MD  06/03/25    Outpatient Encounter Medications as of 6/3/2025   Medication Sig Dispense Refill    atorvastatin (LIPITOR) 80 MG tablet TAKE 1 TABLET BY MOUTH ONCE DAILY AT NIGHT 90 tablet 0    Cholecalciferol  (VITAMIN D PO) Take 10,000 Units by mouth Every Night.      clopidogrel (PLAVIX) 75 MG tablet Take 1 tablet by mouth once daily 90 tablet 3    Cyanocobalamin (VITAMIN B 12 PO) Take 1,000 mcg by mouth Every Night. HOLD PER MD INSTR      levothyroxine (SYNTHROID, LEVOTHROID) 150 MCG tablet Take 1 tablet by mouth once daily 90 tablet 0    losartan (COZAAR) 50 MG tablet TAKE 1 TABLET BY MOUTH ONCE DAILY AT NIGHT 90 tablet 0    metoprolol succinate XL (TOPROL-XL) 25 MG 24 hr tablet TAKE 1/2 (ONE-HALF) TABLET BY MOUTH ONCE DAILY AT NIGHT 45 tablet 1    tadalafil (Cialis) 20 MG tablet Take 1 tablet by mouth Daily As Needed for Erectile Dysfunction. (Patient taking differently: Take 1 tablet by mouth Daily As Needed for Erectile Dysfunction. HOLD PER MD INSTR-HOLD 72 HOURS) 30 tablet 0    vitamin C (ASCORBIC ACID) 250 MG tablet Take 1 tablet by mouth Every Night. HOLD PER MD INSTR      zinc sulfate (ZINCATE) 220 (50 Zn) MG capsule Take 1 capsule by mouth Daily. HOLD PER MD INSTR      anastrozole (ARIMIDEX) 1 MG tablet Take  by mouth Daily.      HYDROcodone-acetaminophen (NORCO) 5-325 MG per tablet Take 1 tablet by mouth Every 4 (Four) Hours As Needed for Severe Pain. (Patient not taking: Reported on 3/20/2025) 12 tablet 0    traMADol (ULTRAM) 50 MG tablet Take 1 tablet by mouth Every 6 (Six) Hours As Needed for Moderate Pain. (Patient not taking: Reported on 3/20/2025) 20 tablet 0     No facility-administered encounter medications on file as of 6/3/2025.

## 2025-06-04 RX ORDER — ATORVASTATIN CALCIUM 80 MG/1
80 TABLET, FILM COATED ORAL NIGHTLY
Qty: 90 TABLET | Refills: 3 | Status: SHIPPED | OUTPATIENT
Start: 2025-06-04

## 2025-06-11 RX ORDER — LOSARTAN POTASSIUM 50 MG/1
50 TABLET ORAL NIGHTLY
Qty: 90 TABLET | Refills: 0 | Status: SHIPPED | OUTPATIENT
Start: 2025-06-11

## 2025-07-11 NOTE — PROGRESS NOTES
Patient: SIMON Gomez Jr.  YOB: 1960  Date of Service: 7/11/2025    Chief Complaints: Right knee pain    Subjective:    History of Present Illness: Pt is seen in the office today with complaints of right knee pain he is status post knee arthroscopy he was found to have some degenerative changes at time of surgery it is bothering him a little show we talked about options going forward we did inject him at the time of surgery we can inject him again for 3 months its actually been over 3 months he states if he stands all day when he is working at the [employer] then his knee bothers him its primarily medial he does have some swelling        Allergies:   Allergies   Allergen Reactions    Meperidine Anaphylaxis     Per patient's wife, he stopped breathing    Hydrocodone-Acetaminophen Hallucinations    Oxycodone-Acetaminophen Hallucinations       Medications:   Home Medications:  Current Outpatient Medications on File Prior to Visit   Medication Sig    atorvastatin (LIPITOR) 80 MG tablet TAKE 1 TABLET BY MOUTH ONCE DAILY AT NIGHT    Cholecalciferol (VITAMIN D PO) Take 10,000 Units by mouth Every Night.    clopidogrel (PLAVIX) 75 MG tablet Take 1 tablet by mouth once daily    Cyanocobalamin (VITAMIN B 12 PO) Take 1,000 mcg by mouth Every Night. HOLD PER MD INSTR    levothyroxine (SYNTHROID, LEVOTHROID) 150 MCG tablet Take 1 tablet by mouth once daily    losartan (COZAAR) 50 MG tablet TAKE 1 TABLET BY MOUTH ONCE DAILY AT NIGHT    metoprolol succinate XL (TOPROL-XL) 25 MG 24 hr tablet TAKE 1/2 (ONE-HALF) TABLET BY MOUTH ONCE DAILY AT NIGHT    tadalafil (Cialis) 20 MG tablet Take 1 tablet by mouth Daily As Needed for Erectile Dysfunction. (Patient taking differently: Take 1 tablet by mouth Daily As Needed for Erectile Dysfunction. HOLD PER MD INSTR-HOLD 72 HOURS)    vitamin C (ASCORBIC ACID) 250 MG tablet Take 1 tablet by mouth Every Night. HOLD PER MD INSTR    zinc sulfate (ZINCATE) 220 (50 Zn) MG capsule  "Take 1 capsule by mouth Daily. HOLD PER MD RUSSO     No current facility-administered medications on file prior to visit.     Current Medications:  Scheduled Meds:  Continuous Infusions:No current facility-administered medications for this visit.    PRN Meds:.    I have reviewed the patient's medical history in detail and updated the computerized patient record.  Review and summarization of old records include:    Past Medical History:   Diagnosis Date    Abnormal heart rhythm     HISTORY OF OF (PT STATES EP STUDY BY DR BENNETT YEARS AGO), FOLLOWED BY DR GALEANO CURRENTLY    Anesthesia complication     PT STATES BECAME \"HYPOXIC UNDER GAS\" YEARS AGO     Arthritis     Atrial fibrillation     Cancer     SQUAMOUS CELL CARCINOMA ON BACK 1990.    Colorblind     Coronary artery disease involving native coronary artery of native heart with angina pectoris     Ex-smoker     History of COVID-19 01/2021    BILAT PNEUMONIA     History of GI bleed     Hyperlipidemia LDL goal <70 01/02/2019    Hypertension     Hypogonadism in male 10/21/2016    Hypothyroidism     Pneumonia due to COVID-19 virus     ST elevation myocardial infarction (STEMI) 12/26/2018    Tear of meniscus of knee     Testosterone deficiency         Past Surgical History:   Procedure Laterality Date    ADENOIDECTOMY      ANGIOPLASTY      STENT PLACEMENT X 5 (2018,2019)     BACK SURGERY      L5-S1    CARDIAC CATHETERIZATION N/A 12/26/2018    Procedure: Left Heart Cath;  Surgeon: Анна Galeano MD;  Location:  [facility] CATH INVASIVE LOC[facility];  Service: Cardiovascular    CARDIAC CATHETERIZATION N/A 12/26/2018    Procedure: Coronary angiography;  Surgeon: Анна Galeano MD;  [facility];  Service: Cardiovascular    CARDIAC CATHETERIZATION N/A 12/26/2018    Procedure: Stent REMA coronary;  Surgeon: Анна Galeano MD;  Location:  [facility] CATH INVASIVE LOCATION;  Service: Cardiovascular    CARDIAC CATHETERIZATION  12/26/2018    Procedure: Percutaneous " Mechanical Thrombectomy;  Surgeon: Анна Booker MD;  [facility];  Service: Cardiovascular    CARDIAC CATHETERIZATION N/A 02/13/2019    Procedure: Coronary angiography;  Surgeon: Анна Booker MD;  [facility];  Service: Cardiology    CARDIAC CATHETERIZATION N/A 02/13/2019    Procedure: Stent REMA coronary;  Surgeon: Анна Booker MD;  [facility];  Service: Cardiology    CARDIAC CATHETERIZATION N/A 02/13/2019    Procedure: Left Heart Cath;  Surgeon: Анна Booker MD;  [facility];  Service: Cardiology    CARDIAC CATHETERIZATION N/A 02/13/2019    Procedure: Left ventriculography;  Surgeon: Анна Booker MD;  Location: [facility] CATH INVASIVE LOCATION;  Service: Cardiology    CARDIAC CATHETERIZATION  2/13/2019    CARDIAC ELECTROPHYSIOLOGY STUDY AND ABLATION      CATARACT EXTRACTION Right 01/04/2023    CATARACT EXTRACTION Left 01/11/2023    CATARACT EXTRACTION, BILATERAL      COLONOSCOPY      COLONOSCOPY N/A 06/26/2017    Procedure: COLONOSCOPY AT BEDSIDE;  Surgeon: Fadi Gallagher MD;  Location: [facility] ENDOSCOPY;  Service:     EXTERNAL EAR SURGERY      HAND SURGERY Left     INGUINAL HERNIA REPAIR      X3    KNEE ARTHROSCOPY Left 01/19/2022    Procedure: KNEE ARTHROSCOPY, PARTIAL MEDIAL MENISECTOMY, DEBRIDEMENT OF ARTHRITIS, REMOVAL OF LOSE BODIES;  Surgeon: Destiney Tucker MD;  Location: [facility] OR [facility];  Service: Orthopedics;  Laterality: Left;    KNEE ARTHROSCOPY Right 3/7/2025    Procedure: Right Knee Arthroscopy with partial medial and partial lateral meniscectomy with debridement of arthritis;  Surgeon: Destiney Tucker MD;  Location: [facility] OR [facility];  Service: Orthopedics;  Laterality: Right;    KNEE SURGERY      MANDIBLE SURGERY      WIRED X 2  - HIGH SCHOOL    NOSE SURGERY      MO RT/LT HEART CATHETERS N/A 12/26/2018    Procedure: Percutaneous Coronary Intervention;  Surgeon: Анна Booker MD;   Location: [facility] INVASIVE LOCATION;  Service: Cardiovascular    ROTATOR CUFF REPAIR Right     SHOULDER ARTHROSCOPY Right     SHOULDER ARTHROSCOPY Left     SHOULDER SURGERY      THYROIDECTOMY      TONSILLECTOMY          Social History     Occupational History    Not on file   Tobacco Use    Smoking status: Former     Types: Cigars, Cigarettes    Smokeless tobacco: Never    Tobacco comments:     SOCIAL CIGAR SMOKING ONLY QUIT 2009   Vaping Use    Vaping status: Never Used   Substance and Sexual Activity    Alcohol use: Yes     Comment: Rarely    Drug use: No    Sexual activity: Defer     Partners: Female      Social History     Social History Narrative    Works at [employer]            Family History   Problem Relation Age of Onset    Arthritis Mother     No Known Problems Father     No Known Problems Brother     No Known Problems Daughter     No Known Problems Brother     Malstephy Hyperthermia Neg Hx        ROS: 14 point review of systems was performed and was negative except for documented findings in HPI and today's encounter.     Allergies:   Allergies   Allergen Reactions    Meperidine Anaphylaxis     Per patient's wife, he stopped breathing    Hydrocodone-Acetaminophen Hallucinations    Oxycodone-Acetaminophen Hallucinations     Constitutional:  Denies fever, shaking or chills   Eyes:  Denies change in visual acuity   HENT:  Denies nasal congestion or sore throat   Respiratory:  Denies cough or shortness of breath   Cardiovascular:  Denies chest pain or severe LE edema   GI:  Denies abdominal pain, nausea, vomiting, bloody stools or diarrhea   Musculoskeletal:  Numbness, tingling, or loss of motor function only as noted above in history of present illness.  : Denies painful urination or hematuria  Integument:  Denies rash, lesion or ulceration   Neurologic:  Denies headache or focal weakness  Endocrine:  Denies lymphadenopathy  Psych:  Denies confusion or change in mental  status   Hem:  Denies active bleeding      Physical Exam: 65 y.o. male  Wt Readings from Last 3 Encounters:   06/03/25 115 kg (254 lb)   03/20/25 115 kg (254 lb 9.6 oz)   02/28/25 116 kg (255 lb 14.4 oz)       There is no height or weight on file to calculate BMI.    There were no vitals filed for this visit.  Vital signs reviewed.   General Appearance:    Alert, cooperative, in no acute distress                    Ortho exam      Physical exam of the right knee reveals no effusion, no erythema.  It mild loss of extension and full flexion  Patient has mild varus alignment.  They have mild tenderness to palpation about the medial compartment, no tenderness laterally..  The patient has a negative bounce home, negative Marquise and a stable ligamentous exam.  Quad tone is reasonable and symmetric.  There are no overlying skin changes no lymphedema no lymphadenopathy.  There is good hip range of motion which is full symmetric and asymptomatic and a normal ankle exam.            Assessment: Status post knee arthroscopy we did review his intraoperative pictures he did have a meniscus tear but also had significant degenerative changes    Plan: Status post right knee arthroscopy with degenerative changes I think he standing all day is bothering him again this is probably acute on chronic plan is to proceed with injections he knows to continue to work on quad and core strengthening he knows his options going forward  Follow up as indicated.  Ice, elevate, and rest as needed.  Discussed conservative measures of pain control including ice, bracing.  Also talked about the importance of strengthening and maintaining ideal body weight  Large Joint Arthrocentesis: R knee  Date/Time: 7/15/2025 4:32 PM  Consent given by: patient  Site marked: site marked  Timeout: Immediately prior to procedure a time out was called to verify the correct patient, procedure, equipment, support staff and site/side marked as required   Supporting  Documentation  Indications: pain   Procedure Details  Location: knee - R knee  Preparation: Patient was prepped and draped in the usual sterile fashion  Needle gauge: 21G.  Approach: anteromedial  Medications administered: 80 mg methylPREDNISolone acetate 80 MG/ML; 2 mL lidocaine PF 1% 1 %  Patient tolerance: patient tolerated the procedure well with no immediate complications      Destiney Tucker M.D.

## 2025-07-15 ENCOUNTER — OFFICE VISIT (OUTPATIENT)
Dept: ORTHOPEDIC SURGERY | Facility: CLINIC | Age: 65
End: 2025-07-15
Payer: OTHER MISCELLANEOUS

## 2025-07-15 VITALS — BODY MASS INDEX: 33.93 KG/M2 | TEMPERATURE: 98.6 F | HEIGHT: 73 IN | WEIGHT: 256 LBS

## 2025-07-15 DIAGNOSIS — M17.11 PRIMARY OSTEOARTHRITIS OF RIGHT KNEE: Primary | ICD-10-CM

## 2025-07-15 DIAGNOSIS — Z98.890 STATUS POST ARTHROSCOPY OF KNEE: ICD-10-CM

## 2025-07-15 PROCEDURE — 20610 DRAIN/INJ JOINT/BURSA W/O US: CPT | Performed by: ORTHOPAEDIC SURGERY

## 2025-07-15 PROCEDURE — 99212 OFFICE O/P EST SF 10 MIN: CPT | Performed by: ORTHOPAEDIC SURGERY

## 2025-07-15 RX ORDER — LIDOCAINE HYDROCHLORIDE 10 MG/ML
2 INJECTION, SOLUTION EPIDURAL; INFILTRATION; INTRACAUDAL; PERINEURAL
Status: COMPLETED | OUTPATIENT
Start: 2025-07-15 | End: 2025-07-15

## 2025-07-15 RX ORDER — METHYLPREDNISOLONE ACETATE 80 MG/ML
80 INJECTION, SUSPENSION INTRA-ARTICULAR; INTRALESIONAL; INTRAMUSCULAR; SOFT TISSUE
Status: COMPLETED | OUTPATIENT
Start: 2025-07-15 | End: 2025-07-15

## 2025-07-15 RX ADMIN — LIDOCAINE HYDROCHLORIDE 2 ML: 10 INJECTION, SOLUTION EPIDURAL; INFILTRATION; INTRACAUDAL; PERINEURAL at 16:32

## 2025-07-15 RX ADMIN — METHYLPREDNISOLONE ACETATE 80 MG: 80 INJECTION, SUSPENSION INTRA-ARTICULAR; INTRALESIONAL; INTRAMUSCULAR; SOFT TISSUE at 16:32

## 2025-08-22 RX ORDER — LEVOTHYROXINE SODIUM 150 UG/1
150 TABLET ORAL DAILY
Qty: 90 TABLET | Refills: 0 | Status: SHIPPED | OUTPATIENT
Start: 2025-08-22

## 2025-08-27 RX ORDER — CLOPIDOGREL BISULFATE 75 MG/1
75 TABLET ORAL DAILY
Qty: 90 TABLET | Refills: 1 | Status: SHIPPED | OUTPATIENT
Start: 2025-08-27

## (undated) DEVICE — GLIDESHEATH BASIC HYDROPHILIC COATED INTRODUCER SHEATH: Brand: GLIDESHEATH

## (undated) DEVICE — DRSNG WND GZ CURAD OIL EMULSION 3X3IN STRL

## (undated) DEVICE — TREK CORONARY DILATATION CATHETER 3.0 MM X 15 MM / RAPID-EXCHANGE: Brand: TREK

## (undated) DEVICE — NC TREK CORONARY DILATATION CATHETER 4.0 MM X 12 MM / RAPID-EXCHANGE: Brand: NC TREK

## (undated) DEVICE — CATH DIAG IMPULSE FR4 5F 100CM

## (undated) DEVICE — HI-TORQUE WIGGLE GUIDE WIRE .014 STRAIGHT TIP 2.0 CM X 190 CM: Brand: HI-TORQUE WIGGLE

## (undated) DEVICE — SKIN PREP TRAY W/CHG: Brand: MEDLINE INDUSTRIES, INC.

## (undated) DEVICE — ABL APOLLO RF M/PRT 50D

## (undated) DEVICE — SUT ETHLN 3/0 PS1 18IN 1663H

## (undated) DEVICE — RUNTHROUGH NS EXTRA FLOPPY PTCA GUIDEWIRE: Brand: RUNTHROUGH

## (undated) DEVICE — Device: Brand: DEFENDO AIR/WATER/SUCTION AND BIOPSY VALVE

## (undated) DEVICE — BLD DISSCT COOL CUT SJ CRVD 4MM 13CM

## (undated) DEVICE — GW EMR FIX EXCHG J STD .035 3MM 260CM

## (undated) DEVICE — NC TREK CORONARY DILATATION CATHETER 3.0 MM X 15 MM / RAPID-EXCHANGE: Brand: NC TREK

## (undated) DEVICE — PK CATH CARD 40

## (undated) DEVICE — PK KN ARTHROSCOPY 50

## (undated) DEVICE — HI-TORQUE WHISPER MS GUIDE WIRE .014 STRAIGHT TIP 3.0 CM X 190 CM: Brand: HI-TORQUE WHISPER

## (undated) DEVICE — DEV INDEFLATOR

## (undated) DEVICE — UNDERCAST PADDING: Brand: DEROYAL

## (undated) DEVICE — TBG ARTHSCP PT W CONN/REDUC 8FT

## (undated) DEVICE — HI-TORQUE BALANCE MIDDLEWEIGHT GUIDE WIRE .014 STRAIGHT TIP 3.0 CM X 190 CM: Brand: HI-TORQUE BALANCE MIDDLEWEIGHT

## (undated) DEVICE — PROB ABL APOLLORF MP50 ASP 50DEG

## (undated) DEVICE — GLV SURG BIOGEL LTX PF 6 1/2

## (undated) DEVICE — TR BAND RADIAL ARTERY COMPRESSION DEVICE: Brand: TR BAND

## (undated) DEVICE — KT MANIFLD CARDIAC

## (undated) DEVICE — BNDG ELAS ELITE V/CLOSE 4IN 5YD LF STRL

## (undated) DEVICE — TREK CORONARY DILATATION CATHETER 2.50 MM X 12 MM / RAPID-EXCHANGE: Brand: TREK

## (undated) DEVICE — NC TREK CORONARY DILATATION CATHETER 3.5 MM X 12 MM / RAPID-EXCHANGE: Brand: NC TREK

## (undated) DEVICE — 6F .070 XB 3.5 100CM: Brand: VISTA BRITE TIP

## (undated) DEVICE — 6F .070 XB LAD 4 100CM: Brand: VISTA BRITE TIP

## (undated) DEVICE — GUIDELINER CATHETERS ARE INTENDED TO BE USED IN CONJUNCTION WITH GUIDE CATHETERS TO ACCESS DISCRETE REGIONS OF THE CORONARY AND/OR PERIPHERAL VASCULATURE, AND TO FACILITATE PLACEMENT OF INTERVENTIONAL DEVICES.: Brand: GUIDELINER® V3 CATHETER

## (undated) DEVICE — CATH VENT MIV RADL PIG ST TIP 5F 110CM

## (undated) DEVICE — 6F .070 XB LAD 3.5 100CM: Brand: VISTA BRITE TIP

## (undated) DEVICE — CANN NASL CO2 TRULINK W/O2 A/

## (undated) DEVICE — PK ARTHSCP 40

## (undated) DEVICE — BNDG,ELSTC,MATRIX,STRL,4"X5YD,LF,HOOK&LP: Brand: MEDLINE

## (undated) DEVICE — THE TORRENT IRRIGATION SCOPE CONNECTOR IS USED WITH THE TORRENT IRRIGATION TUBING TO PROVIDE IRRIGATION FLUIDS SUCH AS STERILE WATER DURING GASTROINTESTINAL ENDOSCOPIC PROCEDURES WHEN USED IN CONJUNCTION WITH AN IRRIGATION PUMP (OR ELECTROSURGICAL UNIT).: Brand: TORRENT

## (undated) DEVICE — TUBING, SUCTION, 1/4" X 10', STRAIGHT: Brand: MEDLINE

## (undated) DEVICE — CATH DIAG IMPULSE FL3.5 5F 100CM

## (undated) DEVICE — CATH ASPIR EXPORTADVANCE 6F .014IN 140CM

## (undated) DEVICE — DISPOSABLE TOURNIQUET CUFF SINGLE BLADDER, SINGLE PORT AND QUICK CONNECT CONNECTOR: Brand: COLOR CUFF